# Patient Record
Sex: MALE | Race: WHITE | Employment: OTHER | URBAN - METROPOLITAN AREA
[De-identification: names, ages, dates, MRNs, and addresses within clinical notes are randomized per-mention and may not be internally consistent; named-entity substitution may affect disease eponyms.]

---

## 2017-01-14 PROBLEM — N39.0 UTI (URINARY TRACT INFECTION): Status: ACTIVE | Noted: 2017-01-14

## 2017-01-14 PROBLEM — N40.0 PROSTATISM: Status: ACTIVE | Noted: 2017-01-14

## 2017-01-14 PROBLEM — N32.0 BLADDER NECK OBSTRUCTION: Status: ACTIVE | Noted: 2017-01-14

## 2017-01-14 PROBLEM — C61 PROSTATE CANCER (HCC): Status: ACTIVE | Noted: 2017-01-14

## 2017-01-14 PROBLEM — R31.9 HEMATURIA: Status: ACTIVE | Noted: 2017-01-14

## 2017-01-17 PROBLEM — I95.1 ORTHOSTATIC HYPOTENSION: Status: ACTIVE | Noted: 2017-01-17

## 2017-01-18 ENCOUNTER — TELEPHONE (OUTPATIENT)
Dept: FAMILY MEDICINE CLINIC | Age: 82
End: 2017-01-18

## 2017-01-23 ENCOUNTER — OFFICE VISIT (OUTPATIENT)
Dept: FAMILY MEDICINE CLINIC | Age: 82
End: 2017-01-23

## 2017-01-23 VITALS
DIASTOLIC BLOOD PRESSURE: 60 MMHG | TEMPERATURE: 97.7 F | RESPIRATION RATE: 12 BRPM | HEIGHT: 70 IN | SYSTOLIC BLOOD PRESSURE: 130 MMHG | WEIGHT: 166.4 LBS | BODY MASS INDEX: 23.82 KG/M2 | HEART RATE: 94 BPM

## 2017-01-23 DIAGNOSIS — N32.0 BLADDER OUTFLOW OBSTRUCTION: ICD-10-CM

## 2017-01-23 DIAGNOSIS — E03.2 HYPOTHYROIDISM DUE TO MEDICATION: ICD-10-CM

## 2017-01-23 DIAGNOSIS — H61.21 HEARING LOSS OF RIGHT EAR DUE TO CERUMEN IMPACTION: ICD-10-CM

## 2017-01-23 DIAGNOSIS — N13.30 BILATERAL HYDRONEPHROSIS: ICD-10-CM

## 2017-01-23 DIAGNOSIS — Z23 FLU VACCINE NEED: ICD-10-CM

## 2017-01-23 DIAGNOSIS — Z09 HOSPITAL DISCHARGE FOLLOW-UP: Primary | ICD-10-CM

## 2017-01-23 DIAGNOSIS — I10 ESSENTIAL HYPERTENSION: ICD-10-CM

## 2017-01-23 DIAGNOSIS — K40.90 INGUINAL HERNIA OF RIGHT SIDE WITHOUT OBSTRUCTION OR GANGRENE: ICD-10-CM

## 2017-01-23 PROCEDURE — G8427 DOCREV CUR MEDS BY ELIG CLIN: HCPCS | Performed by: NURSE PRACTITIONER

## 2017-01-23 PROCEDURE — 69209 REMOVE IMPACTED EAR WAX UNI: CPT | Performed by: NURSE PRACTITIONER

## 2017-01-23 PROCEDURE — G8484 FLU IMMUNIZE NO ADMIN: HCPCS | Performed by: NURSE PRACTITIONER

## 2017-01-23 PROCEDURE — 1036F TOBACCO NON-USER: CPT | Performed by: NURSE PRACTITIONER

## 2017-01-23 PROCEDURE — 99214 OFFICE O/P EST MOD 30 MIN: CPT | Performed by: NURSE PRACTITIONER

## 2017-01-23 PROCEDURE — G8420 CALC BMI NORM PARAMETERS: HCPCS | Performed by: NURSE PRACTITIONER

## 2017-01-23 PROCEDURE — 1123F ACP DISCUSS/DSCN MKR DOCD: CPT | Performed by: NURSE PRACTITIONER

## 2017-01-23 PROCEDURE — 4040F PNEUMOC VAC/ADMIN/RCVD: CPT | Performed by: NURSE PRACTITIONER

## 2017-01-23 PROCEDURE — 1111F DSCHRG MED/CURRENT MED MERGE: CPT | Performed by: NURSE PRACTITIONER

## 2017-01-23 ASSESSMENT — ENCOUNTER SYMPTOMS
NAUSEA: 0
VOMITING: 0
DIARRHEA: 0
ABDOMINAL DISTENTION: 0
RESPIRATORY NEGATIVE: 1
SORE THROAT: 0
CONSTIPATION: 0
RHINORRHEA: 0
ABDOMINAL PAIN: 0

## 2017-01-30 ENCOUNTER — PROCEDURE VISIT (OUTPATIENT)
Dept: UROLOGY | Age: 82
End: 2017-01-30

## 2017-01-30 VITALS — BODY MASS INDEX: 23.24 KG/M2 | HEIGHT: 71 IN | WEIGHT: 166 LBS

## 2017-01-30 DIAGNOSIS — R35.0 URINARY FREQUENCY: Primary | ICD-10-CM

## 2017-01-30 LAB — POST VOID RESIDUAL (PVR): 220 ML

## 2017-01-30 PROCEDURE — 99999 PR OFFICE/OUTPT VISIT,PROCEDURE ONLY: CPT | Performed by: UROLOGY

## 2017-01-30 PROCEDURE — 1036F TOBACCO NON-USER: CPT | Performed by: UROLOGY

## 2017-01-30 PROCEDURE — 52000 CYSTOURETHROSCOPY: CPT | Performed by: UROLOGY

## 2017-01-30 PROCEDURE — 51798 US URINE CAPACITY MEASURE: CPT | Performed by: UROLOGY

## 2017-01-31 ENCOUNTER — NURSE ONLY (OUTPATIENT)
Dept: FAMILY MEDICINE CLINIC | Age: 82
End: 2017-01-31

## 2017-01-31 DIAGNOSIS — I10 ESSENTIAL HYPERTENSION: ICD-10-CM

## 2017-01-31 DIAGNOSIS — R35.0 URINARY FREQUENCY: ICD-10-CM

## 2017-01-31 DIAGNOSIS — Z01.89 ROUTINE LAB DRAW: Primary | ICD-10-CM

## 2017-01-31 DIAGNOSIS — E03.2 HYPOTHYROIDISM DUE TO MEDICATION: ICD-10-CM

## 2017-01-31 LAB
CHOLESTEROL, TOTAL: 230 MG/DL (ref 100–199)
HDLC SERPL-MCNC: 58 MG/DL
LDL CHOLESTEROL CALCULATED: 142 MG/DL
PROSTATE SPECIFIC ANTIGEN: 4.73 NG/ML (ref 0–2.5)
T4 FREE: 1.11 NG/DL (ref 0.93–1.76)
TRIGL SERPL-MCNC: 152 MG/DL (ref 0–199)
TSH SERPL DL<=0.05 MIU/L-ACNC: 2.55 MCIU/ML (ref 0.4–4.2)

## 2017-01-31 PROCEDURE — 36415 COLL VENOUS BLD VENIPUNCTURE: CPT | Performed by: NURSE PRACTITIONER

## 2017-02-01 ENCOUNTER — OFFICE VISIT (OUTPATIENT)
Age: 82
End: 2017-02-01

## 2017-02-01 ENCOUNTER — TELEPHONE (OUTPATIENT)
Dept: FAMILY MEDICINE CLINIC | Age: 82
End: 2017-02-01

## 2017-02-01 VITALS
TEMPERATURE: 97.9 F | BODY MASS INDEX: 23.52 KG/M2 | HEART RATE: 92 BPM | HEIGHT: 71 IN | SYSTOLIC BLOOD PRESSURE: 124 MMHG | WEIGHT: 168 LBS | DIASTOLIC BLOOD PRESSURE: 68 MMHG | RESPIRATION RATE: 14 BRPM

## 2017-02-01 DIAGNOSIS — K40.90 RIGHT INGUINAL HERNIA: Primary | ICD-10-CM

## 2017-02-01 DIAGNOSIS — I10 ESSENTIAL HYPERTENSION: ICD-10-CM

## 2017-02-01 PROCEDURE — 1036F TOBACCO NON-USER: CPT | Performed by: SURGERY

## 2017-02-01 PROCEDURE — G8484 FLU IMMUNIZE NO ADMIN: HCPCS | Performed by: SURGERY

## 2017-02-01 PROCEDURE — 99204 OFFICE O/P NEW MOD 45 MIN: CPT | Performed by: SURGERY

## 2017-02-01 PROCEDURE — G8427 DOCREV CUR MEDS BY ELIG CLIN: HCPCS | Performed by: SURGERY

## 2017-02-01 PROCEDURE — G8420 CALC BMI NORM PARAMETERS: HCPCS | Performed by: SURGERY

## 2017-02-01 PROCEDURE — 4040F PNEUMOC VAC/ADMIN/RCVD: CPT | Performed by: SURGERY

## 2017-02-01 ASSESSMENT — ENCOUNTER SYMPTOMS
SHORTNESS OF BREATH: 0
NAUSEA: 0
TROUBLE SWALLOWING: 0
COUGH: 0
SINUS PRESSURE: 0
EYE REDNESS: 0
ABDOMINAL DISTENTION: 0
EYE ITCHING: 0
COLOR CHANGE: 0
BACK PAIN: 0
BLOOD IN STOOL: 0
STRIDOR: 0
ANAL BLEEDING: 0
SORE THROAT: 0
RHINORRHEA: 0
EYE DISCHARGE: 0
EYE PAIN: 0
PHOTOPHOBIA: 0
RECTAL PAIN: 0
ABDOMINAL PAIN: 0
VOMITING: 0
CHOKING: 0
CHEST TIGHTNESS: 0
WHEEZING: 0
CONSTIPATION: 0
DIARRHEA: 0
FACIAL SWELLING: 0
APNEA: 0
VOICE CHANGE: 0

## 2017-02-03 ENCOUNTER — TELEPHONE (OUTPATIENT)
Dept: UROLOGY | Age: 82
End: 2017-02-03

## 2017-02-13 ENCOUNTER — NURSE ONLY (OUTPATIENT)
Dept: UROLOGY | Age: 82
End: 2017-02-13

## 2017-02-13 DIAGNOSIS — R35.0 URINARY FREQUENCY: Primary | ICD-10-CM

## 2017-02-13 LAB
BILIRUBIN, POC: NEGATIVE
BLOOD URINE, POC: NORMAL
CLARITY, POC: CLEAR
COLOR, POC: YELLOW
GLUCOSE URINE, POC: NEGATIVE
KETONES, POC: NEGATIVE
LEUKOCYTE EST, POC: NORMAL
NITRITE, POC: NEGATIVE
PH, POC: 5.5
PROTEIN, POC: NORMAL
SPECIFIC GRAVITY, POC: 1.02
UROBILINOGEN, POC: NORMAL

## 2017-02-13 PROCEDURE — 99213 OFFICE O/P EST LOW 20 MIN: CPT | Performed by: UROLOGY

## 2017-02-13 PROCEDURE — 81003 URINALYSIS AUTO W/O SCOPE: CPT | Performed by: UROLOGY

## 2017-02-20 ENCOUNTER — TELEPHONE (OUTPATIENT)
Dept: UROLOGY | Age: 82
End: 2017-02-20

## 2017-02-20 ENCOUNTER — TELEPHONE (OUTPATIENT)
Dept: FAMILY MEDICINE CLINIC | Age: 82
End: 2017-02-20

## 2017-02-20 ENCOUNTER — TELEPHONE (OUTPATIENT)
Age: 82
End: 2017-02-20

## 2017-02-20 DIAGNOSIS — N32.0 BLADDER OUTFLOW OBSTRUCTION: Primary | ICD-10-CM

## 2017-02-20 DIAGNOSIS — C61 PROSTATE CANCER (HCC): ICD-10-CM

## 2017-02-20 DIAGNOSIS — Z01.818 PRE-OP TESTING: ICD-10-CM

## 2017-03-11 ENCOUNTER — TELEPHONE (OUTPATIENT)
Dept: UROLOGY | Age: 82
End: 2017-03-11

## 2017-03-11 DIAGNOSIS — N30.00 ACUTE CYSTITIS WITHOUT HEMATURIA: Primary | ICD-10-CM

## 2017-03-13 ENCOUNTER — OFFICE VISIT (OUTPATIENT)
Dept: FAMILY MEDICINE CLINIC | Age: 82
End: 2017-03-13

## 2017-03-13 VITALS
WEIGHT: 173.2 LBS | HEIGHT: 71 IN | RESPIRATION RATE: 12 BRPM | BODY MASS INDEX: 24.25 KG/M2 | SYSTOLIC BLOOD PRESSURE: 130 MMHG | TEMPERATURE: 97.7 F | DIASTOLIC BLOOD PRESSURE: 62 MMHG | HEART RATE: 62 BPM

## 2017-03-13 DIAGNOSIS — E03.9 HYPOTHYROIDISM, UNSPECIFIED TYPE: ICD-10-CM

## 2017-03-13 DIAGNOSIS — N40.1 BENIGN PROSTATIC HYPERPLASIA WITH LOWER URINARY TRACT SYMPTOMS, UNSPECIFIED MORPHOLOGY: ICD-10-CM

## 2017-03-13 DIAGNOSIS — N32.0 BLADDER OUTFLOW OBSTRUCTION: ICD-10-CM

## 2017-03-13 DIAGNOSIS — Z01.818 PREOPERATIVE CLEARANCE: Primary | ICD-10-CM

## 2017-03-13 DIAGNOSIS — I10 ESSENTIAL HYPERTENSION: ICD-10-CM

## 2017-03-13 PROCEDURE — G8484 FLU IMMUNIZE NO ADMIN: HCPCS | Performed by: NURSE PRACTITIONER

## 2017-03-13 PROCEDURE — G8427 DOCREV CUR MEDS BY ELIG CLIN: HCPCS | Performed by: NURSE PRACTITIONER

## 2017-03-13 PROCEDURE — 1036F TOBACCO NON-USER: CPT | Performed by: NURSE PRACTITIONER

## 2017-03-13 PROCEDURE — 4040F PNEUMOC VAC/ADMIN/RCVD: CPT | Performed by: NURSE PRACTITIONER

## 2017-03-13 PROCEDURE — 99214 OFFICE O/P EST MOD 30 MIN: CPT | Performed by: NURSE PRACTITIONER

## 2017-03-13 PROCEDURE — G8420 CALC BMI NORM PARAMETERS: HCPCS | Performed by: NURSE PRACTITIONER

## 2017-03-14 ENCOUNTER — NURSE TRIAGE (OUTPATIENT)
Dept: ADMINISTRATIVE | Age: 82
End: 2017-03-14

## 2017-03-14 ENCOUNTER — TELEPHONE (OUTPATIENT)
Dept: UROLOGY | Age: 82
End: 2017-03-14

## 2017-03-14 RX ORDER — DOXYCYCLINE HYCLATE 100 MG
100 TABLET ORAL 2 TIMES DAILY
Qty: 20 TABLET | Refills: 0 | Status: SHIPPED | OUTPATIENT
Start: 2017-03-14 | End: 2017-03-23 | Stop reason: ALTCHOICE

## 2017-03-14 RX ORDER — CEPHALEXIN 250 MG/1
250 CAPSULE ORAL 4 TIMES DAILY
Qty: 40 CAPSULE | Refills: 0 | Status: SHIPPED | OUTPATIENT
Start: 2017-03-14 | End: 2017-03-23 | Stop reason: ALTCHOICE

## 2017-03-14 RX ORDER — TETRACYCLINE HYDROCHLORIDE 250 MG/1
250 CAPSULE ORAL 2 TIMES DAILY
Qty: 20 CAPSULE | Refills: 0 | Status: SHIPPED | OUTPATIENT
Start: 2017-03-14 | End: 2017-03-30

## 2017-03-23 ENCOUNTER — TELEPHONE (OUTPATIENT)
Dept: UROLOGY | Age: 82
End: 2017-03-23

## 2017-03-23 DIAGNOSIS — N39.0 URINARY TRACT INFECTION, SITE UNSPECIFIED: Primary | ICD-10-CM

## 2017-03-26 ENCOUNTER — TELEPHONE (OUTPATIENT)
Dept: UROLOGY | Age: 82
End: 2017-03-26

## 2017-03-26 RX ORDER — NITROFURANTOIN 25; 75 MG/1; MG/1
100 CAPSULE ORAL 2 TIMES DAILY
Qty: 14 CAPSULE | Refills: 0 | Status: SHIPPED | OUTPATIENT
Start: 2017-03-26 | End: 2017-04-02

## 2017-04-06 ENCOUNTER — OFFICE VISIT (OUTPATIENT)
Dept: UROLOGY | Age: 82
End: 2017-04-06

## 2017-04-06 VITALS
WEIGHT: 173 LBS | BODY MASS INDEX: 24.22 KG/M2 | DIASTOLIC BLOOD PRESSURE: 68 MMHG | HEIGHT: 71 IN | SYSTOLIC BLOOD PRESSURE: 130 MMHG

## 2017-04-06 DIAGNOSIS — C61 PROSTATE CANCER (HCC): ICD-10-CM

## 2017-04-06 DIAGNOSIS — R41.0 CONFUSION: Primary | ICD-10-CM

## 2017-04-06 LAB
BILIRUBIN URINE: NEGATIVE
BLOOD URINE, POC: ABNORMAL
CHARACTER, URINE: CLEAR
COLOR, URINE: YELLOW
GLUCOSE URINE: NEGATIVE MG/DL
KETONES, URINE: NEGATIVE
LEUKOCYTE CLUMPS, URINE: ABNORMAL
NITRITE, URINE: POSITIVE
PH, URINE: 5.5
PROTEIN, URINE: >= 300 MG/DL
SPECIFIC GRAVITY, URINE: 1.02 (ref 1–1.03)
UROBILINOGEN, URINE: 0.2 EU/DL

## 2017-04-06 PROCEDURE — 81003 URINALYSIS AUTO W/O SCOPE: CPT | Performed by: NURSE PRACTITIONER

## 2017-04-06 PROCEDURE — 99024 POSTOP FOLLOW-UP VISIT: CPT | Performed by: NURSE PRACTITIONER

## 2017-04-06 RX ORDER — DOXYCYCLINE HYCLATE 100 MG
100 TABLET ORAL 2 TIMES DAILY
Qty: 14 TABLET | Refills: 0 | Status: SHIPPED | OUTPATIENT
Start: 2017-04-06 | End: 2017-04-09

## 2017-04-06 RX ORDER — NITROFURANTOIN 25; 75 MG/1; MG/1
CAPSULE ORAL
COMMUNITY
Start: 2017-03-26 | End: 2017-04-12 | Stop reason: ALTCHOICE

## 2017-04-06 ASSESSMENT — ENCOUNTER SYMPTOMS
ABDOMINAL PAIN: 0
NAUSEA: 0
VOMITING: 0

## 2017-04-07 LAB
ORGANISM: ABNORMAL
URINE CULTURE, ROUTINE: ABNORMAL

## 2017-04-10 ENCOUNTER — TELEPHONE (OUTPATIENT)
Dept: UROLOGY | Age: 82
End: 2017-04-10

## 2017-04-12 ENCOUNTER — NURSE ONLY (OUTPATIENT)
Dept: UROLOGY | Age: 82
End: 2017-04-12

## 2017-04-12 ENCOUNTER — TELEPHONE (OUTPATIENT)
Dept: UROLOGY | Age: 82
End: 2017-04-12

## 2017-04-12 ENCOUNTER — OFFICE VISIT (OUTPATIENT)
Age: 82
End: 2017-04-12

## 2017-04-12 VITALS
TEMPERATURE: 97.8 F | SYSTOLIC BLOOD PRESSURE: 124 MMHG | BODY MASS INDEX: 23.46 KG/M2 | RESPIRATION RATE: 16 BRPM | WEIGHT: 173 LBS | HEART RATE: 80 BPM | DIASTOLIC BLOOD PRESSURE: 64 MMHG

## 2017-04-12 DIAGNOSIS — N13.8 BPH WITH OBSTRUCTION/LOWER URINARY TRACT SYMPTOMS: Primary | ICD-10-CM

## 2017-04-12 DIAGNOSIS — N40.1 BPH WITH OBSTRUCTION/LOWER URINARY TRACT SYMPTOMS: Primary | ICD-10-CM

## 2017-04-12 DIAGNOSIS — Z87.19 S/P RIGHT INGUINAL HERNIA REPAIR: Primary | ICD-10-CM

## 2017-04-12 DIAGNOSIS — Z98.890 S/P RIGHT INGUINAL HERNIA REPAIR: Primary | ICD-10-CM

## 2017-04-12 PROCEDURE — 99024 POSTOP FOLLOW-UP VISIT: CPT | Performed by: NURSE PRACTITIONER

## 2017-04-12 RX ORDER — NITROFURANTOIN 25; 75 MG/1; MG/1
100 CAPSULE ORAL 2 TIMES DAILY
Qty: 10 CAPSULE | Refills: 0 | Status: SHIPPED | OUTPATIENT
Start: 2017-04-12 | End: 2017-04-17

## 2017-04-12 ASSESSMENT — ENCOUNTER SYMPTOMS
VOICE CHANGE: 0
SORE THROAT: 0
ANAL BLEEDING: 0
VOMITING: 0
EYE ITCHING: 0
NAUSEA: 0
SINUS PRESSURE: 0
SHORTNESS OF BREATH: 0
EYE DISCHARGE: 0
ABDOMINAL DISTENTION: 0
STRIDOR: 0
TROUBLE SWALLOWING: 0
COUGH: 0
BACK PAIN: 0
CHEST TIGHTNESS: 0
FACIAL SWELLING: 0
WHEEZING: 0
DIARRHEA: 0
PHOTOPHOBIA: 0
RHINORRHEA: 0
BLOOD IN STOOL: 0
CONSTIPATION: 0
EYE PAIN: 0
EYE REDNESS: 0
CHOKING: 0
APNEA: 0
RECTAL PAIN: 0
ABDOMINAL PAIN: 0
COLOR CHANGE: 0

## 2017-04-13 ENCOUNTER — NURSE ONLY (OUTPATIENT)
Dept: UROLOGY | Age: 82
End: 2017-04-13

## 2017-04-13 VITALS — WEIGHT: 173 LBS | HEIGHT: 72 IN | BODY MASS INDEX: 23.43 KG/M2

## 2017-04-13 DIAGNOSIS — R33.9 URINARY RETENTION: Primary | ICD-10-CM

## 2017-04-13 LAB
BILIRUBIN URINE: NEGATIVE
BLOOD URINE, POC: ABNORMAL
CHARACTER, URINE: CLEAR
COLOR, URINE: YELLOW
GLUCOSE URINE: NEGATIVE MG/DL
KETONES, URINE: NEGATIVE
LEUKOCYTE CLUMPS, URINE: ABNORMAL
NITRITE, URINE: NEGATIVE
PH, URINE: 6
POST VOID RESIDUAL (PVR): 39 ML
PROTEIN, URINE: 100 MG/DL
SPECIFIC GRAVITY, URINE: 1.02 (ref 1–1.03)
UROBILINOGEN, URINE: 0.2 EU/DL

## 2017-04-13 PROCEDURE — 51701 INSERT BLADDER CATHETER: CPT | Performed by: NURSE PRACTITIONER

## 2017-04-13 PROCEDURE — 51798 US URINE CAPACITY MEASURE: CPT | Performed by: NURSE PRACTITIONER

## 2017-04-13 PROCEDURE — 81003 URINALYSIS AUTO W/O SCOPE: CPT | Performed by: NURSE PRACTITIONER

## 2017-04-13 PROCEDURE — 99024 POSTOP FOLLOW-UP VISIT: CPT | Performed by: NURSE PRACTITIONER

## 2017-04-14 ASSESSMENT — ENCOUNTER SYMPTOMS
ALLERGIC/IMMUNOLOGIC NEGATIVE: 1
ABDOMINAL DISTENTION: 0
ROS SKIN COMMENTS: INCISION HEALING
NAUSEA: 0
COLOR CHANGE: 0
COUGH: 0
CHEST TIGHTNESS: 0
DIARRHEA: 0
RESPIRATORY NEGATIVE: 1
CONSTIPATION: 0
VOMITING: 0
ABDOMINAL PAIN: 0
SHORTNESS OF BREATH: 0
BACK PAIN: 0
EYES NEGATIVE: 1

## 2017-04-17 ENCOUNTER — TELEPHONE (OUTPATIENT)
Dept: UROLOGY | Age: 82
End: 2017-04-17

## 2017-04-19 ENCOUNTER — TELEPHONE (OUTPATIENT)
Dept: UROLOGY | Age: 82
End: 2017-04-19

## 2017-04-19 ENCOUNTER — OFFICE VISIT (OUTPATIENT)
Dept: UROLOGY | Age: 82
End: 2017-04-19

## 2017-04-19 VITALS
WEIGHT: 177 LBS | HEIGHT: 71 IN | SYSTOLIC BLOOD PRESSURE: 114 MMHG | DIASTOLIC BLOOD PRESSURE: 48 MMHG | BODY MASS INDEX: 24.78 KG/M2

## 2017-04-19 DIAGNOSIS — C61 PROSTATE CANCER (HCC): Primary | ICD-10-CM

## 2017-04-19 DIAGNOSIS — R33.9 URINARY RETENTION: Primary | ICD-10-CM

## 2017-04-19 DIAGNOSIS — C61 PROSTATE CANCER (HCC): ICD-10-CM

## 2017-04-19 LAB — POST VOID RESIDUAL (PVR): 40 ML

## 2017-04-19 PROCEDURE — 99024 POSTOP FOLLOW-UP VISIT: CPT | Performed by: NURSE PRACTITIONER

## 2017-04-19 PROCEDURE — 1123F ACP DISCUSS/DSCN MKR DOCD: CPT | Performed by: NURSE PRACTITIONER

## 2017-04-19 PROCEDURE — 4040F PNEUMOC VAC/ADMIN/RCVD: CPT | Performed by: NURSE PRACTITIONER

## 2017-04-19 PROCEDURE — 1036F TOBACCO NON-USER: CPT | Performed by: NURSE PRACTITIONER

## 2017-04-19 PROCEDURE — 1111F DSCHRG MED/CURRENT MED MERGE: CPT | Performed by: NURSE PRACTITIONER

## 2017-04-19 PROCEDURE — G8420 CALC BMI NORM PARAMETERS: HCPCS | Performed by: NURSE PRACTITIONER

## 2017-04-19 PROCEDURE — G8427 DOCREV CUR MEDS BY ELIG CLIN: HCPCS | Performed by: NURSE PRACTITIONER

## 2017-04-19 PROCEDURE — 51798 US URINE CAPACITY MEASURE: CPT | Performed by: NURSE PRACTITIONER

## 2017-04-19 RX ORDER — TAMSULOSIN HYDROCHLORIDE 0.4 MG/1
0.4 CAPSULE ORAL DAILY
Qty: 90 CAPSULE | Refills: 1 | Status: SHIPPED | OUTPATIENT
Start: 2017-04-19 | End: 2017-11-29

## 2017-04-19 ASSESSMENT — ENCOUNTER SYMPTOMS
ABDOMINAL PAIN: 0
NAUSEA: 0
VOMITING: 0

## 2017-06-02 ENCOUNTER — TELEPHONE (OUTPATIENT)
Dept: UROLOGY | Age: 82
End: 2017-06-02

## 2017-06-02 DIAGNOSIS — N39.0 URINARY TRACT INFECTION, SITE UNSPECIFIED: Primary | ICD-10-CM

## 2017-06-05 ENCOUNTER — TELEPHONE (OUTPATIENT)
Dept: UROLOGY | Age: 82
End: 2017-06-05

## 2017-06-05 RX ORDER — CEFDINIR 300 MG/1
300 CAPSULE ORAL 2 TIMES DAILY
Qty: 20 CAPSULE | Refills: 0 | Status: SHIPPED | OUTPATIENT
Start: 2017-06-05 | End: 2017-06-15

## 2017-06-06 ENCOUNTER — TELEPHONE (OUTPATIENT)
Dept: FAMILY MEDICINE CLINIC | Age: 82
End: 2017-06-06

## 2017-07-17 ENCOUNTER — TELEPHONE (OUTPATIENT)
Dept: UROLOGY | Age: 82
End: 2017-07-17

## 2017-07-17 ENCOUNTER — HOSPITAL ENCOUNTER (OUTPATIENT)
Age: 82
Discharge: HOME | End: 2017-07-17
Payer: MEDICARE

## 2017-07-17 DIAGNOSIS — C61 PROSTATE CANCER (HCC): ICD-10-CM

## 2017-07-17 LAB — PROSTATE SPECIFIC ANTIGEN: 0.99 NG/ML (ref 0–1)

## 2017-07-17 PROCEDURE — 84153 ASSAY OF PSA TOTAL: CPT

## 2017-07-17 PROCEDURE — 36415 COLL VENOUS BLD VENIPUNCTURE: CPT

## 2017-07-18 ENCOUNTER — OFFICE VISIT (OUTPATIENT)
Dept: UROLOGY | Age: 82
End: 2017-07-18
Payer: MEDICARE

## 2017-07-18 VITALS
SYSTOLIC BLOOD PRESSURE: 134 MMHG | HEIGHT: 71 IN | DIASTOLIC BLOOD PRESSURE: 78 MMHG | BODY MASS INDEX: 24.78 KG/M2 | WEIGHT: 177 LBS

## 2017-07-18 DIAGNOSIS — C61 PROSTATE CANCER (HCC): ICD-10-CM

## 2017-07-18 DIAGNOSIS — R35.0 URINARY FREQUENCY: ICD-10-CM

## 2017-07-18 DIAGNOSIS — N32.0 BLADDER OUTFLOW OBSTRUCTION: Primary | ICD-10-CM

## 2017-07-18 LAB
BACTERIA: ABNORMAL /HPF
BILIRUBIN URINE: ABNORMAL
BLOOD, URINE: ABNORMAL
CASTS 2: ABNORMAL /LPF
CASTS UA: ABNORMAL /LPF
CHARACTER, URINE: ABNORMAL
COLOR: ABNORMAL
CRYSTALS, UA: ABNORMAL
EPITHELIAL CELLS, UA: ABNORMAL /HPF
GLUCOSE URINE: NEGATIVE MG/DL
ICTOTEST: NEGATIVE
KETONES, URINE: ABNORMAL
LEUKOCYTE ESTERASE, URINE: ABNORMAL
MISCELLANEOUS 2: ABNORMAL
NITRITE, URINE: NEGATIVE
PH UA: 6.5
POST VOID RESIDUAL (PVR): 54 ML
PROTEIN UA: 300
RBC URINE: ABNORMAL /HPF
RENAL EPITHELIAL, UA: ABNORMAL
SPECIFIC GRAVITY, URINE: 1.02 (ref 1–1.03)
UROBILINOGEN, URINE: 0.2 EU/DL
WBC UA: > 200 /HPF
YEAST: ABNORMAL

## 2017-07-18 PROCEDURE — 1123F ACP DISCUSS/DSCN MKR DOCD: CPT | Performed by: NURSE PRACTITIONER

## 2017-07-18 PROCEDURE — 51798 US URINE CAPACITY MEASURE: CPT | Performed by: NURSE PRACTITIONER

## 2017-07-18 PROCEDURE — 1036F TOBACCO NON-USER: CPT | Performed by: NURSE PRACTITIONER

## 2017-07-18 PROCEDURE — G8420 CALC BMI NORM PARAMETERS: HCPCS | Performed by: NURSE PRACTITIONER

## 2017-07-18 PROCEDURE — G8428 CUR MEDS NOT DOCUMENT: HCPCS | Performed by: NURSE PRACTITIONER

## 2017-07-18 PROCEDURE — 4040F PNEUMOC VAC/ADMIN/RCVD: CPT | Performed by: NURSE PRACTITIONER

## 2017-07-18 PROCEDURE — 99213 OFFICE O/P EST LOW 20 MIN: CPT | Performed by: NURSE PRACTITIONER

## 2017-07-18 ASSESSMENT — ENCOUNTER SYMPTOMS
VOMITING: 0
NAUSEA: 0
ABDOMINAL PAIN: 0

## 2017-07-20 ENCOUNTER — TELEPHONE (OUTPATIENT)
Dept: UROLOGY | Age: 82
End: 2017-07-20

## 2017-07-20 LAB
ORGANISM: ABNORMAL
URINE CULTURE REFLEX: ABNORMAL

## 2017-07-20 RX ORDER — CEPHALEXIN 500 MG/1
500 CAPSULE ORAL 4 TIMES DAILY
Qty: 12 CAPSULE | Refills: 0 | Status: SHIPPED | OUTPATIENT
Start: 2017-07-20 | End: 2017-07-23

## 2017-11-13 ENCOUNTER — HOSPITAL ENCOUNTER (OUTPATIENT)
Age: 82
Discharge: HOME | End: 2017-11-13
Payer: MEDICARE

## 2017-11-13 DIAGNOSIS — C61 PROSTATE CANCER (HCC): ICD-10-CM

## 2017-11-13 LAB — PROSTATE SPECIFIC ANTIGEN: 1.14 NG/ML (ref 0–1)

## 2017-11-13 PROCEDURE — 36415 COLL VENOUS BLD VENIPUNCTURE: CPT

## 2017-11-13 PROCEDURE — 84153 ASSAY OF PSA TOTAL: CPT

## 2017-11-14 ENCOUNTER — TELEPHONE (OUTPATIENT)
Dept: UROLOGY | Age: 82
End: 2017-11-14

## 2017-11-14 DIAGNOSIS — C61 PROSTATE CANCER (HCC): Primary | ICD-10-CM

## 2017-11-15 NOTE — TELEPHONE ENCOUNTER
Pt's son stated since having the TURP he has been slightly incontinent, Pt had the TURP 3-2017 and the son feels his father should not still have the incontinence. He is changing his pad 2-3 times a day. Is there something we can do for him? Son wants to bring him in I advised I would send a message to the provider first before we bring him in.

## 2017-11-29 ENCOUNTER — OFFICE VISIT (OUTPATIENT)
Dept: UROLOGY | Age: 82
End: 2017-11-29
Payer: MEDICARE

## 2017-11-29 VITALS
BODY MASS INDEX: 25.06 KG/M2 | HEIGHT: 72 IN | DIASTOLIC BLOOD PRESSURE: 72 MMHG | WEIGHT: 185 LBS | SYSTOLIC BLOOD PRESSURE: 128 MMHG

## 2017-11-29 DIAGNOSIS — R32 URINARY INCONTINENCE, UNSPECIFIED TYPE: ICD-10-CM

## 2017-11-29 DIAGNOSIS — R31.9 URINARY TRACT INFECTION WITH HEMATURIA, SITE UNSPECIFIED: Primary | ICD-10-CM

## 2017-11-29 DIAGNOSIS — N39.0 URINARY TRACT INFECTION WITH HEMATURIA, SITE UNSPECIFIED: Primary | ICD-10-CM

## 2017-11-29 DIAGNOSIS — C61 PROSTATE CANCER (HCC): ICD-10-CM

## 2017-11-29 DIAGNOSIS — Z87.898 HISTORY OF URINARY RETENTION: ICD-10-CM

## 2017-11-29 LAB
BILIRUBIN URINE: NEGATIVE
BLOOD URINE, POC: ABNORMAL
CHARACTER, URINE: CLEAR
COLOR, URINE: YELLOW
GLUCOSE URINE: NEGATIVE MG/DL
KETONES, URINE: ABNORMAL
LEUKOCYTE CLUMPS, URINE: ABNORMAL
NITRITE, URINE: POSITIVE
PH, URINE: 6
POST VOID RESIDUAL (PVR): 0 ML
PROTEIN, URINE: 100 MG/DL
SPECIFIC GRAVITY, URINE: 1.02 (ref 1–1.03)
UROBILINOGEN, URINE: 0.2 EU/DL

## 2017-11-29 PROCEDURE — 1123F ACP DISCUSS/DSCN MKR DOCD: CPT | Performed by: NURSE PRACTITIONER

## 2017-11-29 PROCEDURE — 51798 US URINE CAPACITY MEASURE: CPT | Performed by: NURSE PRACTITIONER

## 2017-11-29 PROCEDURE — G8428 CUR MEDS NOT DOCUMENT: HCPCS | Performed by: NURSE PRACTITIONER

## 2017-11-29 PROCEDURE — 81003 URINALYSIS AUTO W/O SCOPE: CPT | Performed by: NURSE PRACTITIONER

## 2017-11-29 PROCEDURE — 1036F TOBACCO NON-USER: CPT | Performed by: NURSE PRACTITIONER

## 2017-11-29 PROCEDURE — 99214 OFFICE O/P EST MOD 30 MIN: CPT | Performed by: NURSE PRACTITIONER

## 2017-11-29 PROCEDURE — G8484 FLU IMMUNIZE NO ADMIN: HCPCS | Performed by: NURSE PRACTITIONER

## 2017-11-29 PROCEDURE — G8419 CALC BMI OUT NRM PARAM NOF/U: HCPCS | Performed by: NURSE PRACTITIONER

## 2017-11-29 PROCEDURE — 4040F PNEUMOC VAC/ADMIN/RCVD: CPT | Performed by: NURSE PRACTITIONER

## 2017-11-29 RX ORDER — DOXYCYCLINE HYCLATE 100 MG
100 TABLET ORAL 2 TIMES DAILY
Qty: 28 TABLET | Refills: 0 | Status: SHIPPED | OUTPATIENT
Start: 2017-11-29 | End: 2017-12-13

## 2017-11-29 NOTE — PROGRESS NOTES
[facility] PROFESSIONAL SERVS  [facility]  [address]  [address]  [place] [address]  Dept: [phone]  Loc: [phone]  Visit Date: 11/29/2017      HPI:     Nisa Caceres follows up today for prostate cancer, urinary retention and urinary incontinence. He underwent TURP using bipolar loop per Dr. Tami Kumar following right inguinal hernia repair per Dr. Nuris Reese. Pathology showed [place] 5+4=9 prostate cancer with tumor volume of 62%. He has a history of prostate cancer and was treated with external beam radiation therapy in 2000. PSA on 1/15/17 was 2.49 and then on 1/31/17 it was 4.73. A bone scan was negative for metastasis. Patient has chosen active surveillance with intermittent androgen deprivation therapy for treatment. Most recent PSA on 11/13/17 was 1.14  He currently complains of urinary incontinence since his TURP and has become quite bothersome for him. He is interested in options regarding his incontinence. Leaking can occur at anytime. He has not been straight cathing, says he was not getting anything out and has not been doing it for several weeks. PVR today is 0. Urinalysis is concerning for infection today. He comes in with his son. History is obtained from the patient and the medical record. Current Outpatient Prescriptions   Medication Sig Dispense Refill    vitamin D (CHOLECALCIFEROL) 1000 UNIT TABS tablet Take 1,000 Units by mouth daily      doxycycline hyclate (VIBRA-TABS) 100 MG tablet Take 1 tablet by mouth 2 times daily for 14 days 28 tablet 0    levothyroxine (SYNTHROID) 100 MCG tablet Take 100 mcg by mouth Daily      amLODIPine (NORVASC) 10 MG tablet Take 10 mg by mouth daily. No current facility-administered medications for this visit. Past Medical History  Corbin Peabody  has a past medical history of Cancer ([address] Hyperlipidemia; Hypertension; Kidney stone; and Thyroid disease.     Past Surgical History  The patient  has a past surgical history that includes ostate biopsy; other surgical history (Right, 2007); hernia repair; Inguinal hernia repair (Right, 03/30/2017); and TURP (03/30/2017). Family History  This patient's family history includes Cancer (age of onset: 80) in his mother; High Blood Pressure in his father and mother; Stroke in his mother. Social History  Mireya Guy  reports that he has never smoked. He has never used smokeless tobacco. He reports that he drinks alcohol. He reports that he does not use drugs. Subjective:     Review of Systems  No problems with ears, nose or throat. No problems with eyes. No chest pain, shortness of breath, abdominal pain, extremity pain or weakness, and no neurological deficits. No rashes.  symptoms per HPI. The remainder of the review of symptoms is negative. Objective:     PE:   Vitals:    11/29/17 1434   BP: 128/72   Weight: 185 lb (83.9 kg)   Height: 6' (1.829 m)       Constitutional: Alert and oriented times 3, no acute distress and cooperative to examination with appropriate mood and affect. HENT:   Head:        Normocephalic and atraumatic. Mouth/Throat:         Mucous membranes are normal.   Eyes:         EOM are normal. No scleral icterus. PERRLA. Neck:        Supple, symmetrical, trachea midline  Pulmonary/Chest:      Chest symmetric with normal A/P diameter. Normal respiratory rate and rhthym. No use of accessory muscles. Abdominal:         Soft. No tenderness. Bowel sounds present. Musculoskeletal:         Normal range of motion. No edema or tenderness of lower extremities. Extremities: No cyanosis, clubbing, or edema present. Neurological:        Alert and oriented. No cranial nerve deficit. Farzana Pretty Psychiatric:        Normal mood and affect.       Labs   Urine dip demonstrates   Results for POC orders placed in visit on 11/29/17   POCT Urinalysis No Micro (Auto)   Result Value Ref Range    Glucose, Ur Negative NEGATIVE mg/dl    Bilirubin Urine Negative     Ketones, Urine Trace NEGATIVE    Specific Gravity, Urine 1.020 1.002 - 1.03    Blood, UA POC Moderate NEGATIVE    pH, Urine 6.00 5.0 - 9.0    Protein, Urine 100 (A) NEGATIVE mg/dl    Urobilinogen, Urine 0.20 0.0 - 1.0 eu/dl    Nitrite, Urine Positive NEGATIVE    Leukocyte Clumps, Urine Small NEGATIVE    Color, Urine Yellow YELLOW-STR    Character, Urine Clear [id number]   poct post void residual   Result Value Ref Range    post void residual 0 ml       Patients recent PSA values are as follows  Lab Results   Component Value Date    PSA 1.14 (H) 11/13/2017    PSA 0.99 07/17/2017    PSA 0.94 04/19/2017        Recent BUN/Creatinine:  Lab Results   Component Value Date    BUN 22 04/09/2017    CREATININE 1.0 04/09/2017       Radiology  No recent imaging       Assessment & Plan:     Prostate Cancer  Urinary incontinence  Hx Urinary Retention    Alexandru Able follows up today for prostate cancer, urinary incontinence and hx urinary retention. Main complaint today is his incontinence which he has been dealing with since TURP, worsening recently. Urinalysis is concerning for infection. Will send for culture and start antibiotics. Discussed current infection can be worsening his incontinence. He is completely empty, he does have a hx of retention, I offered pelvic floor therapy or possibly anti-cholinergic for his incontinence which he declined at this time. PSA is stable, currently 1.14    Return for As previously scheduled.     Rey Hewitt [facility]

## 2017-12-02 LAB
ORGANISM: ABNORMAL
URINE CULTURE, ROUTINE: ABNORMAL

## 2017-12-04 ENCOUNTER — TELEPHONE (OUTPATIENT)
Dept: UROLOGY | Age: 82
End: 2017-12-04

## 2017-12-04 NOTE — TELEPHONE ENCOUNTER
Called Mitch and left him know he did have UTI, but the antibiotic he is taking should clear it up.   He voiced understanding

## 2018-05-01 ENCOUNTER — TELEPHONE (OUTPATIENT)
Dept: UROLOGY | Age: 83
End: 2018-05-01

## 2018-05-01 ENCOUNTER — HOSPITAL ENCOUNTER (OUTPATIENT)
Age: 83
Discharge: HOME | End: 2018-05-01
Payer: MEDICARE

## 2018-05-01 DIAGNOSIS — C61 PROSTATE CANCER (HCC): ICD-10-CM

## 2018-05-01 DIAGNOSIS — R31.9 URINARY TRACT INFECTION WITH HEMATURIA, SITE UNSPECIFIED: ICD-10-CM

## 2018-05-01 DIAGNOSIS — N39.0 URINARY TRACT INFECTION WITH HEMATURIA, SITE UNSPECIFIED: ICD-10-CM

## 2018-05-01 LAB — PROSTATE SPECIFIC ANTIGEN: 1.05 NG/ML (ref 0–1)

## 2018-05-01 PROCEDURE — 87184 SC STD DISK METHOD PER PLATE: CPT

## 2018-05-01 PROCEDURE — 87077 CULTURE AEROBIC IDENTIFY: CPT

## 2018-05-01 PROCEDURE — 36415 COLL VENOUS BLD VENIPUNCTURE: CPT

## 2018-05-01 PROCEDURE — 87186 SC STD MICRODIL/AGAR DIL: CPT

## 2018-05-01 PROCEDURE — 84153 ASSAY OF PSA TOTAL: CPT

## 2018-05-01 PROCEDURE — 87086 URINE CULTURE/COLONY COUNT: CPT

## 2018-05-03 ENCOUNTER — TELEPHONE (OUTPATIENT)
Dept: UROLOGY | Age: 83
End: 2018-05-03

## 2018-05-03 RX ORDER — NITROFURANTOIN 25; 75 MG/1; MG/1
100 CAPSULE ORAL 2 TIMES DAILY
Qty: 14 CAPSULE | Refills: 0 | Status: SHIPPED | OUTPATIENT
Start: 2018-05-03 | End: 2018-05-04 | Stop reason: ALTCHOICE

## 2018-05-04 ENCOUNTER — TELEPHONE (OUTPATIENT)
Dept: UROLOGY | Age: 83
End: 2018-05-04

## 2018-05-04 LAB
ORGANISM: ABNORMAL
URINE CULTURE, ROUTINE: ABNORMAL

## 2018-05-04 RX ORDER — DOXYCYCLINE HYCLATE 100 MG
100 TABLET ORAL 2 TIMES DAILY
Qty: 20 TABLET | Refills: 0 | Status: SHIPPED | OUTPATIENT
Start: 2018-05-04 | End: 2018-05-14

## 2018-09-27 PROBLEM — N39.0 URINARY TRACT INFECTION: Status: RESOLVED | Noted: 2017-01-14 | Resolved: 2018-09-27

## 2018-11-28 ENCOUNTER — TELEPHONE (OUTPATIENT)
Dept: UROLOGY | Age: 83
End: 2018-11-28

## 2018-11-28 DIAGNOSIS — C61 PROSTATE CANCER (HCC): Primary | ICD-10-CM

## 2018-12-01 ENCOUNTER — HOSPITAL ENCOUNTER (EMERGENCY)
Age: 83
Discharge: HOME | End: 2018-12-01
Payer: MEDICARE

## 2018-12-01 ENCOUNTER — HOSPITAL ENCOUNTER (OUTPATIENT)
Age: 83
Discharge: HOME | End: 2018-12-01
Payer: MEDICARE

## 2018-12-01 VITALS
DIASTOLIC BLOOD PRESSURE: 72 MMHG | WEIGHT: 79 LBS | SYSTOLIC BLOOD PRESSURE: 140 MMHG | OXYGEN SATURATION: 98 % | TEMPERATURE: 97.8 F | BODY MASS INDEX: 10.7 KG/M2 | RESPIRATION RATE: 16 BRPM | HEART RATE: 107 BPM | HEIGHT: 72 IN

## 2018-12-01 DIAGNOSIS — R31.0 GROSS HEMATURIA: ICD-10-CM

## 2018-12-01 DIAGNOSIS — N40.0 ENLARGED PROSTATE: Primary | ICD-10-CM

## 2018-12-01 DIAGNOSIS — C61 PROSTATE CANCER (HCC): ICD-10-CM

## 2018-12-01 PROCEDURE — 36415 COLL VENOUS BLD VENIPUNCTURE: CPT

## 2018-12-01 PROCEDURE — 81003 URINALYSIS AUTO W/O SCOPE: CPT

## 2018-12-01 PROCEDURE — 99213 OFFICE O/P EST LOW 20 MIN: CPT

## 2018-12-01 PROCEDURE — 99213 OFFICE O/P EST LOW 20 MIN: CPT | Performed by: NURSE PRACTITIONER

## 2018-12-01 PROCEDURE — 84153 ASSAY OF PSA TOTAL: CPT

## 2018-12-01 RX ORDER — CIPROFLOXACIN 500 MG/1
500 TABLET, FILM COATED ORAL 2 TIMES DAILY
Qty: 20 TABLET | Refills: 0 | Status: SHIPPED | OUTPATIENT
Start: 2018-12-01 | End: 2018-12-11

## 2018-12-01 ASSESSMENT — PAIN DESCRIPTION - LOCATION: LOCATION: PERINEUM

## 2018-12-01 ASSESSMENT — PAIN SCALES - GENERAL: PAINLEVEL_OUTOF10: 5

## 2018-12-01 NOTE — ED PROVIDER NOTES
Med      levothyroxine (SYNTHROID) 100 MCG tablet Take 100 mcg by mouth Daily      amLODIPine (NORVASC) 10 MG tablet Take 10 mg by mouth daily. ALLERGIES     Patient is is allergic to penicillins. Patients There is no immunization history for the selected administration types on file for this patient. FAMILY HISTORY     Patient's family history includes Cancer (age of onset: 80) in his mother; High Blood Pressure in his father and mother; Stroke in his mother. SOCIAL HISTORY     Patient  reports that he has never smoked. He has never used smokeless tobacco. He reports that he drinks alcohol. He reports that he does not use drugs. PHYSICAL EXAM     ED TRIAGE VITALS  BP: (!) 140/72, Temp: 97.8 °F (36.6 °C), Pulse: 107, Resp: 16, SpO2: 98 %,Estimated body mass index is 10.71 kg/m² as calculated from the following:    Height as of this encounter: 6' (1.829 m). Weight as of this encounter: 79 lb (35.8 kg). ,No LMP for male patient. Physical Exam   Constitutional: He is oriented to person, place, and time. He appears well-developed and well-nourished. No distress. Neck: Normal range of motion. Neck supple. Musculoskeletal: Normal range of motion. Neurological: He is alert and oriented to person, place, and time. Skin: Skin is warm. Capillary refill takes less than 2 seconds. He is not diaphoretic. Psychiatric: He has a normal mood and affect. His behavior is normal. Judgment and thought content normal.   Nursing note and vitals reviewed. DIAGNOSTIC RESULTS     Labs:No results found for this visit on 12/01/18. IMAGING:    No orders to display     URGENT CARE COURSE:     Vitals:    12/01/18 1537   BP: (!) 140/72   Pulse: 107   Resp: 16   Temp: 97.8 °F (36.6 °C)   TempSrc: Temporal   SpO2: 98%   Weight: 79 lb (35.8 kg)   Height: 6' (1.829 m)       Medications - No data to display         PROCEDURES:  None    FINAL IMPRESSION      1. Enlarged prostate    2. Gross hematuria    3.

## 2018-12-02 LAB — PROSTATE SPECIFIC ANTIGEN: 1.16 NG/ML (ref 0–1)

## 2018-12-10 ENCOUNTER — TELEPHONE (OUTPATIENT)
Dept: UROLOGY | Age: 83
End: 2018-12-10

## 2018-12-10 ENCOUNTER — PROCEDURE VISIT (OUTPATIENT)
Dept: UROLOGY | Age: 83
End: 2018-12-10
Payer: MEDICARE

## 2018-12-10 VITALS
HEIGHT: 72 IN | DIASTOLIC BLOOD PRESSURE: 84 MMHG | SYSTOLIC BLOOD PRESSURE: 146 MMHG | BODY MASS INDEX: 24.92 KG/M2 | WEIGHT: 184 LBS

## 2018-12-10 DIAGNOSIS — C61 PROSTATE CANCER (HCC): ICD-10-CM

## 2018-12-10 DIAGNOSIS — Z01.818 PRE-OP TESTING: Primary | ICD-10-CM

## 2018-12-10 LAB
BILIRUBIN URINE: NEGATIVE
BLOOD URINE, POC: ABNORMAL
CHARACTER, URINE: CLEAR
COLOR, URINE: YELLOW
GLUCOSE URINE: NEGATIVE MG/DL
KETONES, URINE: NEGATIVE
LEUKOCYTE CLUMPS, URINE: NEGATIVE
NITRITE, URINE: NEGATIVE
PH, URINE: 6
PROTEIN, URINE: >= 300 MG/DL
SPECIFIC GRAVITY, URINE: >= 1.03 (ref 1–1.03)
UROBILINOGEN, URINE: 0.2 EU/DL

## 2018-12-10 PROCEDURE — 99999 PR OFFICE/OUTPT VISIT,PROCEDURE ONLY: CPT | Performed by: UROLOGY

## 2018-12-10 PROCEDURE — 52000 CYSTOURETHROSCOPY: CPT | Performed by: UROLOGY

## 2018-12-10 PROCEDURE — 81003 URINALYSIS AUTO W/O SCOPE: CPT | Performed by: UROLOGY

## 2018-12-10 NOTE — TELEPHONE ENCOUNTER
Patient here to schedule surgery. Patient will need medical clearance with DANY Blue. Appointment is scheduled for 1/4/19 at 9:00 am. Pre op testing given to be done prior to appointment. Surgery consent signed. Surgery date pending clearance.

## 2018-12-12 LAB
REASON FOR REJECTION: NORMAL
REJECTED TEST: NORMAL

## 2018-12-13 ENCOUNTER — HOSPITAL ENCOUNTER (OUTPATIENT)
Age: 83
Discharge: HOME | End: 2018-12-13
Payer: MEDICARE

## 2018-12-13 ENCOUNTER — HOSPITAL ENCOUNTER (OUTPATIENT)
Dept: GENERAL RADIOLOGY | Age: 83
Discharge: HOME | End: 2018-12-13
Payer: MEDICARE

## 2018-12-13 DIAGNOSIS — Z01.818 PRE-OP TESTING: ICD-10-CM

## 2018-12-13 DIAGNOSIS — C61 PROSTATE CANCER (HCC): ICD-10-CM

## 2018-12-13 LAB
ANION GAP SERPL CALCULATED.3IONS-SCNC: 16 MEQ/L (ref 8–16)
BASOPHILS # BLD: 0.5 %
BASOPHILS ABSOLUTE: 0 THOU/MM3 (ref 0–0.1)
BUN BLDV-MCNC: 16 MG/DL (ref 7–22)
CALCIUM SERPL-MCNC: 9.7 MG/DL (ref 8.5–10.5)
CHLORIDE BLD-SCNC: 100 MEQ/L (ref 98–111)
CO2: 25 MEQ/L (ref 23–33)
CREAT SERPL-MCNC: 1.3 MG/DL (ref 0.4–1.2)
EKG ATRIAL RATE: 73 BPM
EKG P AXIS: 61 DEGREES
EKG P-R INTERVAL: 208 MS
EKG Q-T INTERVAL: 406 MS
EKG QRS DURATION: 82 MS
EKG QTC CALCULATION (BAZETT): 447 MS
EKG R AXIS: -34 DEGREES
EKG T AXIS: 60 DEGREES
EKG VENTRICULAR RATE: 73 BPM
EOSINOPHIL # BLD: 0.5 %
EOSINOPHILS ABSOLUTE: 0 THOU/MM3 (ref 0–0.4)
ERYTHROCYTE [DISTWIDTH] IN BLOOD BY AUTOMATED COUNT: 13.2 % (ref 11.5–14.5)
ERYTHROCYTE [DISTWIDTH] IN BLOOD BY AUTOMATED COUNT: 47.8 FL (ref 35–45)
GFR SERPL CREATININE-BSD FRML MDRD: 52 ML/MIN/1.73M2
GLUCOSE BLD-MCNC: 98 MG/DL (ref 70–108)
HCT VFR BLD CALC: 45.3 % (ref 42–52)
HEMOGLOBIN: 15.2 GM/DL (ref 14–18)
IMMATURE GRANS (ABS): 0.01 THOU/MM3 (ref 0–0.07)
IMMATURE GRANULOCYTES: 0.2 %
LYMPHOCYTES # BLD: 32.6 %
LYMPHOCYTES ABSOLUTE: 2.1 THOU/MM3 (ref 1–4.8)
MCH RBC QN AUTO: 33.3 PG (ref 26–33)
MCHC RBC AUTO-ENTMCNC: 33.6 GM/DL (ref 32.2–35.5)
MCV RBC AUTO: 99.1 FL (ref 80–94)
MONOCYTES # BLD: 7.4 %
MONOCYTES ABSOLUTE: 0.5 THOU/MM3 (ref 0.4–1.3)
NUCLEATED RED BLOOD CELLS: 0 /100 WBC
PLATELET # BLD: 332 THOU/MM3 (ref 130–400)
PMV BLD AUTO: 8.8 FL (ref 9.4–12.4)
POTASSIUM SERPL-SCNC: 4.2 MEQ/L (ref 3.5–5.2)
RBC # BLD: 4.57 MILL/MM3 (ref 4.7–6.1)
SEG NEUTROPHILS: 58.8 %
SEGMENTED NEUTROPHILS ABSOLUTE COUNT: 3.8 THOU/MM3 (ref 1.8–7.7)
SODIUM BLD-SCNC: 141 MEQ/L (ref 135–145)
WBC # BLD: 6.5 THOU/MM3 (ref 4.8–10.8)

## 2018-12-13 PROCEDURE — 36415 COLL VENOUS BLD VENIPUNCTURE: CPT

## 2018-12-13 PROCEDURE — 71046 X-RAY EXAM CHEST 2 VIEWS: CPT

## 2018-12-13 PROCEDURE — 93010 ELECTROCARDIOGRAM REPORT: CPT | Performed by: INTERNAL MEDICINE

## 2018-12-13 PROCEDURE — 85025 COMPLETE CBC W/AUTO DIFF WBC: CPT

## 2018-12-13 PROCEDURE — 80048 BASIC METABOLIC PNL TOTAL CA: CPT

## 2018-12-13 PROCEDURE — 93005 ELECTROCARDIOGRAM TRACING: CPT

## 2019-01-04 ENCOUNTER — OFFICE VISIT (OUTPATIENT)
Dept: FAMILY MEDICINE CLINIC | Age: 84
End: 2019-01-04
Payer: MEDICARE

## 2019-01-04 ENCOUNTER — TELEPHONE (OUTPATIENT)
Dept: UROLOGY | Age: 84
End: 2019-01-04

## 2019-01-04 VITALS
HEART RATE: 76 BPM | SYSTOLIC BLOOD PRESSURE: 128 MMHG | BODY MASS INDEX: 26.28 KG/M2 | DIASTOLIC BLOOD PRESSURE: 64 MMHG | RESPIRATION RATE: 16 BRPM | WEIGHT: 183.6 LBS | HEIGHT: 70 IN | TEMPERATURE: 97.9 F

## 2019-01-04 DIAGNOSIS — N32.0 BLADDER OUTFLOW OBSTRUCTION: ICD-10-CM

## 2019-01-04 DIAGNOSIS — H61.23 BILATERAL IMPACTED CERUMEN: ICD-10-CM

## 2019-01-04 DIAGNOSIS — Z01.818 PREOPERATIVE CLEARANCE: Primary | ICD-10-CM

## 2019-01-04 DIAGNOSIS — R94.4 DECREASED GFR: ICD-10-CM

## 2019-01-04 DIAGNOSIS — N21.0 BLADDER STONES: ICD-10-CM

## 2019-01-04 DIAGNOSIS — Z01.818 PRE-OP TESTING: Primary | ICD-10-CM

## 2019-01-04 PROCEDURE — G8484 FLU IMMUNIZE NO ADMIN: HCPCS | Performed by: NURSE PRACTITIONER

## 2019-01-04 PROCEDURE — 99213 OFFICE O/P EST LOW 20 MIN: CPT | Performed by: NURSE PRACTITIONER

## 2019-01-04 PROCEDURE — G8419 CALC BMI OUT NRM PARAM NOF/U: HCPCS | Performed by: NURSE PRACTITIONER

## 2019-01-04 PROCEDURE — G8427 DOCREV CUR MEDS BY ELIG CLIN: HCPCS | Performed by: NURSE PRACTITIONER

## 2019-01-04 PROCEDURE — 1101F PT FALLS ASSESS-DOCD LE1/YR: CPT | Performed by: NURSE PRACTITIONER

## 2019-01-04 PROCEDURE — 69209 REMOVE IMPACTED EAR WAX UNI: CPT | Performed by: NURSE PRACTITIONER

## 2019-01-04 ASSESSMENT — PATIENT HEALTH QUESTIONNAIRE - PHQ9
SUM OF ALL RESPONSES TO PHQ9 QUESTIONS 1 & 2: 0
SUM OF ALL RESPONSES TO PHQ QUESTIONS 1-9: 0
SUM OF ALL RESPONSES TO PHQ QUESTIONS 1-9: 0
2. FEELING DOWN, DEPRESSED OR HOPELESS: 0
1. LITTLE INTEREST OR PLEASURE IN DOING THINGS: 0

## 2019-01-04 NOTE — TELEPHONE ENCOUNTER
MEDICAL CLEARANCE FORM  Clearance From  Brandon Covington CNP   Appointment Date  1/4/19 Time   9:00 am    Edgardo Huerta  9/21/1926  Surgeon:  Dr Florencio Ornelas    Procedure: Cystolitholapaxy,possible TUR BN- patient will have monitored anesthetic with spinal block  Date:  Pending Clearance  Facility: [facility]    I.  MEDICAL HISTORY  DM CAD PVD CVA DVT/PE MI CHFMalignant Hyperthemia HTN Tobacco/ETOH Sleep Apnea GERD Hyperlipidemia Renal Insufficiency COPD/Asthma Bleeding Disorder Pacemaker/AICD  II. CURRENT MEDICATIONS: Attach list or complete     Pt is on following meds that need special instructions for surgery:  Anticoagulants Heart Meds ASA Insulin Oral anti-diabetics NSAIDS Diuretics     K replacements  III. ALLERGIES:   IV.  FUNCTIONAL CAPACITY  >4 METS (CAN VACUUM/HOUSEWORK,CLIMB FLIGHT STAIRS WITHOUT DYSPNEA)  <4METS (FLIGHT OF STEPS CAUSES DYSPNEA/CARDIAC SYMPTOMS)   Stress Test Recommended:    Stress tests or Cardiac Cath in last 5 years:  Yes (attach report)  No   Results: WNL   ABN  Any Change in Cardiac symptoms: Yes  NO  Comments:    Revascularization in last 5 years: Yes  NO  CABG: Yes  No   Comments:     Stents:  Date: ________  Any change in cardiac symptoms  Yes  NO  Comments:   V.  REVIEW OF SYSTEMS:  (Pertinent positive or negative)    VI. PHYSICIAL EXAM  HEENT:1.  Dentations   Good Poor          HT:_______ WT:______      2. Neck Pathology: Rheumatoid DDD C-spine  BP:______ P:________  PULMONARY:  CARDIAC  ABDOMEN  EXTREMITIES  OTHER  VII.   Testing Ordered by Surgeon  Reviewed by Clearance Physician  Test      Result  Plan,if Abnormal  ___CBS     WNL  ABN____________________  ___BMP/BUN/CR    WNL  ABN____________________  ___K+      WNL  ABN____________________  ___UA      WNL  ABN____________________  ___CXR     WNL  ABN____________________  ___EKG     WNL  ABN____________________  ___MRSA     WNL  ABN____________________  VIII.  ___Acceptable risk for surgery ___Risk

## 2019-01-07 ENCOUNTER — TELEPHONE (OUTPATIENT)
Dept: FAMILY MEDICINE CLINIC | Age: 84
End: 2019-01-07

## 2019-01-07 ENCOUNTER — HOSPITAL ENCOUNTER (OUTPATIENT)
Age: 84
Discharge: HOME | End: 2019-01-07
Payer: MEDICARE

## 2019-01-07 LAB
BACTERIA: ABNORMAL /HPF
BILIRUBIN URINE: ABNORMAL
BLOOD, URINE: ABNORMAL
CASTS 2: ABNORMAL /LPF
CASTS UA: ABNORMAL /LPF
CHARACTER, URINE: ABNORMAL
COLOR: ABNORMAL
CRYSTALS, UA: ABNORMAL
EPITHELIAL CELLS, UA: ABNORMAL /HPF
GLUCOSE URINE: NEGATIVE MG/DL
ICTOTEST: NEGATIVE
KETONES, URINE: 15
LEUKOCYTE ESTERASE, URINE: ABNORMAL
MISCELLANEOUS 2: ABNORMAL
NITRITE, URINE: POSITIVE
PH UA: 8
PROTEIN UA: 300
RBC URINE: ABNORMAL /HPF
RENAL EPITHELIAL, UA: ABNORMAL
SPECIFIC GRAVITY, URINE: 1.01 (ref 1–1.03)
UROBILINOGEN, URINE: 1 EU/DL
WBC UA: ABNORMAL /HPF
YEAST: ABNORMAL

## 2019-01-07 PROCEDURE — 87077 CULTURE AEROBIC IDENTIFY: CPT

## 2019-01-07 PROCEDURE — 87086 URINE CULTURE/COLONY COUNT: CPT

## 2019-01-07 PROCEDURE — 81001 URINALYSIS AUTO W/SCOPE: CPT

## 2019-01-09 ENCOUNTER — TELEPHONE (OUTPATIENT)
Dept: UROLOGY | Age: 84
End: 2019-01-09

## 2019-01-09 LAB
ORGANISM: ABNORMAL
URINE CULTURE REFLEX: ABNORMAL

## 2019-01-09 RX ORDER — CEFDINIR 300 MG/1
300 CAPSULE ORAL 2 TIMES DAILY
Qty: 20 CAPSULE | Refills: 0 | Status: SHIPPED | OUTPATIENT
Start: 2019-01-09 | End: 2019-01-19

## 2019-01-09 NOTE — TELEPHONE ENCOUNTER
DO NOT TAKE ASPIRIN, PLAVIX, FISH OIL, GLUCOSAMINE CHONDROITIN, MULTIVITAMINS, VITAMIN A, VITAMIN E, VITAMIN B, COUMADIN, OR MOTRIN-LIKE DRUGS 7 DAYS PRIOR TO SURGERY AND 3 DAYS FOLLOWING     Samirdasiamando Mckay 9/21/1926 Diagnosis: Bladder Stones/Obstruction of bladder neck    Surgical Physician: Dr. Cherylene Gondola have been scheduled for the procedure marked below:      Surgery: Cystolitholapaxy,possible transurethral resection of bladder neck          Date: 2/5/19     Anesthesia: MAC with spinal block     Place of Service: [address] [place] [address]         Please be at the Outpatient Department Second Floor\",\"Surgery Center\" by:2:00 pm        INSTRUCTIONS AS MARKED BELOW:    1. If you are having (General/Spinal) Anesthesia:  DO NOT eat or drink anything after midnight before surgery. 2. We prefer you shower or bathe with an antibacterial soap (Dial) the morning of surgery. 3.  Please ensure to have a  with you to transport you home. 4.  Please bring a current medication list, photo ID and insurance card(s) with you  5. Okay to take Tylenol  6. If you take Glucophage or Metformin, hold 48-hours prior to surgery  7. Take blood pressure medication as directed, if taken in the morning take with a small sip of water  8. PLEASE BRING THIS LETTER WITH YOU AND SHOW IT TO THE  AT [place] [phone]. 9.   may assist with your surgery  10. Does patient have a Pace Maker? No  11. Please send a copy to the Family Dr: OLGA Connolly CNP   12. Do the uranalysis 7 days prior to surgery.   13.  Your follow up appointment is scheduled for   2/12/19   At   2:00 pm   With  DANY Espinosa    Date: 1/9/2019

## 2019-01-30 ENCOUNTER — HOSPITAL ENCOUNTER (OUTPATIENT)
Age: 84
Discharge: HOME | End: 2019-01-30
Payer: MEDICARE

## 2019-01-30 LAB
AMORPHOUS: ABNORMAL
BACTERIA: ABNORMAL /HPF
BILIRUBIN URINE: NEGATIVE
BLOOD, URINE: ABNORMAL
CASTS 2: ABNORMAL /LPF
CASTS UA: ABNORMAL /LPF
CHARACTER, URINE: ABNORMAL
COLOR: ABNORMAL
CRYSTALS, UA: ABNORMAL
EPITHELIAL CELLS, UA: ABNORMAL /HPF
GLUCOSE URINE: NEGATIVE MG/DL
KETONES, URINE: ABNORMAL
LEUKOCYTE ESTERASE, URINE: ABNORMAL
MISCELLANEOUS 2: ABNORMAL
MUCUS: ABNORMAL
NITRITE, URINE: NEGATIVE
PH UA: 7.5
PROTEIN UA: 100
RBC URINE: ABNORMAL /HPF
RENAL EPITHELIAL, UA: ABNORMAL
SPECIFIC GRAVITY, URINE: 1.01 (ref 1–1.03)
UROBILINOGEN, URINE: 1 EU/DL
WBC UA: > 200 /HPF
YEAST: ABNORMAL

## 2019-01-30 PROCEDURE — 81001 URINALYSIS AUTO W/SCOPE: CPT

## 2019-01-30 PROCEDURE — 87186 SC STD MICRODIL/AGAR DIL: CPT

## 2019-01-30 PROCEDURE — 87077 CULTURE AEROBIC IDENTIFY: CPT

## 2019-01-30 PROCEDURE — 87086 URINE CULTURE/COLONY COUNT: CPT

## 2019-02-01 ENCOUNTER — TELEPHONE (OUTPATIENT)
Dept: UROLOGY | Age: 84
End: 2019-02-01

## 2019-02-01 LAB
ORGANISM: ABNORMAL
URINE CULTURE REFLEX: ABNORMAL

## 2019-02-01 RX ORDER — NITROFURANTOIN 25; 75 MG/1; MG/1
100 CAPSULE ORAL 2 TIMES DAILY
Qty: 14 CAPSULE | Refills: 0 | Status: ON HOLD | OUTPATIENT
Start: 2019-02-01 | End: 2019-02-06

## 2019-02-05 ENCOUNTER — ANESTHESIA (OUTPATIENT)
Dept: OPERATING ROOM | Age: 84
End: 2019-02-05
Payer: MEDICARE

## 2019-02-05 ENCOUNTER — ANESTHESIA EVENT (OUTPATIENT)
Dept: OPERATING ROOM | Age: 84
End: 2019-02-05
Payer: MEDICARE

## 2019-02-05 ENCOUNTER — HOSPITAL ENCOUNTER (OUTPATIENT)
Age: 84
Discharge: HOME | End: 2019-02-06
Attending: UROLOGY | Admitting: UROLOGY
Payer: MEDICARE

## 2019-02-05 VITALS
SYSTOLIC BLOOD PRESSURE: 149 MMHG | RESPIRATION RATE: 21 BRPM | TEMPERATURE: 97.3 F | DIASTOLIC BLOOD PRESSURE: 103 MMHG | OXYGEN SATURATION: 100 %

## 2019-02-05 DIAGNOSIS — N32.0 BLADDER OUTFLOW OBSTRUCTION: Primary | ICD-10-CM

## 2019-02-05 PROBLEM — N21.0 BLADDER STONES: Status: ACTIVE | Noted: 2019-02-05

## 2019-02-05 PROCEDURE — 2580000003 HC RX 258: Performed by: UROLOGY

## 2019-02-05 PROCEDURE — 88342 IMHCHEM/IMCYTCHM 1ST ANTB: CPT

## 2019-02-05 PROCEDURE — 52500 TRURL RESECTION BLADDER NECK: CPT | Performed by: UROLOGY

## 2019-02-05 PROCEDURE — G0378 HOSPITAL OBSERVATION PER HR: HCPCS

## 2019-02-05 PROCEDURE — 3600000003 HC SURGERY LEVEL 3 BASE: Performed by: UROLOGY

## 2019-02-05 PROCEDURE — 6360000002 HC RX W HCPCS: Performed by: REGISTERED NURSE

## 2019-02-05 PROCEDURE — 3600000013 HC SURGERY LEVEL 3 ADDTL 15MIN: Performed by: UROLOGY

## 2019-02-05 PROCEDURE — 6360000002 HC RX W HCPCS: Performed by: UROLOGY

## 2019-02-05 PROCEDURE — 2709999900 HC NON-CHARGEABLE SUPPLY: Performed by: UROLOGY

## 2019-02-05 PROCEDURE — 2720000010 HC SURG SUPPLY STERILE: Performed by: UROLOGY

## 2019-02-05 PROCEDURE — 6370000000 HC RX 637 (ALT 250 FOR IP): Performed by: NURSE PRACTITIONER

## 2019-02-05 PROCEDURE — 88341 IMHCHEM/IMCYTCHM EA ADD ANTB: CPT

## 2019-02-05 PROCEDURE — 3700000000 HC ANESTHESIA ATTENDED CARE: Performed by: UROLOGY

## 2019-02-05 PROCEDURE — 82365 CALCULUS SPECTROSCOPY: CPT

## 2019-02-05 PROCEDURE — 2580000003 HC RX 258: Performed by: REGISTERED NURSE

## 2019-02-05 PROCEDURE — 2500000003 HC RX 250 WO HCPCS: Performed by: REGISTERED NURSE

## 2019-02-05 PROCEDURE — 99999 PR OFFICE/OUTPT VISIT,PROCEDURE ONLY: CPT | Performed by: UROLOGY

## 2019-02-05 PROCEDURE — 7100000000 HC PACU RECOVERY - FIRST 15 MIN: Performed by: UROLOGY

## 2019-02-05 PROCEDURE — 88307 TISSUE EXAM BY PATHOLOGIST: CPT

## 2019-02-05 PROCEDURE — 3700000001 HC ADD 15 MINUTES (ANESTHESIA): Performed by: UROLOGY

## 2019-02-05 PROCEDURE — 7100000001 HC PACU RECOVERY - ADDTL 15 MIN: Performed by: UROLOGY

## 2019-02-05 PROCEDURE — 52318 REMOVE BLADDER STONE: CPT | Performed by: UROLOGY

## 2019-02-05 RX ORDER — AMLODIPINE BESYLATE 10 MG/1
10 TABLET ORAL DAILY
Status: DISCONTINUED | OUTPATIENT
Start: 2019-02-05 | End: 2019-02-06 | Stop reason: HOSPADM

## 2019-02-05 RX ORDER — FENTANYL CITRATE 50 UG/ML
INJECTION, SOLUTION INTRAMUSCULAR; INTRAVENOUS PRN
Status: DISCONTINUED | OUTPATIENT
Start: 2019-02-05 | End: 2019-02-05 | Stop reason: SDUPTHER

## 2019-02-05 RX ORDER — ATROPA BELLADONNA AND OPIUM 16.2; 3 MG/1; MG/1
30 SUPPOSITORY RECTAL EVERY 8 HOURS PRN
Status: DISCONTINUED | OUTPATIENT
Start: 2019-02-05 | End: 2019-02-06 | Stop reason: HOSPADM

## 2019-02-05 RX ORDER — METOCLOPRAMIDE HYDROCHLORIDE 5 MG/ML
10 INJECTION INTRAMUSCULAR; INTRAVENOUS
Status: DISCONTINUED | OUTPATIENT
Start: 2019-02-05 | End: 2019-02-05 | Stop reason: HOSPADM

## 2019-02-05 RX ORDER — PROMETHAZINE HYDROCHLORIDE 25 MG/ML
12.5 INJECTION, SOLUTION INTRAMUSCULAR; INTRAVENOUS
Status: DISCONTINUED | OUTPATIENT
Start: 2019-02-05 | End: 2019-02-05 | Stop reason: HOSPADM

## 2019-02-05 RX ORDER — SODIUM CHLORIDE 9 MG/ML
INJECTION, SOLUTION INTRAVENOUS CONTINUOUS
Status: DISCONTINUED | OUTPATIENT
Start: 2019-02-05 | End: 2019-02-06 | Stop reason: HOSPADM

## 2019-02-05 RX ORDER — LIDOCAINE HYDROCHLORIDE 10 MG/ML
INJECTION, SOLUTION INFILTRATION; PERINEURAL PRN
Status: DISCONTINUED | OUTPATIENT
Start: 2019-02-05 | End: 2019-02-05 | Stop reason: SDUPTHER

## 2019-02-05 RX ORDER — FENTANYL CITRATE 50 UG/ML
50 INJECTION, SOLUTION INTRAMUSCULAR; INTRAVENOUS EVERY 5 MIN PRN
Status: DISCONTINUED | OUTPATIENT
Start: 2019-02-05 | End: 2019-02-05 | Stop reason: HOSPADM

## 2019-02-05 RX ORDER — LABETALOL HYDROCHLORIDE 5 MG/ML
5 INJECTION, SOLUTION INTRAVENOUS EVERY 10 MIN PRN
Status: DISCONTINUED | OUTPATIENT
Start: 2019-02-05 | End: 2019-02-05 | Stop reason: HOSPADM

## 2019-02-05 RX ORDER — HYDROCODONE BITARTRATE AND ACETAMINOPHEN 5; 325 MG/1; MG/1
2 TABLET ORAL EVERY 4 HOURS PRN
Status: DISCONTINUED | OUTPATIENT
Start: 2019-02-05 | End: 2019-02-06 | Stop reason: HOSPADM

## 2019-02-05 RX ORDER — HYDROCODONE BITARTRATE AND ACETAMINOPHEN 5; 325 MG/1; MG/1
1 TABLET ORAL EVERY 4 HOURS PRN
Status: DISCONTINUED | OUTPATIENT
Start: 2019-02-05 | End: 2019-02-06 | Stop reason: HOSPADM

## 2019-02-05 RX ORDER — FENTANYL CITRATE 50 UG/ML
25 INJECTION, SOLUTION INTRAMUSCULAR; INTRAVENOUS EVERY 5 MIN PRN
Status: DISCONTINUED | OUTPATIENT
Start: 2019-02-05 | End: 2019-02-05 | Stop reason: HOSPADM

## 2019-02-05 RX ORDER — LEVOTHYROXINE SODIUM 0.1 MG/1
100 TABLET ORAL DAILY
Status: DISCONTINUED | OUTPATIENT
Start: 2019-02-06 | End: 2019-02-06 | Stop reason: HOSPADM

## 2019-02-05 RX ORDER — MEPERIDINE HYDROCHLORIDE 25 MG/ML
12.5 INJECTION INTRAMUSCULAR; INTRAVENOUS; SUBCUTANEOUS EVERY 5 MIN PRN
Status: DISCONTINUED | OUTPATIENT
Start: 2019-02-05 | End: 2019-02-05 | Stop reason: HOSPADM

## 2019-02-05 RX ORDER — ACETAMINOPHEN 325 MG/1
650 TABLET ORAL EVERY 4 HOURS PRN
Status: DISCONTINUED | OUTPATIENT
Start: 2019-02-05 | End: 2019-02-06 | Stop reason: HOSPADM

## 2019-02-05 RX ORDER — CIPROFLOXACIN 2 MG/ML
400 INJECTION, SOLUTION INTRAVENOUS
Status: COMPLETED | OUTPATIENT
Start: 2019-02-05 | End: 2019-02-05

## 2019-02-05 RX ORDER — DIPHENHYDRAMINE HYDROCHLORIDE 50 MG/ML
12.5 INJECTION INTRAMUSCULAR; INTRAVENOUS
Status: DISCONTINUED | OUTPATIENT
Start: 2019-02-05 | End: 2019-02-05 | Stop reason: HOSPADM

## 2019-02-05 RX ORDER — HYDROCODONE BITARTRATE AND ACETAMINOPHEN 5; 325 MG/1; MG/1
1 TABLET ORAL EVERY 4 HOURS PRN
Qty: 10 TABLET | Refills: 0 | Status: SHIPPED | OUTPATIENT
Start: 2019-02-05 | End: 2019-02-08

## 2019-02-05 RX ORDER — SODIUM CHLORIDE, SODIUM LACTATE, POTASSIUM CHLORIDE, CALCIUM CHLORIDE 600; 310; 30; 20 MG/100ML; MG/100ML; MG/100ML; MG/100ML
INJECTION, SOLUTION INTRAVENOUS CONTINUOUS PRN
Status: DISCONTINUED | OUTPATIENT
Start: 2019-02-05 | End: 2019-02-05 | Stop reason: SDUPTHER

## 2019-02-05 RX ORDER — ONDANSETRON 2 MG/ML
INJECTION INTRAMUSCULAR; INTRAVENOUS PRN
Status: DISCONTINUED | OUTPATIENT
Start: 2019-02-05 | End: 2019-02-05 | Stop reason: SDUPTHER

## 2019-02-05 RX ORDER — ROCURONIUM BROMIDE 10 MG/ML
INJECTION, SOLUTION INTRAVENOUS PRN
Status: DISCONTINUED | OUTPATIENT
Start: 2019-02-05 | End: 2019-02-05 | Stop reason: SDUPTHER

## 2019-02-05 RX ORDER — PROPOFOL 10 MG/ML
INJECTION, EMULSION INTRAVENOUS PRN
Status: DISCONTINUED | OUTPATIENT
Start: 2019-02-05 | End: 2019-02-05 | Stop reason: SDUPTHER

## 2019-02-05 RX ORDER — ONDANSETRON 2 MG/ML
4 INJECTION INTRAMUSCULAR; INTRAVENOUS EVERY 4 HOURS PRN
Status: DISCONTINUED | OUTPATIENT
Start: 2019-02-05 | End: 2019-02-06 | Stop reason: HOSPADM

## 2019-02-05 RX ORDER — NITROFURANTOIN 25; 75 MG/1; MG/1
100 CAPSULE ORAL EVERY 12 HOURS SCHEDULED
Status: DISCONTINUED | OUTPATIENT
Start: 2019-02-05 | End: 2019-02-06 | Stop reason: HOSPADM

## 2019-02-05 RX ORDER — DEXAMETHASONE SODIUM PHOSPHATE 4 MG/ML
INJECTION, SOLUTION INTRA-ARTICULAR; INTRALESIONAL; INTRAMUSCULAR; INTRAVENOUS; SOFT TISSUE PRN
Status: DISCONTINUED | OUTPATIENT
Start: 2019-02-05 | End: 2019-02-05 | Stop reason: SDUPTHER

## 2019-02-05 RX ORDER — GLYCOPYRROLATE 1 MG/5 ML
SYRINGE (ML) INTRAVENOUS PRN
Status: DISCONTINUED | OUTPATIENT
Start: 2019-02-05 | End: 2019-02-05 | Stop reason: SDUPTHER

## 2019-02-05 RX ADMIN — DEXAMETHASONE SODIUM PHOSPHATE 8 MG: 4 INJECTION, SOLUTION INTRAMUSCULAR; INTRAVENOUS at 17:31

## 2019-02-05 RX ADMIN — PROPOFOL 100 MG: 10 INJECTION, EMULSION INTRAVENOUS at 17:23

## 2019-02-05 RX ADMIN — PHENYLEPHRINE HYDROCHLORIDE 100 MCG: 10 INJECTION INTRAVENOUS at 18:24

## 2019-02-05 RX ADMIN — AMLODIPINE BESYLATE 10 MG: 10 TABLET ORAL at 21:35

## 2019-02-05 RX ADMIN — SODIUM CHLORIDE: 9 INJECTION, SOLUTION INTRAVENOUS at 15:08

## 2019-02-05 RX ADMIN — PHENYLEPHRINE HYDROCHLORIDE 100 MCG: 10 INJECTION INTRAVENOUS at 18:00

## 2019-02-05 RX ADMIN — PHENYLEPHRINE HYDROCHLORIDE 100 MCG: 10 INJECTION INTRAVENOUS at 18:41

## 2019-02-05 RX ADMIN — FENTANYL CITRATE 100 MCG: 50 INJECTION INTRAMUSCULAR; INTRAVENOUS at 17:23

## 2019-02-05 RX ADMIN — PHENYLEPHRINE HYDROCHLORIDE 100 MCG: 10 INJECTION INTRAVENOUS at 18:18

## 2019-02-05 RX ADMIN — NITROFURANTOIN MONOHYDRATE AND NITROFURANTOIN MACROCRYSTALLINE 100 MG: 75; 25 CAPSULE ORAL at 21:36

## 2019-02-05 RX ADMIN — LIDOCAINE HYDROCHLORIDE 60 MG: 10 INJECTION, SOLUTION INFILTRATION; PERINEURAL at 17:23

## 2019-02-05 RX ADMIN — ROCURONIUM BROMIDE 20 MG: 10 INJECTION INTRAVENOUS at 17:23

## 2019-02-05 RX ADMIN — Medication 0.2 MG: at 17:20

## 2019-02-05 RX ADMIN — SODIUM CHLORIDE: 9 INJECTION, SOLUTION INTRAVENOUS at 21:36

## 2019-02-05 RX ADMIN — FENTANYL CITRATE 100 MCG: 50 INJECTION INTRAMUSCULAR; INTRAVENOUS at 17:41

## 2019-02-05 RX ADMIN — CIPROFLOXACIN 400 MG: 2 INJECTION, SOLUTION INTRAVENOUS at 17:17

## 2019-02-05 RX ADMIN — SODIUM CHLORIDE, POTASSIUM CHLORIDE, SODIUM LACTATE AND CALCIUM CHLORIDE: 600; 310; 30; 20 INJECTION, SOLUTION INTRAVENOUS at 17:51

## 2019-02-05 RX ADMIN — ONDANSETRON HYDROCHLORIDE 4 MG: 4 INJECTION, SOLUTION INTRAMUSCULAR; INTRAVENOUS at 19:40

## 2019-02-05 ASSESSMENT — PULMONARY FUNCTION TESTS
PIF_VALUE: 18
PIF_VALUE: 2
PIF_VALUE: 18
PIF_VALUE: 18
PIF_VALUE: 0
PIF_VALUE: 18
PIF_VALUE: 20
PIF_VALUE: 18
PIF_VALUE: 5
PIF_VALUE: 18
PIF_VALUE: 1
PIF_VALUE: 18
PIF_VALUE: 4
PIF_VALUE: 18
PIF_VALUE: 19
PIF_VALUE: 18
PIF_VALUE: 2
PIF_VALUE: 18
PIF_VALUE: 8
PIF_VALUE: 18
PIF_VALUE: 18
PIF_VALUE: 28
PIF_VALUE: 18
PIF_VALUE: 22
PIF_VALUE: 19
PIF_VALUE: 18
PIF_VALUE: 10
PIF_VALUE: 18
PIF_VALUE: 19
PIF_VALUE: 18
PIF_VALUE: 22
PIF_VALUE: 18
PIF_VALUE: 4
PIF_VALUE: 19
PIF_VALUE: 18
PIF_VALUE: 0
PIF_VALUE: 18
PIF_VALUE: 19
PIF_VALUE: 18
PIF_VALUE: 18
PIF_VALUE: 9
PIF_VALUE: 18
PIF_VALUE: 19
PIF_VALUE: 18
PIF_VALUE: 18
PIF_VALUE: 19
PIF_VALUE: 5
PIF_VALUE: 1
PIF_VALUE: 18
PIF_VALUE: 19
PIF_VALUE: 19
PIF_VALUE: 18
PIF_VALUE: 10
PIF_VALUE: 18
PIF_VALUE: 45
PIF_VALUE: 18
PIF_VALUE: 6
PIF_VALUE: 18
PIF_VALUE: 5
PIF_VALUE: 18
PIF_VALUE: 20
PIF_VALUE: 1
PIF_VALUE: 18
PIF_VALUE: 19
PIF_VALUE: 18
PIF_VALUE: 19
PIF_VALUE: 18
PIF_VALUE: 1
PIF_VALUE: 7
PIF_VALUE: 18
PIF_VALUE: 18

## 2019-02-05 ASSESSMENT — PAIN SCALES - GENERAL
PAINLEVEL_OUTOF10: 0
PAINLEVEL_OUTOF10: 0

## 2019-02-06 ENCOUNTER — TELEPHONE (OUTPATIENT)
Dept: FAMILY MEDICINE CLINIC | Age: 84
End: 2019-02-06

## 2019-02-06 VITALS
SYSTOLIC BLOOD PRESSURE: 164 MMHG | HEART RATE: 68 BPM | WEIGHT: 181.6 LBS | RESPIRATION RATE: 16 BRPM | BODY MASS INDEX: 25.42 KG/M2 | OXYGEN SATURATION: 97 % | HEIGHT: 71 IN | DIASTOLIC BLOOD PRESSURE: 80 MMHG | TEMPERATURE: 98.1 F

## 2019-02-06 PROCEDURE — 51700 IRRIGATION OF BLADDER: CPT

## 2019-02-06 PROCEDURE — 96361 HYDRATE IV INFUSION ADD-ON: CPT

## 2019-02-06 PROCEDURE — 99024 POSTOP FOLLOW-UP VISIT: CPT | Performed by: NURSE PRACTITIONER

## 2019-02-06 PROCEDURE — 2709999900 HC NON-CHARGEABLE SUPPLY

## 2019-02-06 PROCEDURE — 2580000003 HC RX 258: Performed by: UROLOGY

## 2019-02-06 PROCEDURE — G0378 HOSPITAL OBSERVATION PER HR: HCPCS

## 2019-02-06 RX ORDER — AMLODIPINE BESYLATE 10 MG/1
10 TABLET ORAL DAILY
Qty: 30 TABLET | Refills: 0 | Status: SHIPPED | OUTPATIENT
Start: 2019-02-06 | End: 2019-02-06 | Stop reason: SDUPTHER

## 2019-02-06 RX ORDER — LEVOTHYROXINE SODIUM 0.1 MG/1
100 TABLET ORAL DAILY
Qty: 90 TABLET | Refills: 1 | Status: SHIPPED | OUTPATIENT
Start: 2019-02-06 | End: 2019-07-19 | Stop reason: SDUPTHER

## 2019-02-06 RX ORDER — NITROFURANTOIN 25; 75 MG/1; MG/1
100 CAPSULE ORAL 2 TIMES DAILY
Qty: 20 CAPSULE | Refills: 0 | Status: SHIPPED | OUTPATIENT
Start: 2019-02-06 | End: 2019-02-16

## 2019-02-06 RX ORDER — AMLODIPINE BESYLATE 10 MG/1
10 TABLET ORAL DAILY
Qty: 90 TABLET | Refills: 1 | Status: SHIPPED | OUTPATIENT
Start: 2019-02-06 | End: 2019-10-15 | Stop reason: SDUPTHER

## 2019-02-06 RX ADMIN — AMLODIPINE BESYLATE 10 MG: 10 TABLET ORAL at 08:01

## 2019-02-06 RX ADMIN — SODIUM CHLORIDE: 9 INJECTION, SOLUTION INTRAVENOUS at 06:01

## 2019-02-06 RX ADMIN — LEVOTHYROXINE SODIUM 100 MCG: 0.1 TABLET ORAL at 06:01

## 2019-02-06 RX ADMIN — NITROFURANTOIN MONOHYDRATE AND NITROFURANTOIN MACROCRYSTALLINE 100 MG: 75; 25 CAPSULE ORAL at 08:01

## 2019-02-06 ASSESSMENT — PAIN SCALES - GENERAL: PAINLEVEL_OUTOF10: 0

## 2019-02-07 ENCOUNTER — TELEPHONE (OUTPATIENT)
Dept: FAMILY MEDICINE CLINIC | Age: 84
End: 2019-02-07

## 2019-02-08 ENCOUNTER — NURSE ONLY (OUTPATIENT)
Dept: UROLOGY | Age: 84
End: 2019-02-08

## 2019-02-08 ENCOUNTER — TELEPHONE (OUTPATIENT)
Dept: UROLOGY | Age: 84
End: 2019-02-08

## 2019-02-10 LAB — STONE ANALYSIS: NORMAL

## 2019-02-12 ENCOUNTER — OFFICE VISIT (OUTPATIENT)
Dept: FAMILY MEDICINE CLINIC | Age: 84
End: 2019-02-12
Payer: MEDICARE

## 2019-02-12 ENCOUNTER — OFFICE VISIT (OUTPATIENT)
Dept: UROLOGY | Age: 84
End: 2019-02-12
Payer: MEDICARE

## 2019-02-12 VITALS
BODY MASS INDEX: 25.51 KG/M2 | HEART RATE: 84 BPM | DIASTOLIC BLOOD PRESSURE: 72 MMHG | SYSTOLIC BLOOD PRESSURE: 144 MMHG | HEIGHT: 71 IN | WEIGHT: 182.2 LBS | RESPIRATION RATE: 16 BRPM | TEMPERATURE: 98 F

## 2019-02-12 VITALS
HEIGHT: 72 IN | SYSTOLIC BLOOD PRESSURE: 118 MMHG | DIASTOLIC BLOOD PRESSURE: 70 MMHG | WEIGHT: 186 LBS | BODY MASS INDEX: 25.19 KG/M2

## 2019-02-12 DIAGNOSIS — C61 PROSTATE CANCER (HCC): Primary | ICD-10-CM

## 2019-02-12 DIAGNOSIS — L85.8 CUTANEOUS HORN: Primary | ICD-10-CM

## 2019-02-12 DIAGNOSIS — N40.1 BPH WITH OBSTRUCTION/LOWER URINARY TRACT SYMPTOMS: ICD-10-CM

## 2019-02-12 DIAGNOSIS — N32.0 BLADDER NECK CONTRACTURE: ICD-10-CM

## 2019-02-12 DIAGNOSIS — N13.8 BPH WITH OBSTRUCTION/LOWER URINARY TRACT SYMPTOMS: ICD-10-CM

## 2019-02-12 DIAGNOSIS — Z87.448 HISTORY OF BLADDER STONE: ICD-10-CM

## 2019-02-12 PROBLEM — R31.9 HEMATURIA: Status: RESOLVED | Noted: 2017-01-14 | Resolved: 2019-02-12

## 2019-02-12 PROBLEM — N40.0 PROSTATISM: Status: RESOLVED | Noted: 2017-01-14 | Resolved: 2019-02-12

## 2019-02-12 LAB
BILIRUBIN URINE: ABNORMAL
BLOOD URINE, POC: ABNORMAL
CHARACTER, URINE: CLEAR
COLOR, URINE: YELLOW
GLUCOSE URINE: NEGATIVE MG/DL
KETONES, URINE: ABNORMAL
LEUKOCYTE CLUMPS, URINE: ABNORMAL
NITRITE, URINE: NEGATIVE
PH, URINE: 5.5
POST VOID RESIDUAL (PVR): 19 ML
PROTEIN, URINE: >= 300 MG/DL
SPECIFIC GRAVITY, URINE: >= 1.03 (ref 1–1.03)
UROBILINOGEN, URINE: 0.2 EU/DL

## 2019-02-12 PROCEDURE — G8484 FLU IMMUNIZE NO ADMIN: HCPCS | Performed by: NURSE PRACTITIONER

## 2019-02-12 PROCEDURE — G8419 CALC BMI OUT NRM PARAM NOF/U: HCPCS | Performed by: NURSE PRACTITIONER

## 2019-02-12 PROCEDURE — G8427 DOCREV CUR MEDS BY ELIG CLIN: HCPCS | Performed by: NURSE PRACTITIONER

## 2019-02-12 PROCEDURE — 1123F ACP DISCUSS/DSCN MKR DOCD: CPT | Performed by: NURSE PRACTITIONER

## 2019-02-12 PROCEDURE — 1101F PT FALLS ASSESS-DOCD LE1/YR: CPT | Performed by: NURSE PRACTITIONER

## 2019-02-12 PROCEDURE — 51798 US URINE CAPACITY MEASURE: CPT | Performed by: NURSE PRACTITIONER

## 2019-02-12 PROCEDURE — 4040F PNEUMOC VAC/ADMIN/RCVD: CPT | Performed by: NURSE PRACTITIONER

## 2019-02-12 PROCEDURE — 81003 URINALYSIS AUTO W/O SCOPE: CPT | Performed by: NURSE PRACTITIONER

## 2019-02-12 PROCEDURE — 99213 OFFICE O/P EST LOW 20 MIN: CPT | Performed by: NURSE PRACTITIONER

## 2019-02-12 PROCEDURE — 1036F TOBACCO NON-USER: CPT | Performed by: NURSE PRACTITIONER

## 2019-02-12 PROCEDURE — 99024 POSTOP FOLLOW-UP VISIT: CPT | Performed by: NURSE PRACTITIONER

## 2019-02-12 PROCEDURE — 11200 RMVL SKIN TAGS UP TO&INC 15: CPT | Performed by: NURSE PRACTITIONER

## 2019-02-14 ENCOUNTER — TELEPHONE (OUTPATIENT)
Dept: FAMILY MEDICINE CLINIC | Age: 84
End: 2019-02-14

## 2019-02-15 ENCOUNTER — TELEPHONE (OUTPATIENT)
Dept: FAMILY MEDICINE CLINIC | Age: 84
End: 2019-02-15

## 2019-02-18 ENCOUNTER — TELEPHONE (OUTPATIENT)
Dept: FAMILY MEDICINE CLINIC | Age: 84
End: 2019-02-18

## 2019-02-19 ENCOUNTER — TELEPHONE (OUTPATIENT)
Dept: FAMILY MEDICINE CLINIC | Age: 84
End: 2019-02-19

## 2019-02-20 ENCOUNTER — TELEPHONE (OUTPATIENT)
Dept: FAMILY MEDICINE CLINIC | Age: 84
End: 2019-02-20

## 2019-03-25 ENCOUNTER — NURSE ONLY (OUTPATIENT)
Dept: LAB | Age: 84
End: 2019-03-25

## 2019-03-25 DIAGNOSIS — Z01.818 PRE-OP TESTING: ICD-10-CM

## 2019-03-25 LAB
AMORPHOUS: ABNORMAL
BACTERIA: ABNORMAL /HPF
BILIRUBIN URINE: NEGATIVE
BLOOD, URINE: ABNORMAL
CASTS 2: ABNORMAL /LPF
CASTS UA: ABNORMAL /LPF
CHARACTER, URINE: ABNORMAL
COLOR: ABNORMAL
CRYSTALS, UA: ABNORMAL
EPITHELIAL CELLS, UA: ABNORMAL /HPF
GLUCOSE URINE: NEGATIVE MG/DL
KETONES, URINE: NEGATIVE
LEUKOCYTE ESTERASE, URINE: ABNORMAL
MISCELLANEOUS 2: ABNORMAL
NITRITE, URINE: NEGATIVE
PH UA: 6.5 (ref 5–9)
PROSTATE SPECIFIC ANTIGEN: 0.9 NG/ML (ref 0–1)
PROTEIN UA: 100
RBC URINE: ABNORMAL /HPF
RENAL EPITHELIAL, UA: ABNORMAL
SPECIFIC GRAVITY, URINE: 1.02 (ref 1–1.03)
UROBILINOGEN, URINE: 0.2 EU/DL (ref 0–1)
WBC UA: > 200 /HPF
YEAST: ABNORMAL

## 2019-03-27 LAB
ORGANISM: ABNORMAL
URINE CULTURE REFLEX: ABNORMAL

## 2019-04-01 ENCOUNTER — PROCEDURE VISIT (OUTPATIENT)
Dept: UROLOGY | Age: 84
End: 2019-04-01
Payer: MEDICARE

## 2019-04-01 ENCOUNTER — TELEPHONE (OUTPATIENT)
Dept: UROLOGY | Age: 84
End: 2019-04-01

## 2019-04-01 VITALS
DIASTOLIC BLOOD PRESSURE: 68 MMHG | SYSTOLIC BLOOD PRESSURE: 128 MMHG | HEIGHT: 71 IN | WEIGHT: 184.6 LBS | BODY MASS INDEX: 25.84 KG/M2

## 2019-04-01 DIAGNOSIS — N32.0 BLADDER NECK CONTRACTURE: Primary | ICD-10-CM

## 2019-04-01 DIAGNOSIS — Z01.818 PRE-OP TESTING: ICD-10-CM

## 2019-04-01 DIAGNOSIS — I10 ESSENTIAL HYPERTENSION: ICD-10-CM

## 2019-04-01 DIAGNOSIS — I95.1 ORTHOSTATIC HYPOTENSION: ICD-10-CM

## 2019-04-01 PROCEDURE — 99999 PR OFFICE/OUTPT VISIT,PROCEDURE ONLY: CPT | Performed by: UROLOGY

## 2019-04-01 PROCEDURE — 52000 CYSTOURETHROSCOPY: CPT | Performed by: UROLOGY

## 2019-04-01 NOTE — TELEPHONE ENCOUNTER
MEDICAL CLEARANCE FORM    Clearance From  Jad Lincoln CNP   Appointment Date   Time       Mercedes Camargo  9/21/1926  Surgeon:  Dr DONNA Lafleur    Procedure:  Transurethral resection of bladder neck  Date:  TBA  Facility: [facility]    I.  MEDICAL HISTORY  DM CAD PVD CVA DVT/PE MI CHFMalignant Hyperthemia HTN Tobacco/ETOH Sleep Apnea GERD Hyperlipidemia Renal Insufficiency COPD/Asthma Bleeding Disorder Pacemaker/AICD  II. CURRENT MEDICATIONS: Attach list or complete     Pt is on following meds that need special instructions for surgery:  Anticoagulants Heart Meds ASA Insulin Oral anti-diabetics NSAIDS Diuretics     K replacements  III. ALLERGIES:   IV.  FUNCTIONAL CAPACITY  >4 METS (CAN VACUUM/HOUSEWORK,CLIMB FLIGHT STAIRS WITHOUT DYSPNEA)  <4METS (FLIGHT OF STEPS CAUSES DYSPNEA/CARDIAC SYMPTOMS)   Stress Test Recommended:    Stress tests or Cardiac Cath in last 5 years:  Yes (attach report)  No   Results: WNL   ABN  Any Change in Cardiac symptoms: Yes  NO  Comments:    Revascularization in last 5 years: Yes  NO  CABG: Yes  No   Comments:     Stents:  Date: ________  Any change in cardiac symptoms  Yes  NO  Comments:   V.  REVIEW OF SYSTEMS:  (Pertinent positive or negative)    VI. PHYSICIAL EXAM  HEENT:1.  Dentations   Good Poor          HT:_______ WT:______      2. Neck Pathology: Rheumatoid DDD C-spine  BP:______ P:________  PULMONARY:  CARDIAC  ABDOMEN  EXTREMITIES  OTHER  VII.   Testing Ordered by Surgeon  Reviewed by Clearance Physician  Test      Result  Plan,if Abnormal  ___CBS     WNL  ABN____________________  ___BMP/BUN/CR    WNL  ABN____________________  ___K+      WNL  ABN____________________  ___UA      WNL  ABN____________________  ___CXR     WNL  ABN____________________  ___EKG     WNL  ABN____________________  ___MRSA     WNL  ABN____________________  VIII.  ___Acceptable risk for surgery ___Risk Unacceptable-Communication to Follow Comments:_____________________________________________________  ________________________________________________________________________  Physician ___________________________  Date:_______________  Physician Printed Name:  _________________________    Paul Shanks  [phone]

## 2019-04-01 NOTE — TELEPHONE ENCOUNTER
Dr Shukri Terry is asking for medical clearance from Anita Nieves CNPBeth For the pending surgery date for the transurethral resection of bladder neck. Patient was cleared for a surgery with Dr Shukri Terry in 2/5/2019.     Markel Cooper

## 2019-04-01 NOTE — PROGRESS NOTES
Mr. Joe Evans was seen in follow up for cystoscopy as per plan developed by OLGA Ivey. Cystoscopy was performed without difficulty and it was well tolerated. Past Medical History:   Diagnosis Date    Cancer [facility]) 2000    Prostate    Hyperlipidemia     Hypertension     Kidney stone     Thyroid disease        Past Surgical History:   Procedure Laterality Date    HERNIA REPAIR      INGUINAL HERNIA REPAIR Right 03/30/2017    with mesh--Dr. Erica Madden    OTHER SURGICAL HISTORY Right 2007    Bad sprain    PROSTATE BIOPSY      prostate cancer with radiation    TURP  03/30/2017       Current Outpatient Medications on File Prior to Visit   Medication Sig Dispense Refill    amLODIPine (NORVASC) 10 MG tablet Take 1 tablet by mouth daily 90 tablet 1    levothyroxine (SYNTHROID) 100 MCG tablet Take 1 tablet by mouth Daily 90 tablet 1    vitamin D (CHOLECALCIFEROL) 1000 UNIT TABS tablet Take 1,000 Units by mouth daily       No current facility-administered medications on file prior to visit. Allergies   Allergen Reactions    Penicillins      Unsure-hasn't taken in over 40 yrs. Family History   Problem Relation Age of Onset    Cancer Mother 80    Stroke Mother     High Blood Pressure Mother     High Blood Pressure Father        Social History     Socioeconomic History    Marital status:       Spouse name: Not on file    Number of children: Not on file    Years of education: Not on file    Highest education level: Not on file   Occupational History    Not on file   Social Needs    Financial resource strain: Not on file    Food insecurity:     Worry: Not on file     Inability: Not on file    Transportation needs:     Medical: Not on file     Non-medical: Not on file   Tobacco Use    Smoking status: Never Smoker    Smokeless tobacco: Never Used   Substance and Sexual Activity    Alcohol use: Yes     Comment: Rarely    Drug use: No    Sexual activity: Not on file Lifestyle    Physical activity:     Days per week: Not on file     Minutes per session: Not on file    Stress: Not on file   Relationships    Social connections:     Talks on phone: Not on file     Gets together: Not on file     Attends Shinto service: Not on file     Active member of club or organization: Not on file     Attends meetings of clubs or organizations: Not on file     Relationship status: Not on file    Intimate partner violence:     Fear of current or ex partner: Not on file     Emotionally abused: Not on file     Physically abused: Not on file     Forced sexual activity: Not on file   Other Topics Concern    Not on file   Social History Narrative    Not on file       Review of Systems  No problems with ears, nose or throat. No problems with eyes. No chest pain, shortness of breath, abdominal pain, extremity pain or weakness, and no neurological deficits. No rashes. No swollen glands or lymph nodes.  symptoms per HPI. The remainder of the review of symptoms is negative. Exam  General: alert and oriented. Cooperative. HENT: Normocephalic, Atraumatic. Eyes: No scleral icterus, mucous membranes moist.  Respiratory: Effort normal.   Skin: No rashes or obvious lesions. Labs    No results found for this visit on 04/01/19. Lab Results   Component Value Date    CREATININE 1.3 (H) 12/13/2018    BUN 16 12/13/2018     12/13/2018    K 4.2 12/13/2018     12/13/2018    CO2 25 12/13/2018       Lab Results   Component Value Date    PSA 0.90 03/25/2019    PSA 1.16 (H) 12/01/2018    PSA 1.05 (H) 05/01/2018       Cystoscopy  After obtaining informed consent and prepping the urethral meatus, a 16-V[place]an flexible cystoscope was passed per urethra into the bladder. The urethra was evaluated on the way in and then again on the way out and was found to have a calcified recurrent bladder neck contracture. The prostate was moderately obstructing.   The bladder was unable to be fully evaluated. The scope was removed. The patient tolerated the procedure and there were no complications. A dose of 500 mg ciprofloxacin was given for the procedure. Impression: calcified recurrent bladder neck contracture       Plan:    Schedule TURBN- needs clearance. Schedule follow up back with Kylah Mills. I described the procedure in detail and also described the associated risks and benefits at length. We discussed possible alternative therapies. We discussed the risks and benefits of not undergoing therapy. Baldomerosharona Mcgowan understands these risks and benefits and desires to proceed. He will be scheduled for the procedure in the very near future.

## 2019-04-01 NOTE — TELEPHONE ENCOUNTER
Spoke to Margie Retana regarding the transurethral resection of bladder neck. After medical cleared from PCP. Patient & son, Honey Castleman voiced understanding. Patient prefers afternoon surgery time.

## 2019-04-15 NOTE — TELEPHONE ENCOUNTER
Son came into office asking if patient needs an appointment for pre op .    Please call son with an answer [phone]

## 2019-04-15 NOTE — TELEPHONE ENCOUNTER
Pt does not need an appt for preop clearance, I see where he has labs pending from Urology and I was waiting on those results prior to sending his clearance form to Urology. It is a bmp and cbc. When those are back if normal he will be cleared.

## 2019-04-16 NOTE — TELEPHONE ENCOUNTER
Spoke with Son Loni Olson on [address] [facility] and he states that he will try and get patient in tomorrow to have labs drawn.

## 2019-04-18 ENCOUNTER — TELEPHONE (OUTPATIENT)
Dept: FAMILY MEDICINE CLINIC | Age: 84
End: 2019-04-18

## 2019-04-18 ENCOUNTER — NURSE ONLY (OUTPATIENT)
Dept: LAB | Age: 84
End: 2019-04-18

## 2019-04-18 DIAGNOSIS — I10 ESSENTIAL HYPERTENSION: ICD-10-CM

## 2019-04-18 DIAGNOSIS — Z01.818 PRE-OP TESTING: ICD-10-CM

## 2019-04-18 DIAGNOSIS — N32.0 BLADDER NECK CONTRACTURE: ICD-10-CM

## 2019-04-18 LAB
ANION GAP SERPL CALCULATED.3IONS-SCNC: 13 MEQ/L (ref 8–16)
BASOPHILS # BLD: 0.4 %
BASOPHILS ABSOLUTE: 0 THOU/MM3 (ref 0–0.1)
BUN BLDV-MCNC: 20 MG/DL (ref 7–22)
CALCIUM SERPL-MCNC: 9.5 MG/DL (ref 8.5–10.5)
CHLORIDE BLD-SCNC: 103 MEQ/L (ref 98–111)
CO2: 26 MEQ/L (ref 23–33)
CREAT SERPL-MCNC: 1.2 MG/DL (ref 0.4–1.2)
EOSINOPHIL # BLD: 0.5 %
EOSINOPHILS ABSOLUTE: 0 THOU/MM3 (ref 0–0.4)
ERYTHROCYTE [DISTWIDTH] IN BLOOD BY AUTOMATED COUNT: 12.6 % (ref 11.5–14.5)
ERYTHROCYTE [DISTWIDTH] IN BLOOD BY AUTOMATED COUNT: 44.2 FL (ref 35–45)
GFR SERPL CREATININE-BSD FRML MDRD: 57 ML/MIN/1.73M2
GLUCOSE BLD-MCNC: 105 MG/DL (ref 70–108)
HCT VFR BLD CALC: 46.2 % (ref 42–52)
HEMOGLOBIN: 15.9 GM/DL (ref 14–18)
IMMATURE GRANS (ABS): 0.03 THOU/MM3 (ref 0–0.07)
IMMATURE GRANULOCYTES: 0.4 %
LYMPHOCYTES # BLD: 31.5 %
LYMPHOCYTES ABSOLUTE: 2.4 THOU/MM3 (ref 1–4.8)
MCH RBC QN AUTO: 33.1 PG (ref 26–33)
MCHC RBC AUTO-ENTMCNC: 34.4 GM/DL (ref 32.2–35.5)
MCV RBC AUTO: 96 FL (ref 80–94)
MONOCYTES # BLD: 8.4 %
MONOCYTES ABSOLUTE: 0.6 THOU/MM3 (ref 0.4–1.3)
NUCLEATED RED BLOOD CELLS: 0 /100 WBC
PLATELET # BLD: 273 THOU/MM3 (ref 130–400)
PMV BLD AUTO: 9 FL (ref 9.4–12.4)
POTASSIUM SERPL-SCNC: 4.8 MEQ/L (ref 3.5–5.2)
RBC # BLD: 4.81 MILL/MM3 (ref 4.7–6.1)
SEG NEUTROPHILS: 58.8 %
SEGMENTED NEUTROPHILS ABSOLUTE COUNT: 4.5 THOU/MM3 (ref 1.8–7.7)
SODIUM BLD-SCNC: 142 MEQ/L (ref 135–145)
WBC # BLD: 7.6 THOU/MM3 (ref 4.8–10.8)

## 2019-04-18 NOTE — TELEPHONE ENCOUNTER
Please let pt know I signed his preop cleaance forms and they were faxed to Dr. Bertha Simon. His office should be calling him to schedule the surgery. Yes

## 2019-04-22 ENCOUNTER — TELEPHONE (OUTPATIENT)
Dept: UROLOGY | Age: 84
End: 2019-04-22

## 2019-04-22 NOTE — TELEPHONE ENCOUNTER
Confirmed the medical clearance with patient's son Jean-Claude Petersen.  Confirmed the surgery date with Dr Katiana Walker as 5/2/19. NPO midnight. Arrival time will be announced later. Jean-Claude Petersen voiced understanding.

## 2019-04-22 NOTE — TELEPHONE ENCOUNTER
Please advise on the pre-op antibiotic. Allergy to Penicillins. Scheduled for TurBn with Dr Parra Began on 5/2/19.     Lg Napoles

## 2019-04-22 NOTE — TELEPHONE ENCOUNTER
DO NOT TAKE ASPIRIN, PLAVIX, FISH OIL, GLUCOSAMINE CHONDROITIN, MULTIVITAMINS, VITAMIN A, VITAMIN E, VITAMIN B, COUMADIN, OR MOTRIN-LIKE DRUGS 7 DAYS PRIOR TO SURGERY AND 3 DAYS FOLLOWING     Lopez Orosco 9/21/1926 Diagnosis: Bladder neck contracture    Surgical Physician: Dr. Selena Steele have been scheduled for the procedure marked below:      Surgery: Transurethral resection of bladder neck           Date: 5/2/2019     Anesthesia: Anesthesiologist (General/Spinal)     Place of Service: [address] [place] [address]           SURGERY ARRIVAL TIME WILL BE DETERMINED BY DR. Valerio Soraes AT [address] [facility]. YOU WILL BE CONTACTED WITH THIS INFORMATION. INSTRUCTIONS AS MARKED BELOW:    1. DO NOT eat or drink anything after midnight before surgery. 2. We prefer you shower or bathe with an antibacterial soap (Dial) the morning of surgery. 3.  Please ensure to have a  with you to transport you home. 4.  Please bring a current medication list, photo ID and insurance card(s) with you  5. Okay to take Tylenol  6. If you take Glucophage or Metformin, hold 48-hours prior to surgery  7. Take blood pressure medication as directed, if taken in the morning take with a small sip of water  8. PLEASE BRING THIS LETTER WITH YOU AND SHOW IT TO THE  AT [facility]. 9.  Does patient have a Pace Maker? no  10. Please send a copy to the Family Dr: Alejandra Hansen, OLGA - CNP  11.   Your follow up appointment is scheduled for   5/13/19  At   1:45pm   With  [facility]    Date: 4/22/2019

## 2019-04-25 NOTE — PROGRESS NOTES
In preparation for their surgical procedure above patient was screened for Obstructive Sleep Apnea (TRICIA) using the STOP-Bang Questionnaire by the Pre-Admission Testing department. This is a pre-surgical screening tool for patient safety and serves as a recommendation, this WILL NOT cause cancellation of surgery. STOP-Bang Questionnaire  * Do you currently see a pulmonologist?  No     If yes STOP, do not complete. }.    1.  Do you snore loudly (able to be heard in the next room)? No    2. Do you often feel tired or sleepy during the daytime? No       3. Has anyone ever told you that you stop breathing during your sleep? No    4. Do you have or are you being treated for high blood pressure? Yes      5. BMI more than 35? BMI (Calculated): 25.1        No    6. Age over 48 years? 80 y.o. Yes    7. Neck Circumference greater than 17 inches for male or 16 inches for female? Measured           (visits only)            Not Applicable    8. Gender Male? Yes      TOTAL SCORE: 3    TRICIA - Low Risk : Yes to 0 - 2 questions  TRICIA - Intermediate Risk : Yes to 3 - 4 questions  TRICIA - High Risk : Yes to 5 - 8 questions    Adapted from:   STOP Questionnaire: A Tool to Screen Patients for Obstructive Sleep Apnea   ZENY Newby.C.P.C., Ethel Calderón M.B.B.S., Vanessa Felix M.D., Skyler Whitfield. [facility], Ph.D., FARZANA Juarez.B.B.S., Sherrie Boyle M.Sc., Bernard Shultz M.D., Erika Lagos. MILTON Tarango.P.C.    Anesthesiology 2008; 547:617-37 Copyright 2008, the [address] of Anesthesiologists, [facility].   ----------------------------------------------------------------------------------------------------------------

## 2019-04-25 NOTE — PROGRESS NOTES
NPO after midnight  [employer] and drivers license  Wear comfortable clean clothing  Do not bring jewelry   Shower night before and morning of surgery with a liquid antibacterial soap  Bring medications in original bottles  Follow all instructions given by your physician   needed at discharge  Call [phone] for any questions

## 2019-04-27 NOTE — H&P
History and Physical performed by Dr. Sia Fabian    Mr. Ignacio Olivares was seen in follow up for cystoscopy as per plan developed by OLGA Ac.    Cystoscopy was performed without difficulty and it was well tolerated.     Past Medical History        Past Medical History:   Diagnosis Date    Cancer ([address] 2000     Prostate    Hyperlipidemia      Hypertension      Kidney stone      Thyroid disease              Past Surgical History         Past Surgical History:   Procedure Laterality Date    HERNIA REPAIR        INGUINAL HERNIA REPAIR Right 03/30/2017     with mesh--Dr. Winnie Garcia    OTHER SURGICAL HISTORY Right 2007     Bad sprain    PROSTATE BIOPSY         prostate cancer with radiation    TURP   03/30/2017                   Current Outpatient Medications on File Prior to Visit   Medication Sig Dispense Refill    amLODIPine (NORVASC) 10 MG tablet Take 1 tablet by mouth daily 90 tablet 1    levothyroxine (SYNTHROID) 100 MCG tablet Take 1 tablet by mouth Daily 90 tablet 1    vitamin D (CHOLECALCIFEROL) 1000 UNIT TABS tablet Take 1,000 Units by mouth daily          No current facility-administered medications on file prior to visit.                Allergies   Allergen Reactions    Penicillins         Unsure-hasn't taken in over 36 yrs.         Family History         Family History   Problem Relation Age of Onset    Cancer Mother 80    Stroke Mother      High Blood Pressure Mother      High Blood Pressure Father              Social History               Socioeconomic History    Marital status:         Spouse name: Not on file    Number of children: Not on file    Years of education: Not on file    Highest education level: Not on file   Occupational History    Not on file   Social Needs    Financial resource strain: Not on file    Food insecurity:       Worry: Not on file       Inability: Not on file    Transportation needs:       Medical: Not on file       Non-medical: Not on file Tobacco Use    Smoking status: Never Smoker    Smokeless tobacco: Never Used   Substance and Sexual Activity    Alcohol use: Yes       Comment: Rarely    Drug use: No    Sexual activity: Not on file   Lifestyle    Physical activity:       Days per week: Not on file       Minutes per session: Not on file    Stress: Not on file   Relationships    Social connections:       Talks on phone: Not on file       Gets together: Not on file       Attends Restoration service: Not on file       Active member of club or organization: Not on file       Attends meetings of clubs or organizations: Not on file       Relationship status: Not on file    Intimate partner violence:       Fear of current or ex partner: Not on file       Emotionally abused: Not on file       Physically abused: Not on file       Forced sexual activity: Not on file   Other Topics Concern    Not on file   Social History Narrative    Not on file            Review of Systems  No problems with ears, nose or throat. No problems with eyes. No chest pain, shortness of breath, abdominal pain, extremity pain or weakness, and no neurological deficits. No rashes. No swollen glands or lymph nodes.  symptoms per HPI. The remainder of the review of symptoms is negative.     Exam  General: alert and oriented. Cooperative. HENT: Normocephalic, Atraumatic.   Eyes: No scleral icterus, mucous membranes moist.  Respiratory: Effort normal.   Skin: No rashes or obvious lesions.     Labs     No results found for this visit on 04/01/19.     Lab Results   Component Value Date     CREATININE 1.3 (H) 12/13/2018     BUN 16 12/13/2018      12/13/2018     K 4.2 12/13/2018      12/13/2018     CO2 25 12/13/2018               Lab Results   Component Value Date     PSA 0.90 03/25/2019     PSA 1.16 (H) 12/01/2018     PSA 1.05 (H) 05/01/2018         Cystoscopy  After obtaining informed consent and prepping the urethral meatus, a 16-Sinhala flexible cystoscope was passed per urethra into the bladder. The urethra was evaluated on the way in and then again on the way out and was found to have a calcified recurrent bladder neck contracture. Impression: Calcified recurrent bladder neck contracture         Plan:     Schedule TURBN- needs clearance. Schedule follow up back with Brad.     I described the procedure in detail and also described the associated risks and benefits at length. We discussed possible alternative therapies. We discussed the risks and benefits of not undergoing therapy. Jan Guzman understands these risks and benefits and desires to proceed. He will be scheduled for the procedure in the very near future.

## 2019-04-29 ENCOUNTER — TELEPHONE (OUTPATIENT)
Dept: UROLOGY | Age: 84
End: 2019-04-29

## 2019-04-29 NOTE — TELEPHONE ENCOUNTER
Contacted patient's son, Roxanne Rain, regarding rescheduling the surgery with Dr Ernesto Otoole on 5/2/2019. Offered 4/30/19. Roxanne Rain declined due to his work schedule. Roxanne Rain accepted 5/8/19. His preference is for a PM surgery. Roxanne Briseyda advised he will be notified of the arrival time on a later date. Messaged [facility] [place]. responsible for arranging the cases for Dr Ernesto Otoole, that the preference for the surgery is the afternoon. Spoke to Justin Adams in surgery to change the date.

## 2019-05-02 ENCOUNTER — TELEPHONE (OUTPATIENT)
Dept: UROLOGY | Age: 84
End: 2019-05-02

## 2019-05-08 ENCOUNTER — HOSPITAL ENCOUNTER (INPATIENT)
Age: 84
LOS: 1 days | Discharge: HOME | DRG: 666 | End: 2019-05-12
Attending: UROLOGY | Admitting: UROLOGY
Payer: MEDICARE

## 2019-05-08 ENCOUNTER — ANESTHESIA EVENT (OUTPATIENT)
Dept: OPERATING ROOM | Age: 84
DRG: 666 | End: 2019-05-08
Payer: MEDICARE

## 2019-05-08 ENCOUNTER — ANESTHESIA (OUTPATIENT)
Dept: OPERATING ROOM | Age: 84
DRG: 666 | End: 2019-05-08
Payer: MEDICARE

## 2019-05-08 VITALS
DIASTOLIC BLOOD PRESSURE: 75 MMHG | TEMPERATURE: 95.9 F | RESPIRATION RATE: 3 BRPM | OXYGEN SATURATION: 96 % | SYSTOLIC BLOOD PRESSURE: 154 MMHG

## 2019-05-08 DIAGNOSIS — N17.9 AKI (ACUTE KIDNEY INJURY) (HCC): ICD-10-CM

## 2019-05-08 DIAGNOSIS — N32.0 BLADDER OUTFLOW OBSTRUCTION: Primary | ICD-10-CM

## 2019-05-08 PROBLEM — R31.0 GROSS HEMATURIA: Status: ACTIVE | Noted: 2019-05-08

## 2019-05-08 PROCEDURE — 2709999900 HC NON-CHARGEABLE SUPPLY: Performed by: UROLOGY

## 2019-05-08 PROCEDURE — 99999 PR OFFICE/OUTPT VISIT,PROCEDURE ONLY: CPT | Performed by: UROLOGY

## 2019-05-08 PROCEDURE — 6360000002 HC RX W HCPCS

## 2019-05-08 PROCEDURE — 7100000001 HC PACU RECOVERY - ADDTL 15 MIN: Performed by: UROLOGY

## 2019-05-08 PROCEDURE — 0VB08ZZ EXCISION OF PROSTATE, VIA NATURAL OR ARTIFICIAL OPENING ENDOSCOPIC: ICD-10-PCS | Performed by: UROLOGY

## 2019-05-08 PROCEDURE — 3700000000 HC ANESTHESIA ATTENDED CARE: Performed by: UROLOGY

## 2019-05-08 PROCEDURE — 2780000010 HC IMPLANT OTHER: Performed by: UROLOGY

## 2019-05-08 PROCEDURE — 3700000001 HC ADD 15 MINUTES (ANESTHESIA): Performed by: UROLOGY

## 2019-05-08 PROCEDURE — 0TCB8ZZ EXTIRPATION OF MATTER FROM BLADDER, VIA NATURAL OR ARTIFICIAL OPENING ENDOSCOPIC: ICD-10-PCS | Performed by: UROLOGY

## 2019-05-08 PROCEDURE — 88305 TISSUE EXAM BY PATHOLOGIST: CPT

## 2019-05-08 PROCEDURE — 52310 CYSTOSCOPY AND TREATMENT: CPT | Performed by: UROLOGY

## 2019-05-08 PROCEDURE — 3600000013 HC SURGERY LEVEL 3 ADDTL 15MIN: Performed by: UROLOGY

## 2019-05-08 PROCEDURE — 2709999900 HC NON-CHARGEABLE SUPPLY

## 2019-05-08 PROCEDURE — 51715 ENDOSCOPIC INJECTION/IMPLANT: CPT | Performed by: UROLOGY

## 2019-05-08 PROCEDURE — 2720000010 HC SURG SUPPLY STERILE: Performed by: UROLOGY

## 2019-05-08 PROCEDURE — 0TBC8ZZ EXCISION OF BLADDER NECK, VIA NATURAL OR ARTIFICIAL OPENING ENDOSCOPIC: ICD-10-PCS | Performed by: UROLOGY

## 2019-05-08 PROCEDURE — 3600000003 HC SURGERY LEVEL 3 BASE: Performed by: UROLOGY

## 2019-05-08 PROCEDURE — 52500 TRURL RESECTION BLADDER NECK: CPT | Performed by: UROLOGY

## 2019-05-08 PROCEDURE — 6360000002 HC RX W HCPCS: Performed by: NURSE PRACTITIONER

## 2019-05-08 PROCEDURE — 6370000000 HC RX 637 (ALT 250 FOR IP): Performed by: NURSE PRACTITIONER

## 2019-05-08 PROCEDURE — 2580000003 HC RX 258

## 2019-05-08 PROCEDURE — 2500000003 HC RX 250 WO HCPCS

## 2019-05-08 PROCEDURE — G0378 HOSPITAL OBSERVATION PER HR: HCPCS

## 2019-05-08 PROCEDURE — 2580000003 HC RX 258: Performed by: NURSE PRACTITIONER

## 2019-05-08 PROCEDURE — 7100000000 HC PACU RECOVERY - FIRST 15 MIN: Performed by: UROLOGY

## 2019-05-08 DEVICE — GRAFT HUM TISS 100MG UMB CRD AMNIO MEM INJ REGEN THER: Type: IMPLANTABLE DEVICE | Site: BLADDER | Status: FUNCTIONAL

## 2019-05-08 RX ORDER — LEVOTHYROXINE SODIUM 0.1 MG/1
100 TABLET ORAL DAILY
Status: DISCONTINUED | OUTPATIENT
Start: 2019-05-09 | End: 2019-05-12 | Stop reason: HOSPADM

## 2019-05-08 RX ORDER — ONDANSETRON 2 MG/ML
4 INJECTION INTRAMUSCULAR; INTRAVENOUS EVERY 6 HOURS PRN
Status: DISCONTINUED | OUTPATIENT
Start: 2019-05-08 | End: 2019-05-12 | Stop reason: HOSPADM

## 2019-05-08 RX ORDER — ONDANSETRON 2 MG/ML
INJECTION INTRAMUSCULAR; INTRAVENOUS PRN
Status: DISCONTINUED | OUTPATIENT
Start: 2019-05-08 | End: 2019-05-08 | Stop reason: SDUPTHER

## 2019-05-08 RX ORDER — TRAMADOL HYDROCHLORIDE 50 MG/1
50 TABLET ORAL EVERY 6 HOURS PRN
Qty: 12 TABLET | Refills: 0 | Status: SHIPPED | OUTPATIENT
Start: 2019-05-08 | End: 2019-05-11

## 2019-05-08 RX ORDER — SODIUM CHLORIDE 9 MG/ML
INJECTION, SOLUTION INTRAVENOUS CONTINUOUS
Status: DISCONTINUED | OUTPATIENT
Start: 2019-05-08 | End: 2019-05-08

## 2019-05-08 RX ORDER — DEXAMETHASONE SODIUM PHOSPHATE 4 MG/ML
INJECTION, SOLUTION INTRA-ARTICULAR; INTRALESIONAL; INTRAMUSCULAR; INTRAVENOUS; SOFT TISSUE PRN
Status: DISCONTINUED | OUTPATIENT
Start: 2019-05-08 | End: 2019-05-08 | Stop reason: SDUPTHER

## 2019-05-08 RX ORDER — FENTANYL CITRATE 50 UG/ML
50 INJECTION, SOLUTION INTRAMUSCULAR; INTRAVENOUS EVERY 5 MIN PRN
Status: DISCONTINUED | OUTPATIENT
Start: 2019-05-08 | End: 2019-05-08 | Stop reason: HOSPADM

## 2019-05-08 RX ORDER — AMLODIPINE BESYLATE 10 MG/1
10 TABLET ORAL DAILY
Status: DISCONTINUED | OUTPATIENT
Start: 2019-05-08 | End: 2019-05-12 | Stop reason: HOSPADM

## 2019-05-08 RX ORDER — FENTANYL CITRATE 50 UG/ML
INJECTION, SOLUTION INTRAMUSCULAR; INTRAVENOUS PRN
Status: DISCONTINUED | OUTPATIENT
Start: 2019-05-08 | End: 2019-05-08 | Stop reason: SDUPTHER

## 2019-05-08 RX ORDER — CIPROFLOXACIN 2 MG/ML
400 INJECTION, SOLUTION INTRAVENOUS
Status: COMPLETED | OUTPATIENT
Start: 2019-05-08 | End: 2019-05-08

## 2019-05-08 RX ORDER — SULFAMETHOXAZOLE AND TRIMETHOPRIM 800; 160 MG/1; MG/1
1 TABLET ORAL 2 TIMES DAILY
Qty: 14 TABLET | Refills: 0 | Status: SHIPPED | OUTPATIENT
Start: 2019-05-08 | End: 2019-05-15

## 2019-05-08 RX ORDER — MORPHINE SULFATE 2 MG/ML
4 INJECTION, SOLUTION INTRAMUSCULAR; INTRAVENOUS
Status: DISCONTINUED | OUTPATIENT
Start: 2019-05-08 | End: 2019-05-12 | Stop reason: HOSPADM

## 2019-05-08 RX ORDER — HYDROCODONE BITARTRATE AND ACETAMINOPHEN 5; 325 MG/1; MG/1
2 TABLET ORAL EVERY 4 HOURS PRN
Status: DISCONTINUED | OUTPATIENT
Start: 2019-05-08 | End: 2019-05-12 | Stop reason: HOSPADM

## 2019-05-08 RX ORDER — LIDOCAINE HYDROCHLORIDE 10 MG/ML
INJECTION, SOLUTION INFILTRATION; PERINEURAL PRN
Status: DISCONTINUED | OUTPATIENT
Start: 2019-05-08 | End: 2019-05-08 | Stop reason: SDUPTHER

## 2019-05-08 RX ORDER — KETOROLAC TROMETHAMINE 30 MG/ML
INJECTION, SOLUTION INTRAMUSCULAR; INTRAVENOUS PRN
Status: DISCONTINUED | OUTPATIENT
Start: 2019-05-08 | End: 2019-05-08 | Stop reason: SDUPTHER

## 2019-05-08 RX ORDER — SODIUM CHLORIDE 9 MG/ML
INJECTION, SOLUTION INTRAVENOUS CONTINUOUS PRN
Status: DISCONTINUED | OUTPATIENT
Start: 2019-05-08 | End: 2019-05-08 | Stop reason: SDUPTHER

## 2019-05-08 RX ORDER — LABETALOL HYDROCHLORIDE 5 MG/ML
10 INJECTION, SOLUTION INTRAVENOUS EVERY 10 MIN PRN
Status: DISCONTINUED | OUTPATIENT
Start: 2019-05-08 | End: 2019-05-08 | Stop reason: HOSPADM

## 2019-05-08 RX ORDER — MORPHINE SULFATE 2 MG/ML
2 INJECTION, SOLUTION INTRAMUSCULAR; INTRAVENOUS EVERY 5 MIN PRN
Status: DISCONTINUED | OUTPATIENT
Start: 2019-05-08 | End: 2019-05-08 | Stop reason: HOSPADM

## 2019-05-08 RX ORDER — SUCCINYLCHOLINE CHLORIDE 20 MG/ML
INJECTION INTRAMUSCULAR; INTRAVENOUS PRN
Status: DISCONTINUED | OUTPATIENT
Start: 2019-05-08 | End: 2019-05-08 | Stop reason: SDUPTHER

## 2019-05-08 RX ORDER — ACETAMINOPHEN 325 MG/1
650 TABLET ORAL EVERY 4 HOURS PRN
Status: DISCONTINUED | OUTPATIENT
Start: 2019-05-08 | End: 2019-05-12 | Stop reason: HOSPADM

## 2019-05-08 RX ORDER — SODIUM CHLORIDE 9 MG/ML
INJECTION, SOLUTION INTRAVENOUS CONTINUOUS
Status: ACTIVE | OUTPATIENT
Start: 2019-05-08 | End: 2019-05-09

## 2019-05-08 RX ORDER — SODIUM CHLORIDE 0.9 % (FLUSH) 0.9 %
10 SYRINGE (ML) INJECTION PRN
Status: DISCONTINUED | OUTPATIENT
Start: 2019-05-08 | End: 2019-05-12 | Stop reason: HOSPADM

## 2019-05-08 RX ORDER — DOCUSATE SODIUM 100 MG/1
100 CAPSULE, LIQUID FILLED ORAL 2 TIMES DAILY
Status: DISCONTINUED | OUTPATIENT
Start: 2019-05-08 | End: 2019-05-12 | Stop reason: HOSPADM

## 2019-05-08 RX ORDER — MORPHINE SULFATE 2 MG/ML
2 INJECTION, SOLUTION INTRAMUSCULAR; INTRAVENOUS
Status: DISCONTINUED | OUTPATIENT
Start: 2019-05-08 | End: 2019-05-12 | Stop reason: HOSPADM

## 2019-05-08 RX ORDER — SODIUM CHLORIDE 0.9 % (FLUSH) 0.9 %
10 SYRINGE (ML) INJECTION EVERY 12 HOURS SCHEDULED
Status: DISCONTINUED | OUTPATIENT
Start: 2019-05-08 | End: 2019-05-12 | Stop reason: HOSPADM

## 2019-05-08 RX ORDER — HYDROCODONE BITARTRATE AND ACETAMINOPHEN 5; 325 MG/1; MG/1
1 TABLET ORAL EVERY 4 HOURS PRN
Status: DISCONTINUED | OUTPATIENT
Start: 2019-05-08 | End: 2019-05-12 | Stop reason: HOSPADM

## 2019-05-08 RX ADMIN — DEXTROSE MONOHYDRATE 2 G: 50 INJECTION, SOLUTION INTRAVENOUS at 21:12

## 2019-05-08 RX ADMIN — ONDANSETRON HYDROCHLORIDE 4 MG: 4 INJECTION, SOLUTION INTRAMUSCULAR; INTRAVENOUS at 16:09

## 2019-05-08 RX ADMIN — DEXAMETHASONE SODIUM PHOSPHATE 8 MG: 4 INJECTION, SOLUTION INTRAMUSCULAR; INTRAVENOUS at 16:09

## 2019-05-08 RX ADMIN — DOCUSATE SODIUM 100 MG: 100 CAPSULE, LIQUID FILLED ORAL at 20:39

## 2019-05-08 RX ADMIN — AMLODIPINE BESYLATE 10 MG: 10 TABLET ORAL at 20:39

## 2019-05-08 RX ADMIN — SUCCINYLCHOLINE CHLORIDE 100 MG: 20 INJECTION, SOLUTION INTRAMUSCULAR; INTRAVENOUS at 15:56

## 2019-05-08 RX ADMIN — KETOROLAC TROMETHAMINE 15 MG: 30 INJECTION, SOLUTION INTRAMUSCULAR; INTRAVENOUS at 16:24

## 2019-05-08 RX ADMIN — FENTANYL CITRATE 25 MCG: 50 INJECTION INTRAMUSCULAR; INTRAVENOUS at 16:04

## 2019-05-08 RX ADMIN — FENTANYL CITRATE 25 MCG: 50 INJECTION INTRAMUSCULAR; INTRAVENOUS at 16:16

## 2019-05-08 RX ADMIN — PROPOFOL 100 MG: 10 INJECTION, EMULSION INTRAVENOUS at 15:56

## 2019-05-08 RX ADMIN — FENTANYL CITRATE 50 MCG: 50 INJECTION INTRAMUSCULAR; INTRAVENOUS at 15:56

## 2019-05-08 RX ADMIN — SODIUM CHLORIDE: 9 INJECTION, SOLUTION INTRAVENOUS at 14:43

## 2019-05-08 RX ADMIN — LIDOCAINE HYDROCHLORIDE 100 MG: 10 INJECTION, SOLUTION INFILTRATION; PERINEURAL at 15:56

## 2019-05-08 RX ADMIN — CIPROFLOXACIN 400 MG: 2 INJECTION, SOLUTION INTRAVENOUS at 15:54

## 2019-05-08 RX ADMIN — SODIUM CHLORIDE: 9 INJECTION, SOLUTION INTRAVENOUS at 20:39

## 2019-05-08 RX ADMIN — SODIUM CHLORIDE: 9 INJECTION, SOLUTION INTRAVENOUS at 14:44

## 2019-05-08 RX ADMIN — SODIUM CHLORIDE: 9 INJECTION, SOLUTION INTRAVENOUS at 15:52

## 2019-05-08 ASSESSMENT — PULMONARY FUNCTION TESTS
PIF_VALUE: 22
PIF_VALUE: 18
PIF_VALUE: 14
PIF_VALUE: 1
PIF_VALUE: 22
PIF_VALUE: 14
PIF_VALUE: 14
PIF_VALUE: 22
PIF_VALUE: 14
PIF_VALUE: 3
PIF_VALUE: 15
PIF_VALUE: 2
PIF_VALUE: 3
PIF_VALUE: 2
PIF_VALUE: 14
PIF_VALUE: 14
PIF_VALUE: 1
PIF_VALUE: 5
PIF_VALUE: 4
PIF_VALUE: 7
PIF_VALUE: 22
PIF_VALUE: 23
PIF_VALUE: 1
PIF_VALUE: 0
PIF_VALUE: 2
PIF_VALUE: 14
PIF_VALUE: 15
PIF_VALUE: 14
PIF_VALUE: 14
PIF_VALUE: 2
PIF_VALUE: 14
PIF_VALUE: 2
PIF_VALUE: 14
PIF_VALUE: 1
PIF_VALUE: 22
PIF_VALUE: 14
PIF_VALUE: 2
PIF_VALUE: 14
PIF_VALUE: 14
PIF_VALUE: 15
PIF_VALUE: 1
PIF_VALUE: 22
PIF_VALUE: 14
PIF_VALUE: 22
PIF_VALUE: 14
PIF_VALUE: 0
PIF_VALUE: 14

## 2019-05-08 ASSESSMENT — PAIN SCALES - GENERAL
PAINLEVEL_OUTOF10: 3
PAINLEVEL_OUTOF10: 0

## 2019-05-08 NOTE — PROGRESS NOTES
Received in [id number] from PACU per cart. Patient walked to bed with assist of transport team. Alert,oriented. Skin pink, warm, dry. Respirations easy. Lungs clear. Abdomen rounded, soft, diminished bowel sounds. Mirza catheter patent draining dark red urine with clots, CBI infusing at rapid rate; Mirza secured to left leg with traction. Knee high SCDs in place. IV 0.9NS infusing/right hand/pump at 100ml/hour without signs of infiltration (600ml to count). Rails up x 2. Call light within reach. Bed alarm set. Son at bedside.

## 2019-05-08 NOTE — ANESTHESIA POSTPROCEDURE EVALUATION
Department of Anesthesiology  Postprocedure Note    Patient: Eloise Nation  MRN: [id number]  YOB: 1926  Date of evaluation: 5/8/2019  Time:  6:45 PM     Procedure Summary     Date:  05/08/19 Room / Location:  [address] [place] [address] [address] OR    Anesthesia Start:  1552 Anesthesia Stop:  1640    Procedure:  CYSTOSCOPY TRANSURETHRAL RESECTION OF PROSTATE AND BLADDER  NECK (N/A Bladder) Diagnosis:  (BLADDER NECK CONTRACTURE)    Surgeon:  Reyes Canales MD Responsible Provider:  Gurinder Cedeno DO    Anesthesia Type:  general ASA Status:  3          Anesthesia Type: general    Peter Phase I: Peter Score: 10    Peter Phase II:      Last vitals: Reviewed and per EMR flowsheets.        Anesthesia Post Evaluation    Patient location during evaluation: PACU  Patient participation: complete - patient participated  Level of consciousness: awake and alert  Airway patency: patent  Nausea & Vomiting: no nausea and no vomiting  Complications: no  Cardiovascular status: hemodynamically stable  Respiratory status: acceptable  Hydration status: stable

## 2019-05-08 NOTE — ANESTHESIA PRE PROCEDURE
Department of Anesthesiology  Preprocedure Note       Name:  Lisa Gilbert   Age:  80 y.o.  :  1926                                          MRN:  [id number]         Date:  2019      Surgeon: Mandi Potter):  Cayetano Osler, MD    Procedure: CYSTOSCOPY TRANSURETHRAL RESECTION OF BLADDER  NECK (N/A Bladder)    Medications prior to admission:   Prior to Admission medications    Medication Sig Start Date End Date Taking? Authorizing Provider   amLODIPine (NORVASC) 10 MG tablet Take 1 tablet by mouth daily 19  Yes Madalyn Ra, APRN - CNP   levothyroxine (SYNTHROID) 100 MCG tablet Take 1 tablet by mouth Daily 19  Yes Madalyn Ra, APRN - CNP       Current medications:    Current Facility-Administered Medications   Medication Dose Route Frequency Provider Last Rate Last Dose    0.9 % sodium chloride infusion   Intravenous Continuous Anjana Huerta LPN        ciprofloxacin (CIPRO) IVPB 400 mg  400 mg Intravenous 30 Min Pre-Op Anjana Huerta LPN           Allergies: Allergies   Allergen Reactions    Penicillins      Unsure-hasn't taken in over 40 yrs.        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    Prostate [address] [address]  recurrent C61    Essential hypertension I10    Chronic kidney disease, stage IV (severe) (HCC) N18.4    Acute cystitis with hematuria N30.01    Orthostatic hypotension I95.1    VADIM (acute kidney injury) ([address] N17.9    Leukocytosis D72.829    Hypokalemia E87.6    History of prostate cancer Z85.46    Normocytic anemia D64.9    Unilateral inguinal hernia without obstruction or gangrene K40.90    BPH with obstruction/lower urinary tract symptoms N40.1, N13.8    Bladder stones N21.0    Bladder neck contracture N32.0       Past Medical History:        Diagnosis Date    Cancer ([facility] [address]     Prostate    Hyperlipidemia     Hypertension     Kidney stone     Thyroid disease        Past Surgical History: Procedure Laterality Date    HERNIA REPAIR      INGUINAL HERNIA REPAIR Right 03/30/2017    with mesh--Dr. Idolina Cockayne    OTHER SURGICAL HISTORY Right 2007    Bad sprain    PROSTATE BIOPSY      prostate cancer with radiation    TURP  03/30/2017       Social History:    Social History     Tobacco Use    Smoking status: Never Smoker    Smokeless tobacco: Never Used   Substance Use Topics    Alcohol use: Yes     Comment: Rarely                                Counseling given: Not Answered      Vital Signs (Current):   Vitals:    04/25/19 0856 05/08/19 1341 05/08/19 1352   BP:  (!) 140/90    Pulse:  93    Resp:  18    Temp:  98.6 °F (37 °C)    TempSrc:  Temporal    SpO2:  97%    Weight: 185 lb (83.9 kg)  184 lb (83.5 kg)   Height: 6' (1.829 m)  5' 11\" (1.803 m)                                              BP Readings from Last 3 Encounters:   05/08/19 (!) 140/90   04/01/19 128/68   02/12/19 118/70       NPO Status: Time of last liquid consumption: 1000                        Time of last solid consumption: 1000(possible ate donut or cookie)                        Date of last liquid consumption: 05/08/19                        Date of last solid food consumption: 05/08/19    BMI:   Wt Readings from Last 3 Encounters:   05/08/19 184 lb (83.5 kg)   04/01/19 184 lb 9.6 oz (83.7 kg)   02/12/19 186 lb (84.4 kg)     Body mass index is 25.66 kg/m².     CBC:   Lab Results   Component Value Date    WBC 7.6 04/18/2019    RBC 4.81 04/18/2019    HGB 15.9 04/18/2019    HCT 46.2 04/18/2019    MCV 96.0 04/18/2019    RDW 13.1 04/09/2017     04/18/2019       CMP:   Lab Results   Component Value Date     04/18/2019    K 4.8 04/18/2019     04/18/2019    CO2 26 04/18/2019    BUN 20 04/18/2019    CREATININE 1.2 04/18/2019    LABGLOM 57 04/18/2019    GLUCOSE 105 04/18/2019    PROT 7.3 01/15/2017    CALCIUM 9.5 04/18/2019    BILITOT 0.6 01/15/2017    ALKPHOS 89 01/15/2017    AST 15 01/15/2017    ALT 12 01/15/2017

## 2019-05-09 LAB
ANION GAP SERPL CALCULATED.3IONS-SCNC: 13 MEQ/L (ref 8–16)
BUN BLDV-MCNC: 34 MG/DL (ref 7–22)
CALCIUM SERPL-MCNC: 8.7 MG/DL (ref 8.5–10.5)
CHLORIDE BLD-SCNC: 107 MEQ/L (ref 98–111)
CO2: 20 MEQ/L (ref 23–33)
CREAT SERPL-MCNC: 1.9 MG/DL (ref 0.4–1.2)
ERYTHROCYTE [DISTWIDTH] IN BLOOD BY AUTOMATED COUNT: 13.1 % (ref 11.5–14.5)
ERYTHROCYTE [DISTWIDTH] IN BLOOD BY AUTOMATED COUNT: 46.9 FL (ref 35–45)
GFR SERPL CREATININE-BSD FRML MDRD: 33 ML/MIN/1.73M2
GLUCOSE BLD-MCNC: 149 MG/DL (ref 70–108)
HCT VFR BLD CALC: 38.3 % (ref 42–52)
HEMOGLOBIN: 12.9 GM/DL (ref 14–18)
MCH RBC QN AUTO: 33.2 PG (ref 26–33)
MCHC RBC AUTO-ENTMCNC: 33.7 GM/DL (ref 32.2–35.5)
MCV RBC AUTO: 98.7 FL (ref 80–94)
PLATELET # BLD: 243 THOU/MM3 (ref 130–400)
PMV BLD AUTO: 9.2 FL (ref 9.4–12.4)
POTASSIUM SERPL-SCNC: 4.9 MEQ/L (ref 3.5–5.2)
RBC # BLD: 3.88 MILL/MM3 (ref 4.7–6.1)
SODIUM BLD-SCNC: 140 MEQ/L (ref 135–145)
WBC # BLD: 8.7 THOU/MM3 (ref 4.8–10.8)

## 2019-05-09 PROCEDURE — 2580000003 HC RX 258: Performed by: NURSE PRACTITIONER

## 2019-05-09 PROCEDURE — APPNB30 APP NON BILLABLE TIME 0-30 MINS: Performed by: NURSE PRACTITIONER

## 2019-05-09 PROCEDURE — 51700 IRRIGATION OF BLADDER: CPT

## 2019-05-09 PROCEDURE — 2709999900 HC NON-CHARGEABLE SUPPLY

## 2019-05-09 PROCEDURE — 6370000000 HC RX 637 (ALT 250 FOR IP): Performed by: NURSE PRACTITIONER

## 2019-05-09 PROCEDURE — 6360000002 HC RX W HCPCS: Performed by: NURSE PRACTITIONER

## 2019-05-09 PROCEDURE — 99024 POSTOP FOLLOW-UP VISIT: CPT | Performed by: NURSE PRACTITIONER

## 2019-05-09 PROCEDURE — 85027 COMPLETE CBC AUTOMATED: CPT

## 2019-05-09 PROCEDURE — 36415 COLL VENOUS BLD VENIPUNCTURE: CPT

## 2019-05-09 PROCEDURE — G0378 HOSPITAL OBSERVATION PER HR: HCPCS

## 2019-05-09 PROCEDURE — 80048 BASIC METABOLIC PNL TOTAL CA: CPT

## 2019-05-09 RX ORDER — LORAZEPAM 0.5 MG/1
0.5 TABLET ORAL ONCE
Status: COMPLETED | OUTPATIENT
Start: 2019-05-09 | End: 2019-05-09

## 2019-05-09 RX ADMIN — LORAZEPAM 0.5 MG: 0.5 TABLET ORAL at 21:20

## 2019-05-09 RX ADMIN — DOCUSATE SODIUM 100 MG: 100 CAPSULE, LIQUID FILLED ORAL at 21:20

## 2019-05-09 RX ADMIN — LEVOTHYROXINE SODIUM 100 MCG: 100 TABLET ORAL at 05:18

## 2019-05-09 RX ADMIN — DEXTROSE MONOHYDRATE 2 G: 50 INJECTION, SOLUTION INTRAVENOUS at 05:18

## 2019-05-09 RX ADMIN — SODIUM CHLORIDE: 9 INJECTION, SOLUTION INTRAVENOUS at 14:35

## 2019-05-09 RX ADMIN — AMLODIPINE BESYLATE 10 MG: 10 TABLET ORAL at 08:12

## 2019-05-09 RX ADMIN — DOCUSATE SODIUM 100 MG: 100 CAPSULE, LIQUID FILLED ORAL at 08:12

## 2019-05-09 ASSESSMENT — PAIN SCALES - GENERAL: PAINLEVEL_OUTOF10: 0

## 2019-05-09 NOTE — PROGRESS NOTES
Urology Progress Note    Chief Complaint: Calcified recurrent bladder neck contracture    Subjective:     Pt up in chair. Denies complaints. No chest pain, shortness of breath, pain, fever, chills, nausea, vomiting. Tolerating diet. Passing flatus. Mirza with red-tinged urine with cbi at moderate rate. Vitals:  /66   Pulse 86   Temp 97.9 °F (36.6 °C) (Oral)   Resp 16   Ht 5' 11\" (1.803 m)   Wt 184 lb (83.5 kg)   SpO2 98%   BMI 25.66 kg/m²   Temp  Av.5 °F (35.8 °C)  Min: 95.2 °F (35.1 °C)  Max: 98.6 °F (37 °C)    Intake/Output Summary (Last 24 hours) at 2019 1057  Last data filed at 2019 0548  Gross per 24 hour   Intake 1220 ml   Output 3250 ml   Net -2030 ml       Social History     Socioeconomic History    Marital status:       Spouse name: Not on file    Number of children: Not on file    Years of education: Not on file    Highest education level: Not on file   Occupational History    Not on file   Social Needs    Financial resource strain: Not on file    Food insecurity:     Worry: Not on file     Inability: Not on file    Transportation needs:     Medical: Not on file     Non-medical: Not on file   Tobacco Use    Smoking status: Never Smoker    Smokeless tobacco: Never Used   Substance and Sexual Activity    Alcohol use: Yes     Comment: Rarely    Drug use: No    Sexual activity: Not on file   Lifestyle    Physical activity:     Days per week: Not on file     Minutes per session: Not on file    Stress: Not on file   Relationships    Social connections:     Talks on phone: Not on file     Gets together: Not on file     Attends Pentecostalism service: Not on file     Active member of club or organization: Not on file     Attends meetings of clubs or organizations: Not on file     Relationship status: Not on file    Intimate partner violence:     Fear of current or ex partner: Not on file     Emotionally abused: Not on file     Physically abused: Not on file     Forced sexual activity: Not on file   Other Topics Concern    Not on file   Social History Narrative    Not on file     Family History   Problem Relation Age of Onset    Cancer Mother 80    Stroke Mother     High Blood Pressure Mother     High Blood Pressure Father      Allergies   Allergen Reactions    Penicillins      Unsure-hasn't taken in over 40 yrs. Constitutional: Alert and oriented times x3, no acute distress, and cooperative to examination with appropriate mood and affect. HEENT:   Head:         Normocephalic and atraumatic. Mucous membranes are normal.   Eyes:         EOM are normal. No scleral icterus. Nose:    The external appearance of the nose is normal  Ears: The ears appear normal to external inspection. Cardiovascular:       Normal rate, regular rhythm. Pulmonary/Chest:  Normal respiratory rate and rhthym. No use of accessory muscles. Lungs clear bilaterally. Abdominal:          Soft. No tenderness. Active bowel sounds. Genitalia:    Mirza catheter draining red tinged urine without clots with CBI at moderate rate. Musculoskeletal:    Normal range of motion. He exhibits no edema or tenderness of lower extremities. Extremities:    No cyanosis, clubbing, or edema present. Neurological:    Alert and oriented.      Labs:  WBC:    Lab Results   Component Value Date    WBC 8.7 05/09/2019     Hemoglobin/Hematocrit:    Lab Results   Component Value Date    HGB 12.9 05/09/2019    HCT 38.3 05/09/2019     BMP:    Lab Results   Component Value Date     05/09/2019    K 4.9 05/09/2019     05/09/2019    CO2 20 05/09/2019    BUN 34 05/09/2019    LABALBU 3.5 01/15/2017    CREATININE 1.9 05/09/2019    CALCIUM 8.7 05/09/2019    LABGLOM 33 05/09/2019       Impression:  Recurrent bladder neck contracture  BPH  Urinary retention  VADIM  Prostate cancer  Hx of bladder stones  Hx of recurrent UTIs      Plan:    POD # 1 cystoscopy and transurethral resection of the prostate and bladder neck 5/8/19. Pt continues with hematuria whenever staff turns down CBI. Hand irrigate now for clots. Continue CBI. Wean as able. Creatinine up from baseline. Continue IVFs.   Repeat BMP in am.      DANY Vasquez  05/09/19 10:57 AM  Urology

## 2019-05-09 NOTE — CARE COORDINATION
CASE MANAGEMENT OBSERVATION NOTE       5/9/19, 9:03 AM    Selam Beck       Admitted from: Surgery 5/8/2019/ 1325   Location: [facility]59/059-A Reason for admit: Gross hematuria [R31.0]   Admit order signed?: no    Procedure:   5/8 CYSTOSCOPY TRANSURETHRAL RESECTION OF BLADDER  NECK     Pertinent Info/Orders/Treatment Plan: POD #1. CBI with red-tinged urine; hematuria whenever staff turns down rate of CBI. Afebrile. On room air. I&O, SCDs, collazo care, ambulate. IVF, norvasc, prn norco, synthroid. IV ATB completed. CO2 20, BUN 34, Creat 1.9, hgb 12.9. PCP: OLGA Tate - CNP    Discharge Plan: Spoke with [facility]; states his son lives with him. He did not use any DME or have any HH services PTA. He states he drives, cares for himself independently, and has PCP. He states he was mowing his yard the day before surgery. [facility] states he plans to return home with his son at discharge, denies needs, and declines [facility] stating he doesn't need it.

## 2019-05-09 NOTE — PLAN OF CARE
Problem: Falls - Risk of:  Goal: Will remain free from falls  Description  Will remain free from falls  Outcome: Ongoing  Note:   Patient free from falls this shift. Gait steady with 1 assist. Alert and oriented x 4, using call light appropriately. Goal: Absence of physical injury  Description  Absence of physical injury  Outcome: Ongoing     Problem: Pain Control  Goal: Maintain pain level at or below patient's acceptable level (or 5 if patient is unable to determine acceptable level)  Outcome: Ongoing  Flowsheets (Taken 5/9/2019 0015)  Patient's Stated Pain Goal: No pain  Note:   Patient denies pain at this time. Meeting pain goal of 0/10. Problem:   Goal: Adequate urinary output  Outcome: Ongoing  Note:   Mirza in place with cbi running wide open. Urine is bloody/clear. Weaning as tolerated. Mirza care provided with chlorhexidine solution. Goal: No urinary complication  Outcome: Ongoing     Problem: Discharge Planning:  Goal: Patients continuum of care needs are met  Description  Patients continuum of care needs are met  Outcome: Ongoing  Note:   Patient anticipates being discharged to home with son, denies needs at this time. Care plan reviewed with patient. Patient verbalized understanding of the plan of care and contribute to goal setting.

## 2019-05-10 PROBLEM — R41.0 ACUTE DELIRIUM: Status: ACTIVE | Noted: 2019-05-10

## 2019-05-10 LAB
ANION GAP SERPL CALCULATED.3IONS-SCNC: 14 MEQ/L (ref 8–16)
BUN BLDV-MCNC: 54 MG/DL (ref 7–22)
CALCIUM SERPL-MCNC: 9 MG/DL (ref 8.5–10.5)
CHLORIDE BLD-SCNC: 106 MEQ/L (ref 98–111)
CO2: 20 MEQ/L (ref 23–33)
CREAT SERPL-MCNC: 2.2 MG/DL (ref 0.4–1.2)
GFR SERPL CREATININE-BSD FRML MDRD: 28 ML/MIN/1.73M2
GLUCOSE BLD-MCNC: 112 MG/DL (ref 70–108)
HCT VFR BLD CALC: 32.9 % (ref 42–52)
HEMOGLOBIN: 10.9 GM/DL (ref 14–18)
POTASSIUM SERPL-SCNC: 4.3 MEQ/L (ref 3.5–5.2)
SODIUM BLD-SCNC: 140 MEQ/L (ref 135–145)

## 2019-05-10 PROCEDURE — 2709999900 HC NON-CHARGEABLE SUPPLY

## 2019-05-10 PROCEDURE — 80048 BASIC METABOLIC PNL TOTAL CA: CPT

## 2019-05-10 PROCEDURE — 99220 PR INITIAL OBSERVATION CARE/DAY 70 MINUTES: CPT | Performed by: PHYSICIAN ASSISTANT

## 2019-05-10 PROCEDURE — G0378 HOSPITAL OBSERVATION PER HR: HCPCS

## 2019-05-10 PROCEDURE — 51700 IRRIGATION OF BLADDER: CPT

## 2019-05-10 PROCEDURE — 6370000000 HC RX 637 (ALT 250 FOR IP): Performed by: NURSE PRACTITIONER

## 2019-05-10 PROCEDURE — 2580000003 HC RX 258: Performed by: NURSE PRACTITIONER

## 2019-05-10 PROCEDURE — 85014 HEMATOCRIT: CPT

## 2019-05-10 PROCEDURE — 36415 COLL VENOUS BLD VENIPUNCTURE: CPT

## 2019-05-10 PROCEDURE — 85018 HEMOGLOBIN: CPT

## 2019-05-10 PROCEDURE — 99024 POSTOP FOLLOW-UP VISIT: CPT | Performed by: NURSE PRACTITIONER

## 2019-05-10 PROCEDURE — 2580000003 HC RX 258: Performed by: FAMILY MEDICINE

## 2019-05-10 RX ORDER — SODIUM CHLORIDE 9 MG/ML
INJECTION, SOLUTION INTRAVENOUS CONTINUOUS
Status: DISCONTINUED | OUTPATIENT
Start: 2019-05-10 | End: 2019-05-11

## 2019-05-10 RX ADMIN — Medication 10 ML: at 23:12

## 2019-05-10 RX ADMIN — DOCUSATE SODIUM 100 MG: 100 CAPSULE, LIQUID FILLED ORAL at 08:52

## 2019-05-10 RX ADMIN — DOCUSATE SODIUM 100 MG: 100 CAPSULE, LIQUID FILLED ORAL at 20:07

## 2019-05-10 RX ADMIN — LEVOTHYROXINE SODIUM 100 MCG: 100 TABLET ORAL at 06:50

## 2019-05-10 RX ADMIN — SODIUM CHLORIDE: 9 INJECTION, SOLUTION INTRAVENOUS at 23:12

## 2019-05-10 RX ADMIN — AMLODIPINE BESYLATE 10 MG: 10 TABLET ORAL at 08:49

## 2019-05-10 ASSESSMENT — ENCOUNTER SYMPTOMS
GASTROINTESTINAL NEGATIVE: 1
RESPIRATORY NEGATIVE: 1
ALLERGIC/IMMUNOLOGIC NEGATIVE: 1
EYES NEGATIVE: 1

## 2019-05-10 ASSESSMENT — PAIN SCALES - GENERAL
PAINLEVEL_OUTOF10: 0

## 2019-05-10 NOTE — FLOWSHEET NOTE
05/10/19 0208   Provider Notification   Reason for Communication New orders   Provider Name Tracey Escobar   Provider Notification Physician Assistant   Method of Communication Secure Message   Response See orders   Notification Time    pt continues to pull at catheter- order given to consult hospitalist

## 2019-05-10 NOTE — CARE COORDINATION
5/10/19, 1:35 PM      Hali Hall 278 day: 0  Location: -/068-A Reason for admit: Gross hematuria [R31.0]   Procedure:   5/8 CYSTOSCOPY TRANSURETHRAL RESECTION OF BLADDER  NECK   Treatment Plan of Care: POD #2. Became confused overnight. Oriented to name only and talking to \"green men on the wall\". CBI continues - plan to wean as able. Not passing much gate, added miralax,  increase water intake, and ambulate. Afebrile. On room air. Has not required any of his prn pain meds. Norvasc, synthroid. Received 0.5 mg po ativan x1 last evening. BUN 54, Creat 2.2, Hgb 10.9  PCP: OLGA Kim - CNP   %  Discharge Plan: Home with son. Denies needs, declines HH.     5/10/19, 1:38 PM    Anticipated weekend discharge. Discharge plan discussed by  and . Discharge plan reviewed with patient/ family. Patient/ family verbalize understanding of discharge plan and are in agreement with plan. Understanding was demonstrated using the teach back method.

## 2019-05-10 NOTE — FLOWSHEET NOTE
05/10/19 0314   Provider Notification   Reason for Communication Evaluate   Provider Name [facility]   Provider Notification Physician Assistant   Method of Communication Secure Message   Response Waiting for response   Notification Time 2158   consult for acute confusion

## 2019-05-10 NOTE — PROGRESS NOTES
1547Mojonny Smith cnp updated that pt not keep collazo alone and keep pulling at cbi/collazo. And that cbi capped off.    1735: elizabeth david updated again that hospital ist plans on keep ing over night.  Orders received to hand irrigate prn to help keep urine clear

## 2019-05-10 NOTE — FLOWSHEET NOTE
05/09/19 2023   Provider Notification   Reason for Communication New orders   Provider Name [facility]   Provider Notification Physician Assistant   Method of Communication Secure Message   Response See orders   Notification Time 2023   pt agitated/confused, pulling at catheter- can we have something to calm him down.? Order given for 0.5 mg oral ativan

## 2019-05-10 NOTE — PROGRESS NOTES
Urology Progress Note    Chief Complaint: Calcified recurrent bladder neck contracture    Subjective:   Patient is resting in chair, collazo draining blood tinged urine, no clots noted, -flatus, -BM, ambulating with assistance, tolerating regular diet, denies any nausea or vomiting. There are complaints of bladder pain at this time. Patient remains confused, alert to name, not time or place. Denies chest pain and shortness of breath. Negative Homans. Vitals:  BP (!) 172/81   Pulse 97   Temp 96.4 °F (35.8 °C) (Oral)   Resp 16   Ht 5' 11\" (1.803 m)   Wt 184 lb (83.5 kg)   SpO2 96%   BMI 25.66 kg/m²   Temp  Av.9 °F (36.1 °C)  Min: 96.2 °F (35.7 °C)  Max: 98.2 °F (36.8 °C)    Intake/Output Summary (Last 24 hours) at 5/10/2019 0908  Last data filed at 5/10/2019 0201  Gross per 24 hour   Intake 1262.46 ml   Output 4175 ml   Net -2912.54 ml       Social History     Socioeconomic History    Marital status:       Spouse name: Not on file    Number of children: Not on file    Years of education: Not on file    Highest education level: Not on file   Occupational History    Not on file   Social Needs    Financial resource strain: Not on file    Food insecurity:     Worry: Not on file     Inability: Not on file    Transportation needs:     Medical: Not on file     Non-medical: Not on file   Tobacco Use    Smoking status: Never Smoker    Smokeless tobacco: Never Used   Substance and Sexual Activity    Alcohol use: Yes     Comment: Rarely    Drug use: No    Sexual activity: Not on file   Lifestyle    Physical activity:     Days per week: Not on file     Minutes per session: Not on file    Stress: Not on file   Relationships    Social connections:     Talks on phone: Not on file     Gets together: Not on file     Attends Restoration service: Not on file     Active member of club or organization: Not on file     Attends meetings of clubs or organizations: Not on file     Relationship status: Not on file    Intimate partner violence:     Fear of current or ex partner: Not on file     Emotionally abused: Not on file     Physically abused: Not on file     Forced sexual activity: Not on file   Other Topics Concern    Not on file   Social History Narrative    Not on file     Family History   Problem Relation Age of Onset    Cancer Mother 80    Stroke Mother     High Blood Pressure Mother     High Blood Pressure Father      Allergies   Allergen Reactions    Penicillins      Unsure-hasn't taken in over 40 yrs. Constitutional: Alert and oriented times x1 name, not time or place, no acute distress, and cooperative to examination with appropriate mood and affect. HEENT:   Head:         Normocephalic and atraumatic. Mucous membranes are normal.   Eyes:         EOM are normal. No scleral icterus. Nose:    The external appearance of the nose is normal  Ears: The ears appear normal to external inspection. Cardiovascular:       Normal rate, regular rhythm. Pulmonary/Chest:  Normal respiratory rate and rhthym. No use of accessory muscles. Lungs clear bilaterally. Abdominal:          Soft. No tenderness. Active bowel sounds. Genitalia:    Mirza catheter draining blood tinged urine  Musculoskeletal:    Normal range of motion. He exhibits no edema or tenderness of lower extremities. Extremities:    No cyanosis, clubbing, or edema present. Neurological:    Alert and oriented x 1, name only.      Labs:  WBC:    Lab Results   Component Value Date    WBC 8.7 05/09/2019     Hemoglobin/Hematocrit:    Lab Results   Component Value Date    HGB 10.9 05/10/2019    HCT 32.9 05/10/2019     BMP:    Lab Results   Component Value Date     05/10/2019    K 4.3 05/10/2019     05/10/2019    CO2 20 05/10/2019    BUN 54 05/10/2019    LABALBU 3.5 01/15/2017    CREATININE 2.2 05/10/2019    CALCIUM 9.0 05/10/2019    LABGLOM 28 05/10/2019       Impression:  Recurrent bladder neck contracture  BPH  Urinary retention  S/p TURP on 5/8/19  Acute blood loss anemia  VADIM  Prostate cancer  Hx of bladder stones  Hx of recurrent UTIs       Plan:    POD # 2 cystoscopy and transurethral resection of the prostate and bladder neck 5/8/19. EBL was 75 ml. Creatinine up to 2.2, Hospitalist following. Hgb 10.9 this am from 12.9 yesterday. Confusion continues, only oriented to name, talking to \"green men on the wall. \". Wean CBI as able to. Hand irrigate prn for clots and retention. Spoke with son at length regarding post-op care. Not passing much gas. Continue Colace, add Miralax, increase water intake. Encourage ambulation.   Recheck labs in am.    OLGA Raman-CNP  05/10/19 9:08 AM  Urology

## 2019-05-10 NOTE — PLAN OF CARE
Problem: Falls - Risk of:  Goal: Will remain free from falls  Description  Will remain free from falls  5/10/2019 0019 by Surekha Parks RN  Outcome: Ongoing  Note:   Patient free from falls this shift. Gait steady with 1 assist. Patient impulsive and aggitated- no tele sitter available. Bed and chair alarms in use. Problem: Falls - Risk of:  Goal: Absence of physical injury  Description  Absence of physical injury  Outcome: Ongoing     Problem: Pain Control  Goal: Maintain pain level at or below patient's acceptable level (or 5 if patient is unable to determine acceptable level)  5/10/2019 0019 by Surekha Parks RN  Outcome: Ongoing  Flowsheets (Taken 5/9/2019 0803 by Tang Blackburn RN)  Patient's Stated Pain Goal: No pain  Note:   Patient denies pain at this time. Meeting pain goal of 0/10 with no interventions. Problem:   Goal: Adequate urinary output  5/10/2019 0019 by Surekha Parks RN  Outcome: Ongoing  Note:   Mirza in place with cbi running at slow rate. Urine is clear/pink with occasional small clot. Problem:   Goal: No urinary complication  Outcome: Ongoing     Problem: Discharge Planning:  Goal: Patients continuum of care needs are met  Description  Patients continuum of care needs are met  5/10/2019 0019 by Surekha Parks RN  Outcome: Ongoing  Note:   Patient anticipates being discharged to home with son, denies needs at this time. Care plan reviewed with patient. Patient verbalized understanding of the plan of care and contribute to goal setting.

## 2019-05-10 NOTE — CONSULTS
INITIAL CONSULTATION    Chief Complaint  No chief complaint on file. Current Status/[place]  Consulted for acute confusion in a post operative patient. The patient plan to return home to an independent living setting at discharge. The patient started having confusion this evening and he was attempting to remove his continuous bladder irrigation. The patient is not violent. Past Medical History Complicated by  Past Medical History:   Diagnosis Date    Cancer ([facility] [address] 2000    Prostate    Hyperlipidemia     Hypertension     Kidney stone     Thyroid disease        Past Surgical History  Past Surgical History:   Procedure Laterality Date    CYSTOSCOPY N/A 5/8/2019    CYSTOSCOPY TRANSURETHRAL RESECTION OF PROSTATE AND BLADDER  NECK performed by Cayetano Osler, MD at [facility] Right 03/30/2017    with mesh--Dr. Christen Charles    OTHER SURGICAL HISTORY Right 2007    Bad sprain    PROSTATE BIOPSY      prostate cancer with radiation    TURP  03/30/2017       Family History  Family History   Problem Relation Age of Onset    Cancer Mother 80    Stroke Mother     High Blood Pressure Mother     High Blood Pressure Father        Social History  Social History     Socioeconomic History    Marital status:       Spouse name: None    Number of children: None    Years of education: None    Highest education level: None   Occupational History    None   Social Needs    Financial resource strain: None    Food insecurity:     Worry: None     Inability: None    Transportation needs:     Medical: None     Non-medical: None   Tobacco Use    Smoking status: Never Smoker    Smokeless tobacco: Never Used   Substance and Sexual Activity    Alcohol use: Yes     Comment: Rarely    Drug use: No    Sexual activity: None   Lifestyle    Physical activity:     Days per week: None     Minutes per session: None    Stress: None   Relationships    Social connections: HENT: Negative. Eyes: Negative. Respiratory: Negative. Cardiovascular: Negative. Gastrointestinal: Negative. Endocrine: Negative. Genitourinary: Negative. Musculoskeletal: Negative. Skin: Negative. Allergic/Immunologic: Negative. Neurological: Negative. Hematological: Negative. Psychiatric/Behavioral: Positive for agitation. All other systems reviewed and are negative. Physical Exam  Physical Exam   Constitutional: He appears well-developed and well-nourished. HENT:   Head: Normocephalic and atraumatic. Nose: Nose normal.   Mouth/Throat: Oropharynx is clear and moist.   Eyes: Pupils are equal, round, and reactive to light. Conjunctivae and EOM are normal.   Neck: Normal range of motion. Neck supple. Cardiovascular: Normal rate, regular rhythm, normal heart sounds and intact distal pulses. Pulmonary/Chest: Effort normal and breath sounds normal.   Abdominal: Soft. Bowel sounds are normal.   Genitourinary:   Genitourinary Comments: CBI in place     Musculoskeletal: Normal range of motion. Neurological: He is alert. Oriented x 2 - time and person, not oriented to place     Skin: Skin is warm and dry. Capillary refill takes less than 2 seconds. Psychiatric:   Patient is confused   Nursing note and vitals reviewed. Assessment/Plan:  1.  Acute delirium - monitor today's labs, patient safety measures, fall precautions    Vishal Lara PA-C

## 2019-05-11 PROBLEM — Z90.79 S/P TURP: Status: ACTIVE | Noted: 2019-05-11

## 2019-05-11 LAB
ANION GAP SERPL CALCULATED.3IONS-SCNC: 11 MEQ/L (ref 8–16)
BASOPHILS # BLD: 0.2 %
BASOPHILS ABSOLUTE: 0 THOU/MM3 (ref 0–0.1)
BUN BLDV-MCNC: 46 MG/DL (ref 7–22)
CALCIUM SERPL-MCNC: 8.6 MG/DL (ref 8.5–10.5)
CHLORIDE BLD-SCNC: 111 MEQ/L (ref 98–111)
CO2: 21 MEQ/L (ref 23–33)
CREAT SERPL-MCNC: 1.6 MG/DL (ref 0.4–1.2)
EOSINOPHIL # BLD: 0.6 %
EOSINOPHILS ABSOLUTE: 0.1 THOU/MM3 (ref 0–0.4)
ERYTHROCYTE [DISTWIDTH] IN BLOOD BY AUTOMATED COUNT: 13.4 % (ref 11.5–14.5)
ERYTHROCYTE [DISTWIDTH] IN BLOOD BY AUTOMATED COUNT: 48.6 FL (ref 35–45)
GFR SERPL CREATININE-BSD FRML MDRD: 41 ML/MIN/1.73M2
GLUCOSE BLD-MCNC: 95 MG/DL (ref 70–108)
HCT VFR BLD CALC: 29.3 % (ref 42–52)
HEMOGLOBIN: 9.8 GM/DL (ref 14–18)
IMMATURE GRANS (ABS): 0.04 THOU/MM3 (ref 0–0.07)
IMMATURE GRANULOCYTES: 0.4 %
LYMPHOCYTES # BLD: 19.2 %
LYMPHOCYTES ABSOLUTE: 1.8 THOU/MM3 (ref 1–4.8)
MCH RBC QN AUTO: 33.2 PG (ref 26–33)
MCHC RBC AUTO-ENTMCNC: 33.4 GM/DL (ref 32.2–35.5)
MCV RBC AUTO: 99.3 FL (ref 80–94)
MONOCYTES # BLD: 9.3 %
MONOCYTES ABSOLUTE: 0.9 THOU/MM3 (ref 0.4–1.3)
NUCLEATED RED BLOOD CELLS: 0 /100 WBC
PLATELET # BLD: 219 THOU/MM3 (ref 130–400)
PMV BLD AUTO: 9.1 FL (ref 9.4–12.4)
POTASSIUM SERPL-SCNC: 4.1 MEQ/L (ref 3.5–5.2)
RBC # BLD: 2.95 MILL/MM3 (ref 4.7–6.1)
SEG NEUTROPHILS: 70.3 %
SEGMENTED NEUTROPHILS ABSOLUTE COUNT: 6.5 THOU/MM3 (ref 1.8–7.7)
SODIUM BLD-SCNC: 143 MEQ/L (ref 135–145)
WBC # BLD: 9.3 THOU/MM3 (ref 4.8–10.8)

## 2019-05-11 PROCEDURE — 99232 SBSQ HOSP IP/OBS MODERATE 35: CPT | Performed by: FAMILY MEDICINE

## 2019-05-11 PROCEDURE — 2709999900 HC NON-CHARGEABLE SUPPLY

## 2019-05-11 PROCEDURE — 36415 COLL VENOUS BLD VENIPUNCTURE: CPT

## 2019-05-11 PROCEDURE — 1200000000 HC SEMI PRIVATE

## 2019-05-11 PROCEDURE — 85025 COMPLETE CBC W/AUTO DIFF WBC: CPT

## 2019-05-11 PROCEDURE — 99024 POSTOP FOLLOW-UP VISIT: CPT | Performed by: UROLOGY

## 2019-05-11 PROCEDURE — 80048 BASIC METABOLIC PNL TOTAL CA: CPT

## 2019-05-11 PROCEDURE — 6370000000 HC RX 637 (ALT 250 FOR IP): Performed by: NURSE PRACTITIONER

## 2019-05-11 RX ADMIN — DOCUSATE SODIUM 100 MG: 100 CAPSULE, LIQUID FILLED ORAL at 11:05

## 2019-05-11 RX ADMIN — LEVOTHYROXINE SODIUM 100 MCG: 100 TABLET ORAL at 11:05

## 2019-05-11 RX ADMIN — AMLODIPINE BESYLATE 10 MG: 10 TABLET ORAL at 11:04

## 2019-05-11 ASSESSMENT — PAIN SCALES - GENERAL: PAINLEVEL_OUTOF10: 0

## 2019-05-11 NOTE — PROGRESS NOTES
Urology Progress Note      Subjective:   Patient still somewhat confused but comfortable w the collazo in place draining dark red urine in the bag (lighter in the tube). Had some leakage around the catheter but it irrigated well. Objective:   Patient is alert,in no acute distress, and cooperative to examination with appropriate mood and affect. Vitals:  BP (!) 188/85   Pulse 94   Temp 97.9 °F (36.6 °C) (Oral)   Resp 18   Ht 5' 11\" (1.803 m)   Wt 184 lb (83.5 kg)   SpO2 98%   BMI 25.66 kg/m²   Temp  Av.6 °F (36.4 °C)  Min: 96.4 °F (35.8 °C)  Max: 98.6 °F (37 °C)    Intake/Output Summary (Last 24 hours) at 2019 0920  Last data filed at 2019 0839  Gross per 24 hour   Intake 1766.7 ml   Output 1200 ml   Net 566.7 ml       Social History     Socioeconomic History    Marital status:       Spouse name: Not on file    Number of children: Not on file    Years of education: Not on file    Highest education level: Not on file   Occupational History    Not on file   Social Needs    Financial resource strain: Not on file    Food insecurity:     Worry: Not on file     Inability: Not on file    Transportation needs:     Medical: Not on file     Non-medical: Not on file   Tobacco Use    Smoking status: Never Smoker    Smokeless tobacco: Never Used   Substance and Sexual Activity    Alcohol use: Yes     Comment: Rarely    Drug use: No    Sexual activity: Not on file   Lifestyle    Physical activity:     Days per week: Not on file     Minutes per session: Not on file    Stress: Not on file   Relationships    Social connections:     Talks on phone: Not on file     Gets together: Not on file     Attends Druze service: Not on file     Active member of club or organization: Not on file     Attends meetings of clubs or organizations: Not on file     Relationship status: Not on file    Intimate partner violence:     Fear of current or ex partner: Not on file     Emotionally abused: Not on file     Physically abused: Not on file     Forced sexual activity: Not on file   Other Topics Concern    Not on file   Social History Narrative    Not on file     Family History   Problem Relation Age of Onset    Cancer Mother 80    Stroke Mother     High Blood Pressure Mother     High Blood Pressure Father      Allergies   Allergen Reactions    Penicillins      Unsure-hasn't taken in over 40 yrs. Neurological:    Alert  Abdominal:          Soft, non tender, non distended  Genitalia:   No edema   Mirza catheter draining dark red urine  Extremities:    No cyanosis, soft calves bilaterally       Labs:  CBC:   Recent Labs     05/09/19  0436 05/10/19  0516 05/11/19  0538   WBC 8.7  --  9.3   HGB 12.9* 10.9* 9.8*     --  219     BMP:    Recent Labs     05/09/19  0436 05/10/19  0516 05/11/19  0538    140 143   K 4.9 4.3 4.1    106 111   CO2 20* 20* 21*   BUN 34* 54* 46*   CREATININE 1.9* 2.2* 1.6*   GLUCOSE 149* 112* 95   CALCIUM 8.7 9.0 8.6       Impression:  Recurrent bladder neck contracture  BPH  Urinary retention  S/p TURP on 5/8/19  Acute blood loss anemia  VADIM  Prostate cancer  Hx of bladder stones  Hx of recurrent UTIs        Plan:     POD # 3 cystoscopy and transurethral resection of the prostate and bladder neck 5/8/19. EBL was 75 ml.     Creatinine down to 1.6, Hospitalist following. Hgb 9.8 from 10.9, and 12.9. Mirza removed for voiding trial. Will keep observing today. AM labs.     Electronically signed by Jayne Smart MD on 5/11/2019 at 9:20 AM  Urology

## 2019-05-11 NOTE — PLAN OF CARE
Problem: Falls - Risk of:  Goal: Will remain free from falls  Description  Will remain free from falls  5/11/2019 0335 by Luis Miguel Leal RN  Outcome: Ongoing  Note:   Sitter at bedside. Bed alarm set. Patient has remained free from falls this shift. Patient is alert to name only. Bed to lowest position with door open. Patient care items and call light in reach. Patient uses call light appropriately for assist. Will continue to monitor. Please see fall assessment. Problem: Pain Control  Goal: Maintain pain level at or below patient's acceptable level (or 5 if patient is unable to determine acceptable level)  5/11/2019 0335 by Luis Miguel Leal RN  Outcome: Ongoing  Flowsheets (Taken 5/11/2019 0335)  Patient's Stated Pain Goal: No pain  Note:   Pt. Denies pain at this time. Will continue to assess needs. Problem:   Goal: Adequate urinary output  5/11/2019 0335 by Luis Miguel Leal RN  Outcome: Ongoing  Note:   Mirza catheter draining adequate amounts of clear brownish/red urine. >30 ml/hr. I&O's monitored throughout shift. Problem: Discharge Planning:  Goal: Patients continuum of care needs are met  Description  Patients continuum of care needs are met  5/11/2019 0335 by Luis Miguel Leal RN  Outcome: Ongoing  Note:   Pt. Discharge plans are ongoing. Patient informed of and included in their plan of care.

## 2019-05-11 NOTE — PLAN OF CARE
Problem: Falls - Risk of:  Goal: Will remain free from falls  Description  Will remain free from falls  5/11/2019 1643 by Raul Tenorio RN  Outcome: Met This Shift  Note:   Patient is alert & oriented to name, place & situation. Patient has a  in room. Patient is impulsive at times. Bed alarm utilized for additional safety measures. Problem: Falls - Risk of:  Goal: Absence of physical injury  Description  Absence of physical injury  Outcome: Met This Shift     Problem: Pain Control  Goal: Maintain pain level at or below patient's acceptable level (or 5 if patient is unable to determine acceptable level)  5/11/2019 1643 by Raul Tenorio RN  Outcome: Met This Shift  Flowsheets (Taken 5/11/2019 1643)  Patient's Stated Pain Goal: No pain  Note:   Patient denies pain after catheter was removed. 5/11/2019 0335 by Jossue Joe RN  Outcome: Ongoing  Flowsheets (Taken 5/11/2019 0335)  Patient's Stated Pain Goal: No pain  Note:   Pt. Denies pain at this time. Will continue to assess needs. Problem:   Goal: Adequate urinary output  5/11/2019 1643 by Raul Tenorio RN  Outcome: Ongoing  Note:   Patient has voided 50 ml of dark red/brown urine. Patient had 2 large incontinent voids in adult diapers. Urine remains brown/red. No clots noted. Problem: Discharge Planning:  Goal: Patients continuum of care needs are met  Description  Patients continuum of care needs are met  5/11/2019 1643 by Raul Tenorio RN  Outcome: Met This Shift  Note:   Patient will be discharged to home with son, denies a need for home health. Care plan reviewed with patient and son. Patient and son verbalize understanding of the plan of care and contribute to goal setting.

## 2019-05-11 NOTE — PROGRESS NOTES
Hospitalist Progress Note    Patient:  Eloise Nation      Unit/Bed:5E-68/068-A    YOB: 1926    MRN: [id number]       Acct: [de-identified]     PCP: OLGA Morrow CNP    Date of Admission: 5/8/2019    Chief Complaint:   VADIM on CKD; confusion    Hospital Course:  Consulted for acute confusion in a post operative patient. The patient plan to return home to an independent living setting at discharge. The patient started having confusion this evening and he was attempting to remove his continuous bladder irrigation. The patient is not violent.     Patient was also noted to have elevated creatinine at 2.2 with a baseline of 1-1.2    Subjective:   Patient is awake and alert, cooperative. Oriented x 2. No nausea or vomiting. Tolerating PO. Medications:  Reviewed    Infusion Medications    sodium chloride 75 mL/hr at 05/10/19 2312     Scheduled Medications    amLODIPine  10 mg Oral Daily    levothyroxine  100 mcg Oral Daily    sodium chloride flush  10 mL Intravenous 2 times per day    docusate sodium  100 mg Oral BID     PRN Meds: acetaminophen, HYDROcodone 5 mg - acetaminophen **OR** HYDROcodone 5 mg - acetaminophen, morphine **OR** morphine, sodium chloride flush, ondansetron      Intake/Output Summary (Last 24 hours) at 5/11/2019 0753  Last data filed at 5/11/2019 0430  Gross per 24 hour   Intake 1171.6 ml   Output 1200 ml   Net -28.4 ml       Diet:  DIET CARDIAC; Exam:  BP (!) 171/78   Pulse 98   Temp 98.6 °F (37 °C) (Oral)   Resp 17   Ht 5' 11\" (1.803 m)   Wt 184 lb (83.5 kg)   SpO2 96%   BMI 25.66 kg/m²     General appearance: No apparent distress, appears stated age and cooperative. HEENT: Pupils equal, round, and reactive to light. Conjunctivae/corneas clear. Neck: Supple, with full range of motion. No jugular venous distention. Trachea midline. Respiratory:  Normal respiratory effort.  Clear to auscultation, bilaterally without Rales/Wheezes/Rhonchi. Cardiovascular: Regular rate and rhythm with normal S1/S2 without murmurs, rubs or gallops. Abdomen: Soft, non-tender, non-distended with normal bowel sounds. Musculoskeletal: passive and active ROM x 4 extremities. Skin: Skin color, texture, turgor normal.  No rashes or lesions. Neurologic:  Neurovascularly intact without any focal sensory/motor deficits. Cranial nerves: II-XII intact, grossly non-focal.  Psychiatric: Alert and oriented, thought content appropriate, normal insight  Capillary Refill: Brisk,< 3 seconds   Peripheral Pulses: +2 palpable, equal bilaterally       Labs:   Recent Labs     05/09/19  0436 05/10/19  0516 05/11/19  0538   WBC 8.7  --  9.3   HGB 12.9* 10.9* 9.8*   HCT 38.3* 32.9* 29.3*     --  219     Recent Labs     05/09/19  0436 05/10/19  0516 05/11/19  0538    140 143   K 4.9 4.3 4.1    106 111   CO2 20* 20* 21*   BUN 34* 54* 46*   CREATININE 1.9* 2.2* 1.6*   CALCIUM 8.7 9.0 8.6     No results for input(s): AST, ALT, BILIDIR, BILITOT, ALKPHOS in the last 72 hours. No results for input(s): INR in the last 72 hours. No results for input(s): Salome Michael in the last 72 hours.     Urinalysis:      Lab Results   Component Value Date    NITRU NEGATIVE 03/25/2019    WBCUA > 200 03/25/2019    BACTERIA NONE 03/25/2019    RBCUA 5-10 03/25/2019    BLOODU LARGE 03/25/2019    SPECGRAV 1.020 02/13/2017    SPECGRAV 1.013 01/14/2017    GLUCOSEU NEGATIVE 03/25/2019       Radiology:  No orders to display       Diet: DIET CARDIAC;    DVT prophylaxis: [] Lovenox                                 [] SCDs                                 [] SQ Heparin                                 [] Encourage ambulation           [] Already on Anticoagulation     Disposition:    [] Home       [] TCU       [] Rehab       [] Psych       [] SNF       [] [place]       [] Other-    Code Status: Full Code    PT/OT Eval Status:     Assessment/Plan:    Anticipated Discharge in :     ALENA/Iveth Caicedo [address] Problems    Diagnosis Date Noted    Acute delirium [R41.0] 05/10/2019    Gross hematuria [R31.0] 05/08/2019       Acute post-op delirium  monitor labs  patient safety measures  fall precautions, sitter at bedside  Son states that this improves in his usual environment    VADIM on CKD IV  IVF started yesterday  Creatinine improved 2.2 ro 1.6  Encourage increased PO fluids  Avoid nephrotoxic meds like NSAIDs    May go home from medical standpoint  BMP in 1 week             Electronically signed by Melina Muse MD on 5/11/2019 at 7:53 AM

## 2019-05-12 VITALS
DIASTOLIC BLOOD PRESSURE: 75 MMHG | RESPIRATION RATE: 16 BRPM | HEART RATE: 97 BPM | BODY MASS INDEX: 25.15 KG/M2 | TEMPERATURE: 96.4 F | WEIGHT: 179.68 LBS | HEIGHT: 71 IN | SYSTOLIC BLOOD PRESSURE: 122 MMHG | OXYGEN SATURATION: 100 %

## 2019-05-12 LAB
ANION GAP SERPL CALCULATED.3IONS-SCNC: 12 MEQ/L (ref 8–16)
BUN BLDV-MCNC: 34 MG/DL (ref 7–22)
CALCIUM SERPL-MCNC: 8.6 MG/DL (ref 8.5–10.5)
CHLORIDE BLD-SCNC: 109 MEQ/L (ref 98–111)
CO2: 23 MEQ/L (ref 23–33)
CREAT SERPL-MCNC: 1.4 MG/DL (ref 0.4–1.2)
GFR SERPL CREATININE-BSD FRML MDRD: 47 ML/MIN/1.73M2
GLUCOSE BLD-MCNC: 98 MG/DL (ref 70–108)
HCT VFR BLD CALC: 29.8 % (ref 42–52)
HEMOGLOBIN: 9.9 GM/DL (ref 14–18)
POTASSIUM SERPL-SCNC: 3.9 MEQ/L (ref 3.5–5.2)
SODIUM BLD-SCNC: 144 MEQ/L (ref 135–145)

## 2019-05-12 PROCEDURE — 99024 POSTOP FOLLOW-UP VISIT: CPT | Performed by: UROLOGY

## 2019-05-12 PROCEDURE — 80048 BASIC METABOLIC PNL TOTAL CA: CPT

## 2019-05-12 PROCEDURE — 99232 SBSQ HOSP IP/OBS MODERATE 35: CPT | Performed by: FAMILY MEDICINE

## 2019-05-12 PROCEDURE — 85018 HEMOGLOBIN: CPT

## 2019-05-12 PROCEDURE — 36415 COLL VENOUS BLD VENIPUNCTURE: CPT

## 2019-05-12 PROCEDURE — 85014 HEMATOCRIT: CPT

## 2019-05-12 PROCEDURE — 6370000000 HC RX 637 (ALT 250 FOR IP): Performed by: NURSE PRACTITIONER

## 2019-05-12 RX ADMIN — AMLODIPINE BESYLATE 10 MG: 10 TABLET ORAL at 12:32

## 2019-05-12 RX ADMIN — DOCUSATE SODIUM 100 MG: 100 CAPSULE, LIQUID FILLED ORAL at 12:32

## 2019-05-12 RX ADMIN — LEVOTHYROXINE SODIUM 100 MCG: 100 TABLET ORAL at 06:29

## 2019-05-12 ASSESSMENT — PAIN SCALES - GENERAL
PAINLEVEL_OUTOF10: 0
PAINLEVEL_OUTOF10: 0

## 2019-05-12 NOTE — PROGRESS NOTES
Hospitalist Progress Note    Patient:  Issac Hilton      Unit/Bed:5E-68/068-A    YOB: 1926    MRN: [id number]       Acct: [de-identified]     PCP: OLGA Araiza CNP    Date of Admission: 5/8/2019    Chief Complaint:   VADIM on CKD; confusion    Hospital Course:  Consulted for acute confusion in a post operative patient. The patient plan to return home to an independent living setting at discharge. The patient started having confusion this evening and he was attempting to remove his continuous bladder irrigation. The patient is not violent.     Patient was also noted to have elevated creatinine at 2.2 with a baseline of 1-1.2    Subjective:   Patient is awake and alert, oriented and cooperative. No nausea or vomiting. Tolerating PO. Mirza removed by urology      Medications:  Reviewed    Infusion Medications     Scheduled Medications    amLODIPine  10 mg Oral Daily    levothyroxine  100 mcg Oral Daily    sodium chloride flush  10 mL Intravenous 2 times per day    docusate sodium  100 mg Oral BID     PRN Meds: acetaminophen, HYDROcodone 5 mg - acetaminophen **OR** HYDROcodone 5 mg - acetaminophen, morphine **OR** morphine, sodium chloride flush, ondansetron      Intake/Output Summary (Last 24 hours) at 5/12/2019 1240  Last data filed at 5/12/2019 0826  Gross per 24 hour   Intake 795 ml   Output --   Net 795 ml       Diet:  DIET CARDIAC; Exam:  BP (!) 163/72   Pulse 72   Temp 96.4 °F (35.8 °C) (Oral)   Resp 16   Ht 5' 11\" (1.803 m)   Wt 179 lb 10.8 oz (81.5 kg)   SpO2 99%   BMI 25.06 kg/m²     General appearance: No apparent distress, appears stated age and cooperative. HEENT: Pupils equal, round, and reactive to light. Conjunctivae/corneas clear. Neck: Supple, with full range of motion. No jugular venous distention. Trachea midline. Respiratory:  Normal respiratory effort. Clear to auscultation, bilaterally without Rales/Wheezes/Rhonchi.   Cardiovascular: Regular rate and rhythm with normal S1/S2 without murmurs, rubs or gallops. Abdomen: Soft, non-tender, non-distended with normal bowel sounds. Musculoskeletal: passive and active ROM x 4 extremities. Skin: Skin color, texture, turgor normal.  No rashes or lesions. Neurologic:  Neurovascularly intact without any focal sensory/motor deficits. Cranial nerves: II-XII intact, grossly non-focal.  Psychiatric: Alert and oriented, thought content appropriate, normal insight  Capillary Refill: Brisk,< 3 seconds   Peripheral Pulses: +2 palpable, equal bilaterally       Labs:   Recent Labs     05/10/19  0516 05/11/19  0538 05/12/19  0435   WBC  --  9.3  --    HGB 10.9* 9.8* 9.9*   HCT 32.9* 29.3* 29.8*   PLT  --  219  --      Recent Labs     05/10/19  0516 05/11/19  0538 05/12/19  0435    143 144   K 4.3 4.1 3.9    111 109   CO2 20* 21* 23   BUN 54* 46* 34*   CREATININE 2.2* 1.6* 1.4*   CALCIUM 9.0 8.6 8.6     No results for input(s): AST, ALT, BILIDIR, BILITOT, ALKPHOS in the last 72 hours. No results for input(s): INR in the last 72 hours. No results for input(s): Ishan Relic in the last 72 hours.     Urinalysis:      Lab Results   Component Value Date    NITRU NEGATIVE 03/25/2019    WBCUA > 200 03/25/2019    BACTERIA NONE 03/25/2019    RBCUA 5-10 03/25/2019    BLOODU LARGE 03/25/2019    SPECGRAV 1.020 02/13/2017    SPECGRAV 1.013 01/14/2017    GLUCOSEU NEGATIVE 03/25/2019       Radiology:  No orders to display       Diet: DIET CARDIAC;    DVT prophylaxis: [] Lovenox                                 [] SCDs                                 [] SQ Heparin                                 [] Encourage ambulation           [] Already on Anticoagulation     Disposition:    [] Home       [] TCU       [] Rehab       [] Psych       [] SNF       [] [place]       [] Other-    Code Status: Full Code    PT/OT Eval Status:     Assessment/Plan:    Anticipated Discharge in :     Active Hospital Problems    Diagnosis Date Noted    S/P TURP [Z90.79] 05/11/2019    Acute delirium [R41.0] 05/10/2019    Gross hematuria [R31.0] 05/08/2019       Acute post-op delirium  monitor labs  patient safety measures  fall precautions, sitter at bedside  Son states that this improves in his usual environment    VADIM on CKD IV  IVF started yesterday  Creatinine improved 2.2 to 1.4  Encourage increased PO fluids  Avoid nephrotoxic meds like NSAIDs    May go home from medical standpoint  BMP in 1 week             Electronically signed by Medhat Merida MD on 5/12/2019 at 12:40 PM

## 2019-05-12 NOTE — PROGRESS NOTES
1800-Discharge teaching and instructions for diagnosis/procedure of TURP completed with patient using teachback method. AVS reviewed. Patient voiced understanding regarding prescriptions, follow up appointments, and care of self at home. Discharged in a wheelchair to  home with support per family. Pippa-hex foam soap sent home with patient & son.

## 2019-05-12 NOTE — PROGRESS NOTES
Live sitter discontinued @ 07:00AM d/t no longer need. Patient did well throughout the night. Rested well, was cooperative, more oriented and not jumping out of the bed or messing with medical equipment. Resting in bed with 3/4 siderails up and bed alarm on zone 2.

## 2019-05-12 NOTE — PLAN OF CARE
Problem: Falls - Risk of:  Goal: Will remain free from falls  Description  Will remain free from falls  5/12/2019 0206 by Sally Guido RN  Outcome: Met This Shift  Note:   Non-skids, call light, bed alarm & use of live sitter for fall/safety precautions. Patient has sitter because he was messing with medical equipment on prior shifts, disoriented and agitated at times and a high risk for falls as he was very impulsive trying to get out of bed often. So far this shift patient has slept most of the shift and has been calm, cooperative and followed instructions. Sitter remains at bedside. Will re-assess for need continually and intervene accordingly. No falls occurred. Problem: Pain Control  Goal: Maintain pain level at or below patient's acceptable level (or 5 if patient is unable to determine acceptable level)  5/12/2019 0206 by Sally Guido RN  Outcome: Met This Shift  Flowsheets (Taken 5/12/2019 0206)  Patient's Stated Pain Goal: 1  Note:   Patient denies pain. PRN Tylenol, Norco & Morphine available if needed. Repositioning & rest for non-pharm comfort measures. Meeting pain goal of 1/10. Problem:   Goal: Adequate urinary output  5/12/2019 0206 by Sally Guido RN  Outcome: Met This Shift  Note:   Patient incontinent and dribbles urine often, so unable to get accurate I&O, however has been incontinent in depends x 5. Urine still bloody. No clots noted. Problem:   Goal: No urinary complication  Outcome: Ongoing  Note:   Denies difficulty voiding or any urinary symptoms, however noted patient is incontinent and dribbles urine. Urine still bloody, however no clots noted. Problem: Discharge Planning:  Goal: Patients continuum of care needs are met  Description  Patients continuum of care needs are met  5/12/2019 0206 by Sally Guido RN  Outcome: Ongoing  Note:   Plans to return home with son. No additional medical needs identified for D/C.  Possible discharge today pending morning labs and urinary status. Care plan reviewed with patient and son. Patient and son verbalize understanding of the plan of care and contribute to goal setting.

## 2019-05-12 NOTE — DISCHARGE SUMMARY
DISCHARGE SUMMARY NOTE:      Patient Identification  Melissa Moeller is a 80 y.o. male. :  1926  Admit Date:  2019    Discharge date:   No discharge date for patient encounter. Disposition: home    Discharge Diagnoses:   Patient Active Problem List   Diagnosis    Bladder outflow obstruction    Prostate cancer ([facility] [address] [facility]  recurrent    Essential hypertension    Chronic kidney disease, stage IV (severe) (HCC)    Acute cystitis with hematuria    Orthostatic hypotension    VADIM (acute kidney injury) ([facility] [address] [facility]    Leukocytosis    Hypokalemia    History of prostate cancer    Normocytic anemia    Unilateral inguinal hernia without obstruction or gangrene    BPH with obstruction/lower urinary tract symptoms    Bladder stones    Bladder neck contracture    Gross hematuria    Acute delirium    S/P TURP         Consults: Hospitalist    Surgery: cystoscopy and transurethral resection of the prostate and bladder neck 19      Patient Instructions: Activity: no heavy lifting, pushing, pulling for 6 weeks months, no driving for 2 weeks or while on analgesics  Diet: As tolerated  Follow-up with Dr. Ezra Armando team in 1-2 weeks as scheduled. Hospital course :  Diet was advanced and pain was controlled with PO medications. Being discharged tolerating diet, ambulating on own and with adequate pain control with PO medications. Voiding w dribbling. Knows how to [facility], since he was doing it before. No driving till FU appointment.

## 2019-05-13 ENCOUNTER — TELEPHONE (OUTPATIENT)
Dept: FAMILY MEDICINE CLINIC | Age: 84
End: 2019-05-13

## 2019-05-13 NOTE — TELEPHONE ENCOUNTER
[facility] Trans[facility] Initial Follow Up Call    Outreach made within 2 business days of discharge: Yes    Patient: Jorge Escobedo Patient : 1926   MRN: [id number]  Reason for Admission: There are no discharge diagnoses documented for the most recent discharge. Discharge Date: 19       Spoke with: Susanne Romero    Discharge department/facility: [facility]    TCM Interactive Patient Contact:  Was patient able to fill all prescriptions: Yes  Was patient instructed to bring all medications to the follow-up visit:yes  Is patient taking all medications as directed in the discharge summary?  Yes  Does patient understand their discharge instructions: Yes  Does patient have questions or concerns that need addressed prior to 7-14 day follow up office visit: yes -    Scheduled appointment with PCP within 7-14 days    Follow Up  Future Appointments   Date Time Provider Bernadette Duffy   2019  3:00 PM OLGA Hook - [address] [address] [place]   2019  1:15 PM Candido Griffin [facility] [place]

## 2019-05-13 NOTE — TELEPHONE ENCOUNTER
[facility] Transitions Initial Follow Up Call    Outreach made within 2 business days of discharge: Yes    Patient: Jayleen Boyce Patient : 1926   MRN: [id number]  Reason for Admission: There are no discharge diagnoses documented for the most recent discharge. Discharge Date: 19       Spoke with: MARIA EUGENIA Hudson - advised to call back at earliest convenience     Discharge department/facility: [facility]    TCM Interactive Patient Contact:  Was patient able to fill all prescriptions: No: NA  Was patient instructed to bring all medications to the follow-up visit: No: NA  Is patient taking all medications as directed in the discharge summary?  NA  Does patient understand their discharge instructions: No: NA  Does patient have questions or concerns that need addressed prior to 7-14 day follow up office visit: NA    Scheduled appointment with PCP within 7-14 days    Follow Up  Future Appointments   Date Time Provider Bernadette Duffy   2019  3:00 PM Sepideh Hector APRN - [id number] [address] [place] [address]   2019  1:15 PM Candido Pickard [facility] [place] LPN

## 2019-05-14 ENCOUNTER — NURSE ONLY (OUTPATIENT)
Dept: LAB | Age: 84
End: 2019-05-14

## 2019-05-14 DIAGNOSIS — N17.9 AKI (ACUTE KIDNEY INJURY) (HCC): ICD-10-CM

## 2019-05-14 DIAGNOSIS — I10 HYPERTENSION, UNSPECIFIED TYPE: Primary | ICD-10-CM

## 2019-05-14 LAB
ANION GAP SERPL CALCULATED.3IONS-SCNC: 16 MEQ/L (ref 8–16)
BASOPHILS # BLD: 0.3 %
BASOPHILS ABSOLUTE: 0 THOU/MM3 (ref 0–0.1)
BUN BLDV-MCNC: 27 MG/DL (ref 7–22)
CALCIUM SERPL-MCNC: 9.2 MG/DL (ref 8.5–10.5)
CHLORIDE BLD-SCNC: 104 MEQ/L (ref 98–111)
CO2: 21 MEQ/L (ref 23–33)
CREAT SERPL-MCNC: 1.5 MG/DL (ref 0.4–1.2)
EOSINOPHIL # BLD: 1 %
EOSINOPHILS ABSOLUTE: 0.1 THOU/MM3 (ref 0–0.4)
ERYTHROCYTE [DISTWIDTH] IN BLOOD BY AUTOMATED COUNT: 13.2 % (ref 11.5–14.5)
ERYTHROCYTE [DISTWIDTH] IN BLOOD BY AUTOMATED COUNT: 51.5 FL (ref 35–45)
GFR SERPL CREATININE-BSD FRML MDRD: 44 ML/MIN/1.73M2
GLUCOSE BLD-MCNC: 107 MG/DL (ref 70–108)
HCT VFR BLD CALC: 37.2 % (ref 42–52)
HEMOGLOBIN: 11.7 GM/DL (ref 14–18)
IMMATURE GRANS (ABS): 0.04 THOU/MM3 (ref 0–0.07)
IMMATURE GRANULOCYTES: 0.6 %
LYMPHOCYTES # BLD: 28.5 %
LYMPHOCYTES ABSOLUTE: 1.9 THOU/MM3 (ref 1–4.8)
MCH RBC QN AUTO: 33.6 PG (ref 26–33)
MCHC RBC AUTO-ENTMCNC: 31.5 GM/DL (ref 32.2–35.5)
MCV RBC AUTO: 106.9 FL (ref 80–94)
MONOCYTES # BLD: 9 %
MONOCYTES ABSOLUTE: 0.6 THOU/MM3 (ref 0.4–1.3)
NUCLEATED RED BLOOD CELLS: 0 /100 WBC
PLATELET # BLD: 287 THOU/MM3 (ref 130–400)
PMV BLD AUTO: 9.2 FL (ref 9.4–12.4)
POTASSIUM SERPL-SCNC: 4.5 MEQ/L (ref 3.5–5.2)
RBC # BLD: 3.48 MILL/MM3 (ref 4.7–6.1)
SEG NEUTROPHILS: 60.6 %
SEGMENTED NEUTROPHILS ABSOLUTE COUNT: 4.1 THOU/MM3 (ref 1.8–7.7)
SODIUM BLD-SCNC: 141 MEQ/L (ref 135–145)
WBC # BLD: 6.8 THOU/MM3 (ref 4.8–10.8)

## 2019-05-15 NOTE — INTERVAL H&P NOTE
[address] [address]  History and Physical Update    Pt Name: Radha Machado  MRN: [id number]  YOB: 1926  Date of evaluation: 5/15/2019    [] I have examined the patient and reviewed the H&P/Consult and there are no changes to the patient or plans. [x] I have examined the patient and reviewed the H&P/Consult and have noted the following changes: No changes per patient.         Bernardo Pugh MD  Electronically signed 5/15/2019 at 12:57 AM

## 2019-05-16 ENCOUNTER — OFFICE VISIT (OUTPATIENT)
Dept: FAMILY MEDICINE CLINIC | Age: 84
End: 2019-05-16
Payer: MEDICARE

## 2019-05-16 VITALS
DIASTOLIC BLOOD PRESSURE: 58 MMHG | BODY MASS INDEX: 24.81 KG/M2 | HEIGHT: 72 IN | HEART RATE: 83 BPM | TEMPERATURE: 98.1 F | SYSTOLIC BLOOD PRESSURE: 118 MMHG | RESPIRATION RATE: 16 BRPM | WEIGHT: 183.2 LBS

## 2019-05-16 DIAGNOSIS — D62 ANEMIA DUE TO ACUTE BLOOD LOSS: ICD-10-CM

## 2019-05-16 DIAGNOSIS — N18.30 CHRONIC KIDNEY DISEASE, STAGE III (MODERATE) (HCC): ICD-10-CM

## 2019-05-16 DIAGNOSIS — Z09 HOSPITAL DISCHARGE FOLLOW-UP: Primary | ICD-10-CM

## 2019-05-16 PROCEDURE — 99495 TRANSJ CARE MGMT MOD F2F 14D: CPT | Performed by: NURSE PRACTITIONER

## 2019-05-16 PROCEDURE — 1111F DSCHRG MED/CURRENT MED MERGE: CPT | Performed by: NURSE PRACTITIONER

## 2019-05-16 NOTE — PROGRESS NOTES
Visit Information    Have you changed or started any medications since your last visit including any over-the-counter medicines, vitamins, or herbal medicines? no   Are you having any side effects from any of your medications? -  no  Have you stopped taking any of your medications? Is so, why? -  no    Have you seen any other physician or provider since your last visit? No  Have you had any other diagnostic tests since your last visit? No  Have you been seen in the emergency room and/or had an admission to a hospital since we last saw you? Yes - Records Obtained [facility]  Have you had your routine dental cleaning in the past 6 months? no    Have you activated your [employer] account? If not, what are your barriers?  Yes     Patient Care Team:  OLGA Haskins - CNP as PCP - General (Family Nurse Practitioner)    Medical History Review  Deferred to PCP    Health Maintenance   Topic Date Due    Hepatitis B Vaccine (1 of 3 - Risk 3-dose series) 09/21/1945    DTaP/Tdap/Td vaccine (1 - Tdap) 09/21/1945    Shingles Vaccine (1 of 2) 09/21/1976    Pneumococcal 65+ years Vaccine (1 of 2 - PCV13) 09/21/1991    Flu vaccine (Season Ended) 09/01/2019    Potassium monitoring  05/14/2020    Creatinine monitoring  05/14/2020    HPV vaccine  Aged Out

## 2019-05-16 NOTE — PROGRESS NOTES
MCG tablet Take 1 tablet by mouth Daily 90 tablet 1        Medications patient taking as of now reconciled against medications ordered at time of hospital discharge: Yes    Chief Complaint   Patient presents with    Follow-Up from Hospital     surgery with Dr Caprice Diego       History of Present illness - Follow up of Hospital diagnosis(es): anemia, acute renal failure    Inpatient course: Discharge summary reviewed- see chart. Interval history/Current status: Pt presented with bladder outflow obstruction and had a cystoscopy and transurethral resection of the prostate and bladder neck 5/8/19. He developed hematuria postop and developed anemia and had an episode of confusion and was kept for a 4 day stay. His condition improved and he was discharged to home. A comprehensive review of systems was negative except for what was noted in the HPI. Vitals:    05/16/19 1505   BP: (!) 118/58   Pulse: 83   Resp: 16   Temp: 98.1 °F (36.7 °C)   TempSrc: Oral   Weight: 183 lb 3.2 oz (83.1 kg)   Height: 5' 11.5\" (1.816 m)     Body mass index is 25.2 kg/m². Wt Readings from Last 3 Encounters:   05/16/19 183 lb 3.2 oz (83.1 kg)   05/12/19 179 lb 10.8 oz (81.5 kg)   04/01/19 184 lb 9.6 oz (83.7 kg)     BP Readings from Last 3 Encounters:   05/16/19 (!) 118/58   05/12/19 122/75   05/08/19 (!) 154/75        Physical Exam:  Cardiovascular: normal rate, normal S1 and S2, no gallops, intact distal pulses and no carotid bruits  Abdomen: soft, non-tender, non-distended, normal bowel sounds, no masses or organomegaly  Neurologic: gait and coordination normal and speech normal    Assessment/Plan:  1. Hospital discharge follow-up    - CA DISCHARGE MEDS RECONCILED W/ CURRENT OUTPATIENT MED LIST    2. Anemia due to acute blood loss  hgb has increased from 9. on 5/11/19 to 11.7 on 5/14/19  - CA DISCHARGE MEDS RECONCILED W/ CURRENT OUTPATIENT MED LIST    3.  Chronic kidney disease, stage III (moderate) ([facility])        Quality & Risk Score Accuracy    Visit Dx:  N18.3 - Chronic kidney disease, stage III (moderate) ([facility])  Assessment and plan:  Improving based upon symptoms and exam. Continue current treatment plan and follow up at least yearly. Will continue to monitor  Last edited 05/16/19 15:28 EDT by OLGA Jha CNP       Pt and son asked about driving priviledges, I did advise them to follow up with Urology on this as they revoked his driving priviledges at discharge.      Medical Decision Making: moderate complexity

## 2019-05-20 ENCOUNTER — OFFICE VISIT (OUTPATIENT)
Dept: UROLOGY | Age: 84
End: 2019-05-20
Payer: MEDICARE

## 2019-05-20 VITALS
HEIGHT: 71 IN | SYSTOLIC BLOOD PRESSURE: 138 MMHG | DIASTOLIC BLOOD PRESSURE: 60 MMHG | BODY MASS INDEX: 25.48 KG/M2 | WEIGHT: 182 LBS

## 2019-05-20 DIAGNOSIS — C61 PROSTATE CANCER (HCC): ICD-10-CM

## 2019-05-20 DIAGNOSIS — N32.0 BLADDER NECK CONTRACTURE: Primary | ICD-10-CM

## 2019-05-20 DIAGNOSIS — N40.1 BPH WITH OBSTRUCTION/LOWER URINARY TRACT SYMPTOMS: ICD-10-CM

## 2019-05-20 DIAGNOSIS — N13.8 BPH WITH OBSTRUCTION/LOWER URINARY TRACT SYMPTOMS: ICD-10-CM

## 2019-05-20 LAB — POST VOID RESIDUAL (PVR): 0 ML

## 2019-05-20 PROCEDURE — 51798 US URINE CAPACITY MEASURE: CPT | Performed by: NURSE PRACTITIONER

## 2019-05-20 PROCEDURE — 99024 POSTOP FOLLOW-UP VISIT: CPT | Performed by: NURSE PRACTITIONER

## 2019-05-20 NOTE — PROGRESS NOTES
[address] Mercedes [place]en  Dept: [phone]  Loc: [phone]  Visit Date: 5/20/2019      HPI:     Jase Herrera is s/p TURBN and TURP using bipolar loop, removal of bladder stones, injection of Clarix Flow w/  on 05/08/19. Surgical pathology consistent with Ponce [place] score 4+5 = 9, grade group 5 prostate cancer, tumor volume 37%. Doing well since surgery. Has good stream, is able to control urine. Incontinence has mainly resolved, better compared to prior to surgery. PVR is 0 ml today    Recently underwent  TURBN and TURP revision using bipolar loop, cystolitholapaxy large w/  on 02/05/19. Findings: bladder neck opened and prostate re-resected for regrowth, large stones broken and removed. 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 Urothelial mucosa with acute and chronic inflammation and reactive atypia. In the past, he underwent TURP using bipolar loop per Dr. Carla Flores following right inguinal hernia repair per Dr. Cliff Blake. Pathology showed Ponce [place] Leon 5+4=9 prostate cancer with tumor volume of 62%. Hx of prostate cancer and was treated with external beam radiation therapy in 2000. PSA on 1/15/17 was 2.49 and then on 1/31/17 it was 4.73. A bone scan was negative for metastasis. Patient has chosen active surveillance with intermittent androgen deprivation therapy for treatment. Trend of PSA:  1.16 12/2018  1.05 05/2018  1.14 11/13/17  0.99 07/2017  He comes in with his son. Hx is obtained from the patient and the medical record. Current Outpatient Medications   Medication Sig Dispense Refill    amLODIPine (NORVASC) 10 MG tablet Take 1 tablet by mouth daily 90 tablet 1    levothyroxine (SYNTHROID) 100 MCG tablet Take 1 tablet by mouth Daily 90 tablet 1     No current facility-administered medications for this visit.         Past Medical History  Estephanie Peraza  has a past medical history of Cancer ([facility] [address] Hyperlipidemia, Hypertension, Kidney stone, and Thyroid disease. Past Surgical History  The patient  has a past surgical history that includes Prostate biopsy; other surgical history (Right, 2007); hernia repair; Inguinal hernia repair (Right, 03/30/2017); TURP (03/30/2017); and Cystoscopy (N/A, 5/8/2019). Family History  This patient's family history includes Cancer (age of onset: 80) in his mother; High Blood Pressure in his father and mother; Stroke in his mother. Social History  Estephanie Peraza  reports that he has never smoked. He has never used smokeless tobacco. He reports that he drinks alcohol. He reports that he does not use drugs. Subjective:     Review of Systems  No problems with ears, nose or throat. No problems with eyes. No chest pain, shortness of breath, abdominal pain, extremity pain or weakness, and no neurological deficits. No rashes.  symptoms per HPI. The remainder of the review of symptoms is negative. Objective:     PE:   Vitals:    05/20/19 1320   BP: 138/60   Weight: 182 lb (82.6 kg)   Height: 5' 11\" (1.803 m)       Constitutional: Alert and oriented times 3, no acute distress and cooperative to examination with appropriate mood and affect. HENT:   Head:        Normocephalic and atraumatic. Mouth/Throat:         Mucous membranes are normal.   Eyes:         EOM are normal. No scleral icterus. PERRLA. Neck:        Supple, symmetrical, trachea midline  Pulmonary/Chest:      Chest symmetric with normal A/P diameter. Normal respiratory rate and rhthym. No use of accessory muscles. Abdominal:         Soft. No tenderness. Bowel sounds present. Musculoskeletal:         Normal range of motion. No edema or tenderness of lower extremities. Extremities: No cyanosis, clubbing, or edema present. Neurological:        Alert and oriented. No cranial nerve deficit. Odalys Hawk     Psychiatric:        Normal mood and affect. Labs   Urine dip demonstrates   Results for POC orders placed in visit on 05/20/19   poct post void residual   Result Value Ref Range    post void residual 0 ml       Patients recent PSA values are as follows  Lab Results   Component Value Date    PSA 0.90 03/25/2019    PSA 1.16 (H) 12/01/2018    PSA 1.05 (H) 05/01/2018        Recent BUN/Creatinine:  Lab Results   Component Value Date    BUN 27 05/14/2019    CREATININE 1.5 05/14/2019       Radiology  No recent imaging       Assessment & Plan:     Prostate Cancer  Bladder Neck Contracture   Hx Bladder Calculi  Recurrent UTI     Henry Mckeon is s/p TURBN and TURP using bipolar loop, removal of bladder stones, injection of Clarix Flow w/  on 05/08/19. Surgical pathology consistent with Mary Lou score 4+5 = 9, grade group 5 prostate cancer, tumor volume 37%. Pathology discussed with Henry Mckeon and his son. He is doing well. Urinary symptoms are improved, PVR is 0 ml. Reports stream is good, consistent, no split or spraying. Plan to f/u in 6 weeks to assure he empties well. Return in about 6 weeks (around 7/1/2019) for Prostate Cancer, Bladder Neck Contracture .     Maude Mcmanus, APRN  Urology

## 2019-05-31 ENCOUNTER — TELEPHONE (OUTPATIENT)
Dept: UROLOGY | Age: 84
End: 2019-05-31

## 2019-05-31 ENCOUNTER — ANESTHESIA EVENT (OUTPATIENT)
Dept: OPERATING ROOM | Age: 84
DRG: 690 | End: 2019-05-31
Payer: MEDICARE

## 2019-05-31 ENCOUNTER — HOSPITAL ENCOUNTER (INPATIENT)
Age: 84
LOS: 2 days | Discharge: HOME | DRG: 690 | End: 2019-06-03
Attending: UROLOGY | Admitting: UROLOGY
Payer: MEDICARE

## 2019-05-31 ENCOUNTER — ANESTHESIA (OUTPATIENT)
Dept: OPERATING ROOM | Age: 84
DRG: 690 | End: 2019-05-31
Payer: MEDICARE

## 2019-05-31 VITALS
OXYGEN SATURATION: 97 % | TEMPERATURE: 98.6 F | SYSTOLIC BLOOD PRESSURE: 147 MMHG | DIASTOLIC BLOOD PRESSURE: 66 MMHG | RESPIRATION RATE: 1 BRPM

## 2019-05-31 DIAGNOSIS — R31.9 URINARY TRACT INFECTION WITH HEMATURIA, SITE UNSPECIFIED: Primary | ICD-10-CM

## 2019-05-31 DIAGNOSIS — N39.0 URINARY TRACT INFECTION WITH HEMATURIA, SITE UNSPECIFIED: Primary | ICD-10-CM

## 2019-05-31 DIAGNOSIS — R33.9 URINARY RETENTION: ICD-10-CM

## 2019-05-31 LAB
ALBUMIN SERPL-MCNC: 4.6 G/DL (ref 3.5–5.1)
ALP BLD-CCNC: 92 U/L (ref 38–126)
ALT SERPL-CCNC: 8 U/L (ref 11–66)
ANION GAP SERPL CALCULATED.3IONS-SCNC: 16 MEQ/L (ref 8–16)
AST SERPL-CCNC: 15 U/L (ref 5–40)
BACTERIA: ABNORMAL /HPF
BASOPHILS # BLD: 0.3 %
BASOPHILS ABSOLUTE: 0 THOU/MM3 (ref 0–0.1)
BILIRUB SERPL-MCNC: 0.4 MG/DL (ref 0.3–1.2)
BILIRUBIN DIRECT: < 0.2 MG/DL (ref 0–0.3)
BILIRUBIN URINE: NEGATIVE
BLOOD, URINE: ABNORMAL
BUN BLDV-MCNC: 25 MG/DL (ref 7–22)
CALCIUM SERPL-MCNC: 9.6 MG/DL (ref 8.5–10.5)
CASTS UA: ABNORMAL /LPF
CHARACTER, URINE: ABNORMAL
CHLORIDE BLD-SCNC: 104 MEQ/L (ref 98–111)
CO2: 22 MEQ/L (ref 23–33)
COLOR: ABNORMAL
CREAT SERPL-MCNC: 1.4 MG/DL (ref 0.4–1.2)
CRYSTALS, UA: ABNORMAL
EOSINOPHIL # BLD: 0.4 %
EOSINOPHILS ABSOLUTE: 0 THOU/MM3 (ref 0–0.4)
EPITHELIAL CELLS, UA: ABNORMAL /HPF
ERYTHROCYTE [DISTWIDTH] IN BLOOD BY AUTOMATED COUNT: 13.2 % (ref 11.5–14.5)
ERYTHROCYTE [DISTWIDTH] IN BLOOD BY AUTOMATED COUNT: 47.7 FL (ref 35–45)
GFR SERPL CREATININE-BSD FRML MDRD: 47 ML/MIN/1.73M2
GLUCOSE BLD-MCNC: 118 MG/DL (ref 70–108)
GLUCOSE URINE: NEGATIVE MG/DL
HCT VFR BLD CALC: 38.9 % (ref 42–52)
HEMOGLOBIN: 13.1 GM/DL (ref 14–18)
IMMATURE GRANS (ABS): 0.02 THOU/MM3 (ref 0–0.07)
IMMATURE GRANULOCYTES: 0.3 %
KETONES, URINE: NEGATIVE
LEUKOCYTE ESTERASE, URINE: ABNORMAL
LIPASE: 17.1 U/L (ref 5.6–51.3)
LYMPHOCYTES # BLD: 18.5 %
LYMPHOCYTES ABSOLUTE: 1.3 THOU/MM3 (ref 1–4.8)
MCH RBC QN AUTO: 33.2 PG (ref 26–33)
MCHC RBC AUTO-ENTMCNC: 33.7 GM/DL (ref 32.2–35.5)
MCV RBC AUTO: 98.7 FL (ref 80–94)
MISCELLANEOUS 2: ABNORMAL
MONOCYTES # BLD: 8.3 %
MONOCYTES ABSOLUTE: 0.6 THOU/MM3 (ref 0.4–1.3)
NITRITE, URINE: POSITIVE
NUCLEATED RED BLOOD CELLS: 0 /100 WBC
OSMOLALITY CALCULATION: 288.6 MOSMOL/KG (ref 275–300)
PH UA: 7.5 (ref 5–9)
PLATELET # BLD: 307 THOU/MM3 (ref 130–400)
PMV BLD AUTO: 8.5 FL (ref 9.4–12.4)
POTASSIUM SERPL-SCNC: 4.1 MEQ/L (ref 3.5–5.2)
PROTEIN UA: 100
RBC # BLD: 3.94 MILL/MM3 (ref 4.7–6.1)
RBC URINE: > 200 /HPF
RENAL EPITHELIAL, UA: ABNORMAL
SEG NEUTROPHILS: 72.2 %
SEGMENTED NEUTROPHILS ABSOLUTE COUNT: 5 THOU/MM3 (ref 1.8–7.7)
SODIUM BLD-SCNC: 142 MEQ/L (ref 135–145)
SPECIFIC GRAVITY, URINE: 1.02 (ref 1–1.03)
TOTAL PROTEIN: 7.8 G/DL (ref 6.1–8)
UROBILINOGEN, URINE: 0.2 EU/DL (ref 0–1)
WBC # BLD: 6.9 THOU/MM3 (ref 4.8–10.8)
WBC UA: > 100 /HPF

## 2019-05-31 PROCEDURE — 80076 HEPATIC FUNCTION PANEL: CPT

## 2019-05-31 PROCEDURE — G0378 HOSPITAL OBSERVATION PER HR: HCPCS

## 2019-05-31 PROCEDURE — 87086 URINE CULTURE/COLONY COUNT: CPT

## 2019-05-31 PROCEDURE — 2709999900 HC NON-CHARGEABLE SUPPLY: Performed by: UROLOGY

## 2019-05-31 PROCEDURE — 36415 COLL VENOUS BLD VENIPUNCTURE: CPT

## 2019-05-31 PROCEDURE — 2709999900 HC NON-CHARGEABLE SUPPLY

## 2019-05-31 PROCEDURE — 85025 COMPLETE CBC W/AUTO DIFF WBC: CPT

## 2019-05-31 PROCEDURE — 2720000010 HC SURG SUPPLY STERILE: Performed by: UROLOGY

## 2019-05-31 PROCEDURE — 3600000003 HC SURGERY LEVEL 3 BASE: Performed by: UROLOGY

## 2019-05-31 PROCEDURE — C1769 GUIDE WIRE: HCPCS | Performed by: UROLOGY

## 2019-05-31 PROCEDURE — 99024 POSTOP FOLLOW-UP VISIT: CPT | Performed by: NURSE PRACTITIONER

## 2019-05-31 PROCEDURE — 87040 BLOOD CULTURE FOR BACTERIA: CPT

## 2019-05-31 PROCEDURE — 0T9B80Z DRAINAGE OF BLADDER WITH DRAINAGE DEVICE, VIA NATURAL OR ARTIFICIAL OPENING ENDOSCOPIC: ICD-10-PCS | Performed by: UROLOGY

## 2019-05-31 PROCEDURE — 7100000001 HC PACU RECOVERY - ADDTL 15 MIN: Performed by: UROLOGY

## 2019-05-31 PROCEDURE — 6360000002 HC RX W HCPCS: Performed by: NURSE ANESTHETIST, CERTIFIED REGISTERED

## 2019-05-31 PROCEDURE — 2580000003 HC RX 258: Performed by: NURSE PRACTITIONER

## 2019-05-31 PROCEDURE — 6370000000 HC RX 637 (ALT 250 FOR IP): Performed by: PHYSICIAN ASSISTANT

## 2019-05-31 PROCEDURE — 81001 URINALYSIS AUTO W/SCOPE: CPT

## 2019-05-31 PROCEDURE — 87186 SC STD MICRODIL/AGAR DIL: CPT

## 2019-05-31 PROCEDURE — 87147 CULTURE TYPE IMMUNOLOGIC: CPT

## 2019-05-31 PROCEDURE — 83690 ASSAY OF LIPASE: CPT

## 2019-05-31 PROCEDURE — 3600000013 HC SURGERY LEVEL 3 ADDTL 15MIN: Performed by: UROLOGY

## 2019-05-31 PROCEDURE — 6360000002 HC RX W HCPCS: Performed by: UROLOGY

## 2019-05-31 PROCEDURE — 6360000002 HC RX W HCPCS: Performed by: PHYSICIAN ASSISTANT

## 2019-05-31 PROCEDURE — 51702 INSERT TEMP BLADDER CATH: CPT

## 2019-05-31 PROCEDURE — 99285 EMERGENCY DEPT VISIT HI MDM: CPT

## 2019-05-31 PROCEDURE — 80048 BASIC METABOLIC PNL TOTAL CA: CPT

## 2019-05-31 PROCEDURE — 7100000000 HC PACU RECOVERY - FIRST 15 MIN: Performed by: UROLOGY

## 2019-05-31 PROCEDURE — 3700000001 HC ADD 15 MINUTES (ANESTHESIA): Performed by: UROLOGY

## 2019-05-31 PROCEDURE — 87077 CULTURE AEROBIC IDENTIFY: CPT

## 2019-05-31 PROCEDURE — 3700000000 HC ANESTHESIA ATTENDED CARE: Performed by: UROLOGY

## 2019-05-31 RX ORDER — FENTANYL CITRATE 50 UG/ML
25 INJECTION, SOLUTION INTRAMUSCULAR; INTRAVENOUS EVERY 5 MIN PRN
Status: DISCONTINUED | OUTPATIENT
Start: 2019-05-31 | End: 2019-05-31 | Stop reason: HOSPADM

## 2019-05-31 RX ORDER — HYDROCODONE BITARTRATE AND ACETAMINOPHEN 5; 325 MG/1; MG/1
2 TABLET ORAL EVERY 4 HOURS PRN
Status: DISCONTINUED | OUTPATIENT
Start: 2019-05-31 | End: 2019-05-31

## 2019-05-31 RX ORDER — SODIUM CHLORIDE 0.9 % (FLUSH) 0.9 %
10 SYRINGE (ML) INJECTION EVERY 12 HOURS SCHEDULED
Status: DISCONTINUED | OUTPATIENT
Start: 2019-05-31 | End: 2019-06-03 | Stop reason: HOSPADM

## 2019-05-31 RX ORDER — LABETALOL HYDROCHLORIDE 5 MG/ML
5 INJECTION, SOLUTION INTRAVENOUS EVERY 10 MIN PRN
Status: DISCONTINUED | OUTPATIENT
Start: 2019-05-31 | End: 2019-05-31 | Stop reason: HOSPADM

## 2019-05-31 RX ORDER — SODIUM CHLORIDE 9 MG/ML
INJECTION, SOLUTION INTRAVENOUS CONTINUOUS
Status: DISCONTINUED | OUTPATIENT
Start: 2019-05-31 | End: 2019-05-31

## 2019-05-31 RX ORDER — LEVOTHYROXINE SODIUM 0.1 MG/1
100 TABLET ORAL DAILY
Status: DISCONTINUED | OUTPATIENT
Start: 2019-05-31 | End: 2019-06-03 | Stop reason: HOSPADM

## 2019-05-31 RX ORDER — MORPHINE SULFATE 4 MG/ML
4 INJECTION, SOLUTION INTRAMUSCULAR; INTRAVENOUS
Status: DISCONTINUED | OUTPATIENT
Start: 2019-05-31 | End: 2019-05-31

## 2019-05-31 RX ORDER — PROPOFOL 10 MG/ML
INJECTION, EMULSION INTRAVENOUS PRN
Status: DISCONTINUED | OUTPATIENT
Start: 2019-05-31 | End: 2019-05-31 | Stop reason: SDUPTHER

## 2019-05-31 RX ORDER — ATROPA BELLADONNA AND OPIUM 16.2; 3 MG/1; MG/1
30 SUPPOSITORY RECTAL EVERY 8 HOURS PRN
Status: DISCONTINUED | OUTPATIENT
Start: 2019-05-31 | End: 2019-06-03 | Stop reason: HOSPADM

## 2019-05-31 RX ORDER — CIPROFLOXACIN 2 MG/ML
400 INJECTION, SOLUTION INTRAVENOUS ONCE
Status: COMPLETED | OUTPATIENT
Start: 2019-05-31 | End: 2019-05-31

## 2019-05-31 RX ORDER — ONDANSETRON 2 MG/ML
4 INJECTION INTRAMUSCULAR; INTRAVENOUS EVERY 6 HOURS PRN
Status: DISCONTINUED | OUTPATIENT
Start: 2019-05-31 | End: 2019-06-03 | Stop reason: HOSPADM

## 2019-05-31 RX ORDER — MORPHINE SULFATE 2 MG/ML
2 INJECTION, SOLUTION INTRAMUSCULAR; INTRAVENOUS
Status: DISCONTINUED | OUTPATIENT
Start: 2019-05-31 | End: 2019-05-31

## 2019-05-31 RX ORDER — SODIUM CHLORIDE 0.9 % (FLUSH) 0.9 %
10 SYRINGE (ML) INJECTION PRN
Status: DISCONTINUED | OUTPATIENT
Start: 2019-05-31 | End: 2019-06-03 | Stop reason: HOSPADM

## 2019-05-31 RX ORDER — ALPRAZOLAM 0.25 MG/1
0.25 TABLET ORAL NIGHTLY PRN
Status: DISCONTINUED | OUTPATIENT
Start: 2019-05-31 | End: 2019-06-03 | Stop reason: HOSPADM

## 2019-05-31 RX ORDER — SODIUM CHLORIDE 9 MG/ML
INJECTION, SOLUTION INTRAVENOUS CONTINUOUS
Status: DISCONTINUED | OUTPATIENT
Start: 2019-05-31 | End: 2019-06-01

## 2019-05-31 RX ORDER — FENTANYL CITRATE 50 UG/ML
50 INJECTION, SOLUTION INTRAMUSCULAR; INTRAVENOUS EVERY 5 MIN PRN
Status: DISCONTINUED | OUTPATIENT
Start: 2019-05-31 | End: 2019-05-31 | Stop reason: HOSPADM

## 2019-05-31 RX ORDER — ONDANSETRON 2 MG/ML
4 INJECTION INTRAMUSCULAR; INTRAVENOUS
Status: DISCONTINUED | OUTPATIENT
Start: 2019-05-31 | End: 2019-05-31 | Stop reason: HOSPADM

## 2019-05-31 RX ORDER — AMLODIPINE BESYLATE 10 MG/1
10 TABLET ORAL DAILY
Status: DISCONTINUED | OUTPATIENT
Start: 2019-05-31 | End: 2019-06-03 | Stop reason: HOSPADM

## 2019-05-31 RX ORDER — ACETAMINOPHEN 325 MG/1
650 TABLET ORAL ONCE
Status: COMPLETED | OUTPATIENT
Start: 2019-05-31 | End: 2019-05-31

## 2019-05-31 RX ORDER — CIPROFLOXACIN 2 MG/ML
400 INJECTION, SOLUTION INTRAVENOUS EVERY 12 HOURS
Status: DISCONTINUED | OUTPATIENT
Start: 2019-05-31 | End: 2019-06-01

## 2019-05-31 RX ORDER — NITROFURANTOIN 25; 75 MG/1; MG/1
100 CAPSULE ORAL 2 TIMES DAILY
Qty: 20 CAPSULE | Refills: 0 | Status: SHIPPED | OUTPATIENT
Start: 2019-05-31 | End: 2019-06-03 | Stop reason: HOSPADM

## 2019-05-31 RX ORDER — FENTANYL CITRATE 50 UG/ML
INJECTION, SOLUTION INTRAMUSCULAR; INTRAVENOUS PRN
Status: DISCONTINUED | OUTPATIENT
Start: 2019-05-31 | End: 2019-05-31 | Stop reason: SDUPTHER

## 2019-05-31 RX ORDER — HYDROCODONE BITARTRATE AND ACETAMINOPHEN 5; 325 MG/1; MG/1
1 TABLET ORAL EVERY 4 HOURS PRN
Status: DISCONTINUED | OUTPATIENT
Start: 2019-05-31 | End: 2019-05-31

## 2019-05-31 RX ORDER — MORPHINE SULFATE 2 MG/ML
2 INJECTION, SOLUTION INTRAMUSCULAR; INTRAVENOUS ONCE
Status: COMPLETED | OUTPATIENT
Start: 2019-05-31 | End: 2019-05-31

## 2019-05-31 RX ORDER — ACETAMINOPHEN 325 MG/1
650 TABLET ORAL EVERY 4 HOURS PRN
Status: DISCONTINUED | OUTPATIENT
Start: 2019-05-31 | End: 2019-06-03 | Stop reason: HOSPADM

## 2019-05-31 RX ORDER — POLYETHYLENE GLYCOL 3350 17 G/17G
17 POWDER, FOR SOLUTION ORAL DAILY PRN
Status: DISCONTINUED | OUTPATIENT
Start: 2019-05-31 | End: 2019-06-03 | Stop reason: HOSPADM

## 2019-05-31 RX ADMIN — FENTANYL CITRATE 50 MCG: 50 INJECTION INTRAMUSCULAR; INTRAVENOUS at 16:43

## 2019-05-31 RX ADMIN — SODIUM CHLORIDE: 9 INJECTION, SOLUTION INTRAVENOUS at 16:31

## 2019-05-31 RX ADMIN — FENTANYL CITRATE 25 MCG: 50 INJECTION INTRAMUSCULAR; INTRAVENOUS at 16:08

## 2019-05-31 RX ADMIN — PROPOFOL 140 MG: 10 INJECTION, EMULSION INTRAVENOUS at 15:35

## 2019-05-31 RX ADMIN — CIPROFLOXACIN 400 MG: 2 INJECTION, SOLUTION INTRAVENOUS at 11:33

## 2019-05-31 RX ADMIN — MORPHINE SULFATE 2 MG: 2 INJECTION, SOLUTION INTRAMUSCULAR; INTRAVENOUS at 12:56

## 2019-05-31 RX ADMIN — ACETAMINOPHEN 650 MG: 325 TABLET ORAL at 09:12

## 2019-05-31 RX ADMIN — FENTANYL CITRATE 25 MCG: 50 INJECTION INTRAMUSCULAR; INTRAVENOUS at 15:50

## 2019-05-31 RX ADMIN — SODIUM CHLORIDE: 9 INJECTION, SOLUTION INTRAVENOUS at 15:06

## 2019-05-31 RX ADMIN — CIPROFLOXACIN 400 MG: 2 INJECTION, SOLUTION INTRAVENOUS at 23:01

## 2019-05-31 ASSESSMENT — PULMONARY FUNCTION TESTS
PIF_VALUE: 13
PIF_VALUE: 9
PIF_VALUE: 9
PIF_VALUE: 3
PIF_VALUE: 3
PIF_VALUE: 7
PIF_VALUE: 3
PIF_VALUE: 0
PIF_VALUE: 3
PIF_VALUE: 3
PIF_VALUE: 2
PIF_VALUE: 3
PIF_VALUE: 1
PIF_VALUE: 10
PIF_VALUE: 3
PIF_VALUE: 8
PIF_VALUE: 3
PIF_VALUE: 3
PIF_VALUE: 1
PIF_VALUE: 3
PIF_VALUE: 2
PIF_VALUE: 13
PIF_VALUE: 3
PIF_VALUE: 3
PIF_VALUE: 2
PIF_VALUE: 3
PIF_VALUE: 5
PIF_VALUE: 3
PIF_VALUE: 0
PIF_VALUE: 3
PIF_VALUE: 5
PIF_VALUE: 3
PIF_VALUE: 3
PIF_VALUE: 0
PIF_VALUE: 3
PIF_VALUE: 2
PIF_VALUE: 0
PIF_VALUE: 2
PIF_VALUE: 2
PIF_VALUE: 0
PIF_VALUE: 3
PIF_VALUE: 2
PIF_VALUE: 3
PIF_VALUE: 3
PIF_VALUE: 0
PIF_VALUE: 3
PIF_VALUE: 26
PIF_VALUE: 3
PIF_VALUE: 1
PIF_VALUE: 8

## 2019-05-31 ASSESSMENT — ENCOUNTER SYMPTOMS
ABDOMINAL PAIN: 1
VOMITING: 0
EYE REDNESS: 0
CONSTIPATION: 0
WHEEZING: 0
RHINORRHEA: 0
SORE THROAT: 0
COLOR CHANGE: 0
NAUSEA: 0
COUGH: 0
SHORTNESS OF BREATH: 0
EYE DISCHARGE: 0
DIARRHEA: 0
BACK PAIN: 0

## 2019-05-31 ASSESSMENT — PAIN DESCRIPTION - LOCATION
LOCATION: OTHER (COMMENT)
LOCATION: OTHER (COMMENT)

## 2019-05-31 ASSESSMENT — PAIN DESCRIPTION - FREQUENCY
FREQUENCY: CONTINUOUS
FREQUENCY: INTERMITTENT
FREQUENCY: CONTINUOUS

## 2019-05-31 ASSESSMENT — PAIN DESCRIPTION - ORIENTATION
ORIENTATION: LOWER
ORIENTATION: LOWER

## 2019-05-31 ASSESSMENT — PAIN DESCRIPTION - DESCRIPTORS
DESCRIPTORS: ACHING
DESCRIPTORS: ACHING

## 2019-05-31 ASSESSMENT — PAIN SCALES - GENERAL
PAINLEVEL_OUTOF10: 8
PAINLEVEL_OUTOF10: 6
PAINLEVEL_OUTOF10: 4
PAINLEVEL_OUTOF10: 8
PAINLEVEL_OUTOF10: 0

## 2019-05-31 ASSESSMENT — PAIN DESCRIPTION - PROGRESSION: CLINICAL_PROGRESSION: GRADUALLY IMPROVING

## 2019-05-31 ASSESSMENT — PAIN DESCRIPTION - PAIN TYPE
TYPE: ACUTE PAIN
TYPE: ACUTE PAIN

## 2019-05-31 NOTE — ED TRIAGE NOTES
Patient presents to ER with complaints of bladder pain that started last night. Patient reports he had surgery on his bladder 2 weeks ago by Dr. Brian Jaquez. Patient reports he has not urinated since last night.

## 2019-05-31 NOTE — TELEPHONE ENCOUNTER
Please schedule Divina Davila for fill and spill Monday or Tuesday. He was in the ER with UTI and retention.     Thanks  Philip Anne

## 2019-05-31 NOTE — ANESTHESIA PRE PROCEDURE
Department of Anesthesiology  Preprocedure Note       Name:  Lyubov Young   Age:  80 y.o.  :  1926                                          MRN:  [id number]         Date:  2019      Surgeon: Randy Kennedy):  Olivia Menendez MD    Procedure: Troy Canter OF CLOTS, FULGERATION OF BLEEDERS (N/A )    Medications prior to admission:   Prior to Admission medications    Medication Sig Start Date End Date Taking? Authorizing Provider   nitrofurantoin, macrocrystal-monohydrate, (MACROBID) 100 MG capsule Take 1 capsule by mouth 2 times daily for 10 days 5/31/19 6/10/19  Matt Best PA-C   amLODIPine (NORVASC) 10 MG tablet Take 1 tablet by mouth daily 19   Harpreet Listen, APRN - CNP   levothyroxine (SYNTHROID) 100 MCG tablet Take 1 tablet by mouth Daily 19   Harpreet Listen, APRN - CNP       Current medications:    No current facility-administered medications for this visit.       Current Outpatient Medications   Medication Sig Dispense Refill    nitrofurantoin, macrocrystal-monohydrate, (MACROBID) 100 MG capsule Take 1 capsule by mouth 2 times daily for 10 days 20 capsule 0     Facility-Administered Medications Ordered in Other Visits   Medication Dose Route Frequency Provider Last Rate Last Dose    0.9 % sodium chloride infusion   Intravenous Continuous Quinten Bolus, APRN -  mL/hr at 19 1506      sodium chloride flush 0.9 % injection 10 mL  10 mL Intravenous 2 times per day Quinten Bolus, APRN - CNP        sodium chloride flush 0.9 % injection 10 mL  10 mL Intravenous PRN Quinten Bolus, APRN - CNP        acetaminophen (TYLENOL) tablet 650 mg  650 mg Oral Q4H PRN Quinten Bolus, APRN - CNP        magnesium hydroxide (MILK OF MAGNESIA) 400 MG/5ML suspension 30 mL  30 mL Oral Daily PRN Quinten Bolus, APRN - CNP        [facility]ron [facility]) injection 4 mg  4 mg Intravenous Q6H PRN Quinten Bolus, APRN - CNP        ciprofloxacin (CIPRO) IVPB 400 mg 400 mg Intravenous Q12H Lolly Bowen, APRN - CNP        HYDROcodone-acetaminophen (NORCO) 5-325 MG per tablet 1 tablet  1 tablet Oral Q4H PRN Lolly Jaffeer, APRN - CNP        Or    HYDROcodone-acetaminophen (NORCO) 5-325 MG per tablet 2 tablet  2 tablet Oral Q4H PRN Lolly Alannaher, APRN - CNP        morphine (PF) injection 2 mg  2 mg Intravenous Q2H PRN Lolly Jaffeer, APRN - CNP        Or    morphine injection 4 mg  4 mg Intravenous Q2H PRN Lolly Jaffeer, APRN - CNP        polyethylene glycol (GLYCOLAX) packet 17 g  17 g Oral Daily PRN Lolly Bowen, APRN - CNP           Allergies: Allergies   Allergen Reactions    Penicillins      Unsure-hasn't taken in over 40 yrs.        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    [facility]  recurrent C61    Essential hypertension I10    Chronic kidney disease, stage IV (severe) (HCC) N18.4    Acute cystitis with hematuria N30.01    Orthostatic hypotension I95.1    VADIM (acute kidney injury) ([facility] N17.9    Leukocytosis D72.829    Hypokalemia E87.6    History of prostate cancer Z85.46    Normocytic anemia D64.9    Unilateral inguinal hernia without obstruction or gangrene K40.90    BPH with obstruction/lower urinary tract symptoms N40.1, N13.8    Bladder stones N21.0    Bladder neck contracture N32.0    Gross hematuria R31.0    Acute delirium R41.0    S/P TURP Z90.79       Past Medical History:        Diagnosis Date    Cancer ([facility] 2000    Prostate    Hyperlipidemia     Hypertension     Kidney stone     Thyroid disease        Past Surgical History:        Procedure Laterality Date    CYSTOSCOPY N/A 5/8/2019    CYSTOSCOPY TRANSURETHRAL RESECTION OF PROSTATE AND BLADDER  NECK performed by Helen Rahman MD [facility] Right 03/30/2017    with mesh--Dr. Rosa Truong    OTHER SURGICAL HISTORY Right 2007 Bad sprain    PROSTATE BIOPSY      prostate cancer with radiation    TURP  03/30/2017       Social History:    Social History     Tobacco Use    Smoking status: Never Smoker    Smokeless tobacco: Never Used   Substance Use Topics    Alcohol use: Yes     Comment: Rarely                                Counseling given: Not Answered      Vital Signs (Current): There were no vitals filed for this visit. BP Readings from Last 3 Encounters:   05/31/19 (!) 158/79   05/20/19 138/60   05/16/19 (!) 118/58       NPO Status:                                                                                 BMI:   Wt Readings from Last 3 Encounters:   05/31/19 175 lb (79.4 kg)   05/20/19 182 lb (82.6 kg)   05/16/19 183 lb 3.2 oz (83.1 kg)     There is no height or weight on file to calculate BMI.    CBC:   Lab Results   Component Value Date    WBC 6.9 05/31/2019    RBC 3.94 05/31/2019    HGB 13.1 05/31/2019    HCT 38.9 05/31/2019    MCV 98.7 05/31/2019    RDW 13.1 04/09/2017     05/31/2019       CMP:   Lab Results   Component Value Date     05/31/2019    K 4.1 05/31/2019     05/31/2019    CO2 22 05/31/2019    BUN 25 05/31/2019    CREATININE 1.4 05/31/2019    LABGLOM 47 05/31/2019    GLUCOSE 118 05/31/2019    PROT 7.8 05/31/2019    CALCIUM 9.6 05/31/2019    BILITOT 0.4 05/31/2019    ALKPHOS 92 05/31/2019    AST 15 05/31/2019    ALT 8 05/31/2019       POC Tests: No results for input(s): POCGLU, POCNA, POCK, POCCL, POCBUN, POCHEMO, POCHCT in the last 72 hours.     Coags: No results found for: PROTIME, INR, APTT    HCG (If Applicable): No results found for: PREGTESTUR, PREGSERUM, HCG, HCGQUANT     ABGs: No results found for: PHART, PO2ART, XRM8XGX, JYP7QFK, BEART, M2RMDESS     Type & Screen (If Applicable):  Lab Results   Component Value Date    LABRH NEG 01/15/2017       Anesthesia Evaluation    Airway: Mallampati: II  TM distance: >3 FB   Neck ROM: full  Mouth opening: > = 3 FB Dental:          Pulmonary: breath sounds clear to auscultation            Patient did not smoke on day of surgery. Cardiovascular:  Exercise tolerance: good (>4 METS),   (+) hypertension:,         Rhythm: regular                      Neuro/Psych:               GI/Hepatic/Renal:   (+) renal disease:,           Endo/Other:              Pt had no PAT visit       Abdominal:           Vascular: negative vascular ROS. Anesthesia Plan      general     ASA 3     (Possibly ate cocky  around at 10 am  )  Induction: intravenous. MIPS: Postoperative opioids intended and Prophylactic antiemetics administered. Anesthetic plan and risks discussed with patient. Plan discussed with CRNA.                   Leslie Rice MD   5/31/2019

## 2019-05-31 NOTE — H&P
03/30/2017     Social History:  Social History     Socioeconomic History    Marital status:      Spouse name: Not on file    Number of children: Not on file    Years of education: Not on file    Highest education level: Not on file   Occupational History    Not on file   Social Needs    Financial resource strain: Not on file    Food insecurity:     Worry: Not on file     Inability: Not on file    Transportation needs:     Medical: Not on file     Non-medical: Not on file   Tobacco Use    Smoking status: Never Smoker    Smokeless tobacco: Never Used   Substance and Sexual Activity    Alcohol use: Yes     Comment: Rarely    Drug use: No    Sexual activity: Not on file   Lifestyle    Physical activity:     Days per week: Not on file     Minutes per session: Not on file    Stress: Not on file   Relationships    Social connections:     Talks on phone: Not on file     Gets together: Not on file     Attends [employer] service: Not on file     Active member of club or organization: Not on file     Attends meetings of clubs or organizations: Not on file     Relationship status: Not on file    Intimate partner violence:     Fear of current or ex partner: Not on file     Emotionally abused: Not on file     Physically abused: Not on file     Forced sexual activity: Not on file   Other Topics Concern    Not on file   Social History Narrative    Not on file     Family History:  AL  Family History   Problem Relation Age of Onset    Cancer Mother 80    Stroke Mother     High Blood Pressure Mother     High Blood Pressure Father      Allergies: Allergies   Allergen Reactions    Penicillins      Unsure-hasn't taken in over 40 yrs. ROS:  Constitutional: Negative for chills, fatigue, fever, or weight loss. Eyes: Denies reported visual changes. ENT: Denies headache, difficulty swallowing, nose bleeds, ringing in ears, or earaches.   Cardiovascular: Negative for chest pain, palpitations, tachycardia Date    WBC 6.9 05/31/2019    RBC 3.94 05/31/2019    HGB 13.1 05/31/2019    HCT 38.9 05/31/2019    MCV 98.7 05/31/2019    MCH 33.2 05/31/2019    MCHC 33.7 05/31/2019    RDW 13.1 04/09/2017     05/31/2019    MPV 8.5 05/31/2019     BMP:    Lab Results   Component Value Date     05/31/2019    K 4.1 05/31/2019     05/31/2019    CO2 22 05/31/2019    BUN 25 05/31/2019    CREATININE 1.4 05/31/2019    CALCIUM 9.6 05/31/2019    LABGLOM 47 05/31/2019    GLUCOSE 118 05/31/2019     BUN/Creatinine:    Lab Results   Component Value Date    BUN 25 05/31/2019    CREATININE 1.4 05/31/2019     Magnesium:    Lab Results   Component Value Date    MG 2.1 01/15/2017     Phosphorus:  No results found for: PHOS  PT/INR:  No results found for: PROTIME, INR  U/A:    Lab Results   Component Value Date    NITRITE negative 02/13/2017    COLORU RED 05/31/2019    PHUR 7.5 05/31/2019    LABCAST NONE SEEN 01/14/2017    LABCAST NONE SEEN 01/14/2017    WBCUA > 100 05/31/2019    RBCUA > 200 05/31/2019    MUCUS NONE SEEN 01/30/2019    YEAST NONE SEEN 03/25/2019    BACTERIA FEW 05/31/2019    CLARITYU clear 02/13/2017    SPECGRAV 1.020 02/13/2017    SPECGRAV 1.013 01/14/2017    LEUKOCYTESUR LARGE 05/31/2019    UROBILINOGEN 0.2 05/31/2019    BILIRUBINUR NEGATIVE 05/31/2019    BILIRUBINUR negative 02/13/2017    BLOODU LARGE 05/31/2019    GLUCOSEU NEGATIVE 05/31/2019    AMORPHOUS DEBRIS 03/25/2019       Imaging:   No imaging    IMPRESSION:   UTI  Gross hematuria  Urinary retention  S/p TURBN and TURP 5/8/19  Acute pain    Plan:  Keep him NPO. Consent for cystoscopy, possible clot evacuation, with possible fulguration of bleeders per Dr. Ward Luong. Mirza catheter will be inserted in surgery. Family on the way in to hospital now.         Tariq Skinner, APRN-CNP  05/31/19 2:55 PM  Urology

## 2019-05-31 NOTE — ED NOTES
----- Message from Marina Roland MA sent at 9/11/2017  9:30 AM CDT -----  Mom is requesting a refill on her Zofran. Please send to [facility].    Patient reports he is cold. Temperature increased in room and applied warm blankets.      Patti Ernandez RN  05/31/19 1300

## 2019-05-31 NOTE — ED NOTES
Removed previous catheter due to no drainage in bag. Attempted to insert 3 way irrigation multiple times. Blood clots noted with insertion. Irrigated with irrigation fluid manually to removed blood clots. Unable to insert catheter. Notified provider. Patient unable to urinate still.      Vincent Daniels RN  05/31/19 1614

## 2019-05-31 NOTE — ANESTHESIA POSTPROCEDURE EVALUATION
Department of Anesthesiology  Postprocedure Note    Patient: Travon Bernard  MRN: [id number]  YOB: 1926  Date of evaluation: 5/31/2019  Time:  7:45 PM     Procedure Summary     Date:  05/31/19 Room / Location:  [facility] / [address]    Anesthesia Start:  1529 Anesthesia Stop:  5165    Procedure:  CYSTOSCOPY, EVACUATION OF CLOTS, FULGERATION OF BLEEDERS (N/A ) Diagnosis:  (CLOTS)    Surgeon:  Stefanie Cruz MD Responsible Provider:  Dereje eDe MD    Anesthesia Type:  general ASA Status:  3          Anesthesia Type: No value filed. Peter Phase I: Peter Score: 10    Peter Phase II:      Last vitals: Reviewed and per EMR flowsheets. Anesthesia Post Evaluation   [address]  POST-ANESTHESIA NOTE       Name:  Travon Bernard                                         Age:  80 y.o.   MRN:  [id number]      Last Vitals:  BP (!) 132/59   Pulse 94   Temp 97.9 °F (36.6 °C) (Oral)   Resp 16   Ht 6' (1.829 m)   Wt 184 lb 11.2 oz (83.8 kg)   SpO2 95%   BMI 25.05 kg/m²   Patient Vitals for the past 4 hrs:   BP Temp Temp src Pulse Resp SpO2 Height Weight   05/31/19 1944 (!) 132/59 97.9 °F (36.6 °C) Oral 94 16 95 % -- --   05/31/19 1818 -- -- -- -- -- -- 6' (1.829 m) 184 lb 11.2 oz (83.8 kg)   05/31/19 1805 138/71 99.4 °F (37.4 °C) Oral 101 16 97 % -- --   05/31/19 1755 (!) 125/58 -- -- 97 21 93 % -- --   05/31/19 1750 130/63 -- -- 97 17 95 % -- --   05/31/19 1745 (!) 125/59 -- -- 96 17 94 % -- --   05/31/19 1740 128/70 -- -- 96 16 94 % -- --   05/31/19 1735 130/70 -- -- 97 15 95 % -- --   05/31/19 1730 133/68 -- -- 97 16 95 % -- --   05/31/19 1725 130/70 -- -- 97 16 92 % -- --   05/31/19 1720 138/70 -- -- 98 10 94 % -- --   05/31/19 1715 (!) 140/72 -- -- 97 13 95 % -- --   05/31/19 1710 (!) 145/70 -- -- 99 12 94 % -- --   05/31/19 1705 (!) 155/72 -- -- 99 12 96 % -- --   05/31/19 1653 (!) 165/75 101.5 °F (38.6 °C) Temporal 102 12 95 % -- --       Level of Consciousness: Awake    Respiratory:  Stable    Oxygen Saturation:  Stable    Cardiovascular:  Stable    Hydration:  Adequate    PONV:  Stable    Post-op Pain:  Adequate analgesia    Post-op Assessment:  No apparent anesthetic complications    Additional Follow-Up / Treatment / Comment:  None    Paul Santiago MD  May 31, 2019   7:45 PM

## 2019-05-31 NOTE — PROGRESS NOTES
1653 Awake and oriented on arrival to PACU , [facility] elevated , denies any pain   1705 resting resp easy   1720 pt looking around denies any needs   1735 resting on and off denies any needs  1750 pt awakens to name denies any needs  1802 pt meets criteria for discharge , transported to [address] [place] [address] son in room waiting

## 2019-05-31 NOTE — ED PROVIDER NOTES
[facility] EMERGENCY DEPT      CHIEF COMPLAINT       Chief Complaint   Patient presents with    Other     bladder pain/spasms       Nurses Notes reviewed and I agree except asnoted in the HPI. HISTORY OFPRESENT ILLNESS    Mercedes Camargo is a 80 y.o. male who presents to the emergency department for evaluation of suprapubic abdominal pain and difficulty urinating. The patient has a history of prostate cancer with bladder outflow obstruction for which he follows with Dr. Carrillo Lafleur, Urology. The patient had a TURP and TURBN procedure performed on 5/8/19 by Dr. Carrillo Lafleur with subsequent improvement in his urination. The patient is currently undergoing active surveillance with intermittent androgen deprivation therapy for treatment of his prostate cancer. The patient states that he had been feeling well until last night. He states that he began to experience suprapubic abdominal pain that has gradually worsened since its onset. He currently reports his pain to be at an 8/10 in severity. The patient also reports that he began having intermittent difficulty with urination and feeling as though he was incompletely voiding his bladder when he urinated. He states that this has also worsened. He states that this morning, he feels like he cannot urinate at all. He reports that he noted blood in his urine last night. He denies any nausea, vomiting, fevers, chills, chest pain, or shortness of breath. The patient has a history of hypertension and CKD. There are no additional complaints at this time. REVIEW OF SYSTEMS      Review of Systems   Constitutional: Negative for chills, fatigue and fever. HENT: Negative for congestion, ear pain, rhinorrhea and sore throat. Eyes: Negative for discharge and redness. Respiratory: Negative for cough, shortness of breath and wheezing. Cardiovascular: Negative for chest pain and palpitations. Gastrointestinal: Positive for abdominal pain (suprapubic).  Negative for constipation, diarrhea, nausea and vomiting. Genitourinary: Positive for difficulty urinating and hematuria. Negative for decreased urine volume, dysuria, flank pain, frequency and urgency. Musculoskeletal: Negative for arthralgias, back pain, myalgias, neck pain and neck stiffness. Skin: Negative for color change, pallor and rash. Neurological: Negative for dizziness, syncope, weakness, light-headedness, numbness and headaches. Hematological: Negative for adenopathy. Psychiatric/Behavioral: Negative for agitation, confusion, dysphoric mood and suicidal ideas. The patient is not nervous/anxious. PAST MEDICAL HISTORY    has a past medical history of Cancer ([facility] [address] Hyperlipidemia, Hypertension, Kidney stone, and Thyroid disease. SURGICAL HISTORY      has a past surgical history that includes Prostate biopsy; other surgical history (Right, ); hernia repair; Inguinal hernia repair (Right, 2017); TURP (2017); and Cystoscopy (N/A, 2019). CURRENT MEDICATIONS       Previous Medications    AMLODIPINE (NORVASC) 10 MG TABLET    Take 1 tablet by mouth daily    LEVOTHYROXINE (SYNTHROID) 100 MCG TABLET    Take 1 tablet by mouth Daily       ALLERGIES     is allergic to penicillins. FAMILY HISTORY     indicated that his mother is . He indicated that his father is . [unfilled]    SOCIAL HISTORY      reports that he has never smoked. He has never used smokeless tobacco. He reports that he drinks alcohol. He reports that he does not use drugs. PHYSICAL EXAM     INITIAL VITALS:  height is 6' (1.829 m) and weight is 175 lb (79.4 kg). His oral temperature is 97.8 °F (36.6 °C). His blood pressure is 157/76 (abnormal) and his pulse is 95. His respiration is 16 and oxygen saturation is 100%. Physical Exam   Constitutional: He is oriented to person, place, and time. He appears well-developed and well-nourished. No distress. HENT:   Head: Normocephalic and atraumatic. Right Ear: External ear normal.   Left Ear: External ear normal.   Nose: Nose normal.   Mouth/Throat: Oropharynx is clear and moist.   Eyes: Pupils are equal, round, and reactive to light. Conjunctivae and EOM are normal. Right eye exhibits no discharge. Left eye exhibits no discharge. No scleral icterus. Neck: Normal range of motion. Neck supple. Cardiovascular: Normal rate, regular rhythm and normal heart sounds. Exam reveals no gallop and no friction rub. No murmur heard. Pulmonary/Chest: Effort normal and breath sounds normal. No stridor. No respiratory distress. He has no wheezes. He has no rales. He exhibits no tenderness. Abdominal: Soft. Bowel sounds are normal. He exhibits no distension and no mass. There is no hepatosplenomegaly. There is tenderness (mild) in the suprapubic area. There is no rigidity, no rebound, no guarding, no CVA tenderness, no tenderness at McBurney's point and negative Alicea's sign. No hernia. Musculoskeletal: Normal range of motion. He exhibits no edema. Lymphadenopathy:     He has no cervical adenopathy. Neurological: He is alert and oriented to person, place, and time. He exhibits normal muscle tone. Coordination normal. GCS eye subscore is 4. GCS verbal subscore is 5. GCS motor subscore is 6. Skin: Skin is warm and dry. No rash noted. He is not diaphoretic. No erythema. No pallor. Psychiatric: He has a normal mood and affect. His behavior is normal. Thought content normal.   Nursing note and vitals reviewed.             DIFFERENTIAL DIAGNOSIS:   Includes but not limited to: Bladder outflow obstruction, BPH, prostate cancer, postprocedure pain, cystitis, UTI    DIAGNOSTIC RESULTS     EKG: All EKG's are interpreted by the Emergency Department Physician who either signs or Co-signsthis chart in the absence of a cardiologist.  None    RADIOLOGY: non-plain film images(s) such as CT, Ultrasound and MRI are read by the radiologist.  Plainradiographic images are visualized and preliminarily interpreted by the emergency physician unless otherwise stated below. No orders to display       LABS:   Labs Reviewed   CBC WITH AUTO DIFFERENTIAL - Abnormal; Notable for the following components:       Result Value    RBC 3.94 (*)     Hemoglobin 13.1 (*)     Hematocrit 38.9 (*)     MCV 98.7 (*)     MCH 33.2 (*)     RDW-SD 47.7 (*)     MPV 8.5 (*)     All other components within normal limits   BASIC METABOLIC PANEL - Abnormal; Notable for the following components:    CO2 22 (*)     Glucose 118 (*)     BUN 25 (*)     CREATININE 1.4 (*)     All other components within normal limits   HEPATIC FUNCTION PANEL - Abnormal; Notable for the following components:    ALT 8 (*)     All other components within normal limits   GLOMERULAR FILTRATION RATE, ESTIMATED - Abnormal; Notable for the following components:    Est, Glom Filt Rate 47 (*)     All other components within normal limits   URINE WITH REFLEXED MICRO - Abnormal; Notable for the following components:    Blood, Urine LARGE (*)     Protein,  (*)     Nitrite, Urine POSITIVE (*)     Leukocyte Esterase, Urine LARGE (*)     Color, UA RED (*)     Character, Urine TURBID (*)     All other components within normal limits   CULTURE BLOOD #1   CULTURE BLOOD #2   URINE CULTURE, REFLEXED   LIPASE   ANION GAP   OSMOLALITY       EMERGENCY DEPARTMENT COURSE:   Vitals:    Vitals:    05/31/19 0732 05/31/19 0912 05/31/19 1035 05/31/19 1148   BP: (!) 170/91 135/71 (!) 151/86 (!) 157/76   Pulse: 106 100 96 95   Resp: 16 16 16 16   Temp: 97.8 °F (36.6 °C)      TempSrc: Oral      SpO2: 100% 99% 98% 100%   Weight: 175 lb (79.4 kg)      Height: 6' (1.829 m)        The patient was seen and evaluated within the ED today with suprapubic abdominal pain and difficulty urinating. Within the department, I observed the patient's vital signs to be within acceptable range, and he was afebrile.   On exam, I appreciated mild suprapubic abdominal tenderness without rigidity, guarding, or rebound tenderness. Bladder scan revealed 481 cc retained urine. Laboratory work was reassuring. WBC count was within normal range. BUN 25 and Cr 1.4 - this is within the patient's baseline range. Within the department, the patient was treated with Tylenol followed by Morphine, and a Mirza catheter was placed. He was given a dose of IV Cipro. Bladder irrigation was attempted with a 3-way catheter, but the patient had significant pain and blood clots were noted. There was difficulty in placing another Mirza catheter due to pain and resistance in advancing the catheter, although one was placed. Bedside US performed by Dr. Nuria Boo showed a blood clot in the bladder. I observed the patient's condition to remain stable during the duration of the stay. I explained my proposed course of treatment to the patient, who was amenable to my treatment and admission decisions. Jayleen Manzano Urology NP, was consulted and kindly agreed to admit the patient for further evaluation and management under Esther Chua. The patient remained stable and maintained stable vital signs until admission to the floor. He has been made NPO. CRITICAL CARE:   None    CONSULTS:  Discussed the case with my attending physician in the Emergency Department, who agreed with my workup, treatment, and disposition decisions. Esther Wynn NP - Recommends bladder irrigation within the department. Advises discharge to home with antibiotics and with a Mirza catheter in place. Advises the patient to follow up with Dr. Ezra Armando Urology office in 2 days for further management. Esther Wynn NP - Called and discussed the case again after failed bladder irrigation. She will have Swapna Chuay, consult on this patient. After discussion with him, she advises admission for further management under his care. PROCEDURES:  None    FINAL IMPRESSION      1.  Urinary tract infection with hematuria, site unspecified    2. Urinary retention          DISPOSITION/PLAN     1. Urinary tract infection with hematuria, site unspecified    2.  Urinary retention       Admit under Urology services    PATIENT REFERRED TO:  Cayetano Osler, [phone]    Call in 2 days  As needed, If symptoms worsen    OLGA Bergman [facility] - [place]  [address]  [phone]            DISCHARGE MEDICATIONS:  New Prescriptions    NITROFURANTOIN, MACROCRYSTAL-MONOHYDRATE, (MACROBID) 100 MG CAPSULE    Take 1 capsule by mouth 2 times daily for 10 days       (Please note that portions of this note werecompleted with a voice recognition program.  Efforts were made to edit the dictations but occasionally words are mis-transcribed.)    ZION Johnson PA-C  05/31/19 Krys 4, ZION  05/31/19 Becka 59, ZION  05/31/19 9980

## 2019-06-01 LAB
ALBUMIN SERPL-MCNC: 3.4 G/DL (ref 3.5–5.1)
ALP BLD-CCNC: 72 U/L (ref 38–126)
ALT SERPL-CCNC: 8 U/L (ref 11–66)
ANION GAP SERPL CALCULATED.3IONS-SCNC: 12 MEQ/L (ref 8–16)
AST SERPL-CCNC: 28 U/L (ref 5–40)
BASOPHILS # BLD: 0.2 %
BASOPHILS ABSOLUTE: 0 THOU/MM3 (ref 0–0.1)
BILIRUB SERPL-MCNC: 0.6 MG/DL (ref 0.3–1.2)
BUN BLDV-MCNC: 21 MG/DL (ref 7–22)
CALCIUM SERPL-MCNC: 8.2 MG/DL (ref 8.5–10.5)
CHLORIDE BLD-SCNC: 105 MEQ/L (ref 98–111)
CO2: 22 MEQ/L (ref 23–33)
CREAT SERPL-MCNC: 1.3 MG/DL (ref 0.4–1.2)
EOSINOPHIL # BLD: 0 %
EOSINOPHILS ABSOLUTE: 0 THOU/MM3 (ref 0–0.4)
ERYTHROCYTE [DISTWIDTH] IN BLOOD BY AUTOMATED COUNT: 13.6 % (ref 11.5–14.5)
ERYTHROCYTE [DISTWIDTH] IN BLOOD BY AUTOMATED COUNT: 50.4 FL (ref 35–45)
GFR SERPL CREATININE-BSD FRML MDRD: 52 ML/MIN/1.73M2
GLUCOSE BLD-MCNC: 115 MG/DL (ref 70–108)
HCT VFR BLD CALC: 33.4 % (ref 42–52)
HEMOGLOBIN: 11.1 GM/DL (ref 14–18)
IMMATURE GRANS (ABS): 0.05 THOU/MM3 (ref 0–0.07)
IMMATURE GRANULOCYTES: 0.4 %
LYMPHOCYTES # BLD: 3.3 %
LYMPHOCYTES ABSOLUTE: 0.4 THOU/MM3 (ref 1–4.8)
MCH RBC QN AUTO: 33.7 PG (ref 26–33)
MCHC RBC AUTO-ENTMCNC: 33.2 GM/DL (ref 32.2–35.5)
MCV RBC AUTO: 101.5 FL (ref 80–94)
MONOCYTES # BLD: 4 %
MONOCYTES ABSOLUTE: 0.5 THOU/MM3 (ref 0.4–1.3)
NUCLEATED RED BLOOD CELLS: 0 /100 WBC
PLATELET # BLD: 233 THOU/MM3 (ref 130–400)
PMV BLD AUTO: 8.6 FL (ref 9.4–12.4)
POTASSIUM REFLEX MAGNESIUM: 4 MEQ/L (ref 3.5–5.2)
RBC # BLD: 3.29 MILL/MM3 (ref 4.7–6.1)
SEG NEUTROPHILS: 92.1 %
SEGMENTED NEUTROPHILS ABSOLUTE COUNT: 11.7 THOU/MM3 (ref 1.8–7.7)
SODIUM BLD-SCNC: 139 MEQ/L (ref 135–145)
TOTAL PROTEIN: 6.2 G/DL (ref 6.1–8)
WBC # BLD: 12.7 THOU/MM3 (ref 4.8–10.8)

## 2019-06-01 PROCEDURE — 99024 POSTOP FOLLOW-UP VISIT: CPT | Performed by: NURSE PRACTITIONER

## 2019-06-01 PROCEDURE — 85025 COMPLETE CBC W/AUTO DIFF WBC: CPT

## 2019-06-01 PROCEDURE — 2580000003 HC RX 258: Performed by: UROLOGY

## 2019-06-01 PROCEDURE — 80053 COMPREHEN METABOLIC PANEL: CPT

## 2019-06-01 PROCEDURE — 2580000003 HC RX 258: Performed by: NURSE PRACTITIONER

## 2019-06-01 PROCEDURE — 96361 HYDRATE IV INFUSION ADD-ON: CPT

## 2019-06-01 PROCEDURE — 6360000002 HC RX W HCPCS: Performed by: UROLOGY

## 2019-06-01 PROCEDURE — 1200000000 HC SEMI PRIVATE

## 2019-06-01 PROCEDURE — 6370000000 HC RX 637 (ALT 250 FOR IP): Performed by: UROLOGY

## 2019-06-01 PROCEDURE — 6360000002 HC RX W HCPCS: Performed by: NURSE PRACTITIONER

## 2019-06-01 PROCEDURE — 36415 COLL VENOUS BLD VENIPUNCTURE: CPT

## 2019-06-01 PROCEDURE — 2709999900 HC NON-CHARGEABLE SUPPLY

## 2019-06-01 RX ORDER — SULFAMETHOXAZOLE AND TRIMETHOPRIM 800; 160 MG/1; MG/1
1 TABLET ORAL EVERY 12 HOURS SCHEDULED
Status: DISCONTINUED | OUTPATIENT
Start: 2019-06-01 | End: 2019-06-01

## 2019-06-01 RX ORDER — BISACODYL 10 MG
10 SUPPOSITORY, RECTAL RECTAL ONCE
Status: DISCONTINUED | OUTPATIENT
Start: 2019-06-01 | End: 2019-06-03 | Stop reason: HOSPADM

## 2019-06-01 RX ADMIN — CEFTRIAXONE SODIUM 1 G: 1 INJECTION, POWDER, FOR SOLUTION INTRAMUSCULAR; INTRAVENOUS at 19:33

## 2019-06-01 RX ADMIN — VANCOMYCIN HYDROCHLORIDE 1250 MG: 5 INJECTION, POWDER, LYOPHILIZED, FOR SOLUTION INTRAVENOUS at 20:07

## 2019-06-01 RX ADMIN — SODIUM CHLORIDE: 9 INJECTION, SOLUTION INTRAVENOUS at 08:17

## 2019-06-01 RX ADMIN — CIPROFLOXACIN 400 MG: 2 INJECTION, SOLUTION INTRAVENOUS at 11:45

## 2019-06-01 RX ADMIN — ACETAMINOPHEN 650 MG: 325 TABLET ORAL at 08:15

## 2019-06-01 RX ADMIN — AMLODIPINE BESYLATE 10 MG: 10 TABLET ORAL at 08:18

## 2019-06-01 RX ADMIN — ACETAMINOPHEN 650 MG: 325 TABLET ORAL at 16:59

## 2019-06-01 ASSESSMENT — PAIN SCALES - GENERAL: PAINLEVEL_OUTOF10: 0

## 2019-06-01 NOTE — PLAN OF CARE
Problem: Falls - Risk of:  Goal: Will remain free from falls  Description  Will remain free from falls  6/1/2019 1457 by Ortiz Blanco RN  Outcome: Ongoing  Note:   Up with assist, free from falls. Up to chair. Problem: SAFETY  Goal: Free from accidental physical injury  Outcome: Ongoing     Problem: DAILY CARE  Goal: Daily care needs are met  6/1/2019 1457 by Ortiz Blanco RN  Outcome: Ongoing  Note:   Patient bathed and collazo care with chlorhexidine. Problem: PAIN  Goal: Patient's pain/discomfort is manageable  6/1/2019 1457 by Ortiz Blanco RN  Outcome: Ongoing  Note:   No complains of pain. Problem: DISCHARGE BARRIERS  Goal: Patient's continuum of care needs are met  6/1/2019 1457 by Ortiz Blanco RN  Outcome: Ongoing  Note:   Plans home with help from son. Lives alone. Care plan reviewed with patient and family. Patient and family verbalize understanding of the plan of care and contribute to goal setting.

## 2019-06-01 NOTE — PROGRESS NOTES
Urology Progress Note    Chief Complaint: Gross hematuria    Subjective:     Pt is up in chair. Nursing staff reports pt had some confusion last night. Now oriented to person, place, year, and month. Denies pain. Mirza with light pink urine. No clots. Denies chest pain, shortness of breath, n/v. No flatus or BM. No leg pain or swelling. Negative homans. Vitals:  BP (!) 148/65   Pulse 93   Temp 100.8 °F (38.2 °C) (Oral)   Resp 16   Ht 6' (1.829 m)   Wt 184 lb 11.2 oz (83.8 kg)   SpO2 96%   BMI 25.05 kg/m²   Temp  Av.5 °F (37.5 °C)  Min: 97.9 °F (36.6 °C)  Max: 101.9 °F (38.8 °C)    Intake/Output Summary (Last 24 hours) at 2019 0945  Last data filed at 2019 0600  Gross per 24 hour   Intake 2284.11 ml   Output 850 ml   Net 1434.11 ml       Social History     Socioeconomic History    Marital status:       Spouse name: Not on file    Number of children: Not on file    Years of education: Not on file    Highest education level: Not on file   Occupational History    Not on file   Social Needs    Financial resource strain: Not on file    Food insecurity:     Worry: Not on file     Inability: Not on file    Transportation needs:     Medical: Not on file     Non-medical: Not on file   Tobacco Use    Smoking status: Never Smoker    Smokeless tobacco: Never Used   Substance and Sexual Activity    Alcohol use: Yes     Comment: Rarely    Drug use: No    Sexual activity: Not on file   Lifestyle    Physical activity:     Days per week: Not on file     Minutes per session: Not on file    Stress: Not on file   Relationships    Social connections:     Talks on phone: Not on file     Gets together: Not on file     Attends Jewish service: Not on file     Active member of club or organization: Not on file     Attends meetings of clubs or organizations: Not on file     Relationship status: Not on file    Intimate partner violence:     Fear of current or ex partner: Not on file     Emotionally abused: Not on file     Physically abused: Not on file     Forced sexual activity: Not on file   Other Topics Concern    Not on file   Social History Narrative    Not on file     Family History   Problem Relation Age of Onset    Cancer Mother 80    Stroke Mother     High Blood Pressure Mother     High Blood Pressure Father      Allergies   Allergen Reactions    Penicillins      Unsure-hasn't taken in over 40 yrs. Constitutional: Alert and oriented times x3, no acute distress, and cooperative to examination with appropriate mood and affect. HEENT:   Head:         Normocephalic and atraumatic. Mucous membranes are normal.   Eyes:         EOM are normal. No scleral icterus. Nose:    The external appearance of the nose is normal  Ears: The ears appear normal to external inspection. Cardiovascular:       Normal rate, regular rhythm. Pulmonary/Chest:  Normal respiratory rate and rhthym. No use of accessory muscles. Lungs clear bilaterally. Abdominal:          Soft. No tenderness. Active bowel sounds. Genitalia:    Mirza catheter draining pink colored urine. Musculoskeletal:    Normal range of motion. He exhibits no edema or tenderness of lower extremities. Extremities:    No cyanosis, clubbing, or edema present. Neurological:    Alert and oriented.      Labs:  WBC:    Lab Results   Component Value Date    WBC 12.7 06/01/2019     Hemoglobin/Hematocrit:    Lab Results   Component Value Date    HGB 11.1 06/01/2019    HCT 33.4 06/01/2019     BMP:    Lab Results   Component Value Date     06/01/2019    K 4.0 06/01/2019     06/01/2019    CO2 22 06/01/2019    BUN 21 06/01/2019    LABALBU 3.4 06/01/2019    CREATININE 1.3 06/01/2019    CALCIUM 8.2 06/01/2019    LABGLOM 52 06/01/2019       Impression:  Gross hematuria  Acute cystitis  Leukocytosis  Fever  S/p TURBN and TURP, removal of bladder stones, and injection of Clarix Flow by Dr. Alicia Duarte

## 2019-06-01 NOTE — PROGRESS NOTES
Pharmacy Note  Vancomycin Consult    Issac Hilton is a 80 y.o. male started on Vancomycin for UTI s/p TURBN and TURP; consult received from Ivan Smith CNP (Dr. Maicol Mtz) to manage therapy. Also receiving the following antibiotics: ceftriaxone. Patient Active Problem List   Diagnosis    Bladder outflow obstruction    Prostate cancer ([facility] [address]  recurrent    Essential hypertension    Chronic kidney disease, stage IV (severe) (HCC)    Acute cystitis with hematuria    Orthostatic hypotension    VADIM (acute kidney injury) ([facility] [address]    Leukocytosis    Hypokalemia    History of prostate cancer    Normocytic anemia    Unilateral inguinal hernia without obstruction or gangrene    BPH with obstruction/lower urinary tract symptoms    Bladder stones    Bladder neck contracture    Gross hematuria    Acute delirium    S/P TURP       Allergies:  Penicillins     Temp max: 102.5    Recent Labs     05/31/19  0810 06/01/19  0451   BUN 25* 21       Recent Labs     05/31/19  0810 06/01/19  0451   CREATININE 1.4* 1.3*       Recent Labs     05/31/19  0810 06/01/19  0451   WBC 6.9 12.7*         Intake/Output Summary (Last 24 hours) at 6/1/2019 1709  Last data filed at 6/1/2019 1452  Gross per 24 hour   Intake 1834.11 ml   Output 1250 ml   Net 584.11 ml       Culture Date Source Results   05/31/19 urine staphylococcus   05/31/19 Blood x 2 ngtd         Ht Readings from Last 1 Encounters:   05/31/19 6' (1.829 m)        Wt Readings from Last 1 Encounters:   05/31/19 184 lb 11.2 oz (83.8 kg)         Body mass index is 25.05 kg/m². Estimated Creatinine Clearance: 40 mL/min (A) (based on SCr of 1.3 mg/dL (H)). Goal Trough Level: 10-20 mcg/mL    Assessment/Plan:  Will initiate vancomycin 1250 mg IV every 24 hours. Timing of trough level will be determined based on culture results, renal function, and clinical response. Thank you for the consult. Will continue to follow.     Meg Knight, PharmD  6/1/2019  5:12 PM

## 2019-06-01 NOTE — OP NOTE
[address] [place] [address]                                OPERATIVE REPORT    PATIENT NAME: Vinicio Harris                  :        1926  MED REC NO:   [id number]                           ROOM:       3691  ACCOUNT NO:   [de-identified]                           ADMIT DATE: 2019  PROVIDER:     Ronda He M.D.    Dangelo Hudson OF PROCEDURE:  2019    PREOPERATIVE DIAGNOSIS:  Clot retention. POSTOPERATIVE DIAGNOSES:  1. Clot retention and hematuria. 2.  Blunt trauma to the bulbar urethra secondary to attempted catheter  insertions. PROCEDURES PERFORMED:  1. Cystoscopy. 2.  Evacuation of blood clots. 3.  Filtration of bleeding site. 4.  Insertion of catheter, difficult. ANESTHESIA:  General.    SURGEON:  Ronda He MD    INDICATIONS:  The patient is a 80-year-old white male who underwent a  TURP and TUR bladder neck contracture approximately three weeks ago. The patient was doing well until he developed gross hematuria late last  night, continued this morning, and developed clot retention. He came  into the emergency department where he was evaluated. Mirza catheter  was inserted, gross hematuria returned. Emergency department staff  removed this catheter and tried to get in a three-way Mirza catheter,  which was unsuccessful. As such, I recommended further evaluation under  general anesthesia. The patient understands the procedure, common side  effects, potential complications, and the need to have a Mirza catheter  most likely for the next few days postoperatively. He agrees to  proceed. OPERATIVE NOTE:  After informed consent was signed and the patient was  properly identified, the patient was taken to the operating room, placed  on the operating room table in the supine position where he was given  general anesthesia. LMA was used for an airway.   He was then placed in  the dorsal lithotomy position. Genitalia were prepped and draped in the  usual sterile manner. The patient has mild urethral meatal stenosis,  which was dilated with Keyla [place] sounds. A 22-Polish cystoscope sheath  and 30-degree lens were used to inspect the urethra. The pendulous  urethra appears normal.  In the bulbar urethra, there are two false  passages at the 9 o'clock and 6 o'clock positions. Fortunately, I was  able to identify the true urethral lumen and advanced the scope through  the membranous urethra. The prostatic urethra reveals a well-resected  fossa with some eschar and some venous bleeding sites noted. The  bladder was entered and drained. The bladder was irrigated manually  several times with the Ellik to evacuate a number of smaller clots. Circumferential inspection of the bladder was then performed. There was  diffuse trabeculation of the bladder with multiple cellules and shallow  diverticula. No tumor or stone was identified. The ureteral orifices  were located and appeared normal.  A 0.035 inch diameter safety wire was  passed through the cystoscopic sheath and left in place. The scope and  sheath were removed. A 24-Polish resectoscope sheath and 30-degree lens  with the [address] and the plasma button were attached to  the resectoscope sheath. The entire device was carefully passed using  the wire as a visual guide and was able to enter the bladder. The  plasma button was then used to vaporize and cauterize necrotic tissue  and venous bleeding sites. Once this was accomplished with excellent  hemostasis was noted in the fossa and no bleeding from the bladder, the  scope was retracted slowly to visualize the traumatic bulbar urethra. There were two small clots in each false passage, which I left  undisturbed. Resectoscope and sheath were removed.   Over the dual-flex  safety wire, a 22-Polish Koi catheter was passed atraumatically into  the bladder; 30 mL of sterile water was placed in the balloon. The  Mirza catheter was then irrigated manually and excellent clear return  was noted. It should also be noted there was venous bleeding around the  catheter consistent with the bleeding coming from bulbar urethra,  secondary to the trauma. Catheter was attached to the 2-liter bedside  bag. This completed the procedure. The patient tolerated the procedure  well. Estimated blood loss was 50 mL. No new complications were noted. The patient turned to PACU.         Jannelle Prader, M.D.    D: 05/31/2019 17:04:25       T: 05/31/2019 18:51:47     [email or web address]  Job#: [id number]     Doc#: [id number]    CC:

## 2019-06-01 NOTE — PLAN OF CARE
Problem: Falls - Risk of:  Goal: Will remain free from falls  Description  Will remain free from falls  Outcome: Ongoing  Note:   Free from falls safety measures continues call light in reach SR x2. Bed alarm on . Problem: DAILY CARE  Goal: Daily care needs are met  Outcome: Met This Shift     Problem: PAIN  Goal: Patient's pain/discomfort is manageable  Outcome: Met This Shift     Problem: SKIN INTEGRITY  Goal: Skin integrity is maintained or improved  Outcome: Met This Shift     Problem: KNOWLEDGE DEFICIT  Goal: Patient/S.O. demonstrates understanding of disease process, treatment plan, medications, and discharge instructions. Outcome: Ongoing  Note:   Pt oriented to name only. Doesn't know where he is time or situation.  Pleasant cooperative     Problem: DISCHARGE BARRIERS  Goal: Patient's continuum of care needs are met  Outcome: Ongoing  Note:   Pt family will take him home Saturday after dr shields

## 2019-06-01 NOTE — PLAN OF CARE
Problem: Falls - Risk of:  Goal: Will remain free from falls  Description  Will remain free from falls  Outcome: Ongoing  Note:   Free from falls safety measures continues call light in reach SR x2. Bed alarm on .

## 2019-06-02 LAB
ANION GAP SERPL CALCULATED.3IONS-SCNC: 10 MEQ/L (ref 8–16)
BASOPHILS # BLD: 0.1 %
BASOPHILS ABSOLUTE: 0 THOU/MM3 (ref 0–0.1)
BUN BLDV-MCNC: 23 MG/DL (ref 7–22)
CALCIUM SERPL-MCNC: 8.5 MG/DL (ref 8.5–10.5)
CHLORIDE BLD-SCNC: 106 MEQ/L (ref 98–111)
CO2: 21 MEQ/L (ref 23–33)
CREAT SERPL-MCNC: 1.2 MG/DL (ref 0.4–1.2)
EOSINOPHIL # BLD: 0 %
EOSINOPHILS ABSOLUTE: 0 THOU/MM3 (ref 0–0.4)
ERYTHROCYTE [DISTWIDTH] IN BLOOD BY AUTOMATED COUNT: 13.4 % (ref 11.5–14.5)
ERYTHROCYTE [DISTWIDTH] IN BLOOD BY AUTOMATED COUNT: 49.6 FL (ref 35–45)
GFR SERPL CREATININE-BSD FRML MDRD: 57 ML/MIN/1.73M2
GLUCOSE BLD-MCNC: 138 MG/DL (ref 70–108)
HCT VFR BLD CALC: 32.8 % (ref 42–52)
HEMOGLOBIN: 11 GM/DL (ref 14–18)
IMMATURE GRANS (ABS): 0.04 THOU/MM3 (ref 0–0.07)
IMMATURE GRANULOCYTES: 0.6 %
LYMPHOCYTES # BLD: 10.1 %
LYMPHOCYTES ABSOLUTE: 0.7 THOU/MM3 (ref 1–4.8)
MCH RBC QN AUTO: 33.6 PG (ref 26–33)
MCHC RBC AUTO-ENTMCNC: 33.5 GM/DL (ref 32.2–35.5)
MCV RBC AUTO: 100.3 FL (ref 80–94)
MONOCYTES # BLD: 7.2 %
MONOCYTES ABSOLUTE: 0.5 THOU/MM3 (ref 0.4–1.3)
NUCLEATED RED BLOOD CELLS: 0 /100 WBC
PLATELET # BLD: 201 THOU/MM3 (ref 130–400)
PLATELET ESTIMATE: ADEQUATE
PMV BLD AUTO: 8.9 FL (ref 9.4–12.4)
POTASSIUM SERPL-SCNC: 3.6 MEQ/L (ref 3.5–5.2)
RBC # BLD: 3.27 MILL/MM3 (ref 4.7–6.1)
SCAN OF BLOOD SMEAR: NORMAL
SEG NEUTROPHILS: 82 %
SEGMENTED NEUTROPHILS ABSOLUTE COUNT: 5.6 THOU/MM3 (ref 1.8–7.7)
SODIUM BLD-SCNC: 137 MEQ/L (ref 135–145)
WBC # BLD: 6.8 THOU/MM3 (ref 4.8–10.8)

## 2019-06-02 PROCEDURE — 85025 COMPLETE CBC W/AUTO DIFF WBC: CPT

## 2019-06-02 PROCEDURE — 80048 BASIC METABOLIC PNL TOTAL CA: CPT

## 2019-06-02 PROCEDURE — 6360000002 HC RX W HCPCS: Performed by: UROLOGY

## 2019-06-02 PROCEDURE — 6370000000 HC RX 637 (ALT 250 FOR IP): Performed by: UROLOGY

## 2019-06-02 PROCEDURE — 1200000000 HC SEMI PRIVATE

## 2019-06-02 PROCEDURE — 2709999900 HC NON-CHARGEABLE SUPPLY

## 2019-06-02 PROCEDURE — 96365 THER/PROPH/DIAG IV INF INIT: CPT

## 2019-06-02 PROCEDURE — 2580000003 HC RX 258: Performed by: UROLOGY

## 2019-06-02 PROCEDURE — 99024 POSTOP FOLLOW-UP VISIT: CPT | Performed by: NURSE PRACTITIONER

## 2019-06-02 PROCEDURE — 36415 COLL VENOUS BLD VENIPUNCTURE: CPT

## 2019-06-02 PROCEDURE — 6370000000 HC RX 637 (ALT 250 FOR IP): Performed by: NURSE PRACTITIONER

## 2019-06-02 RX ADMIN — Medication 10 ML: at 08:52

## 2019-06-02 RX ADMIN — MAGNESIUM HYDROXIDE 30 ML: 400 SUSPENSION ORAL at 11:27

## 2019-06-02 RX ADMIN — AMLODIPINE BESYLATE 10 MG: 10 TABLET ORAL at 08:52

## 2019-06-02 RX ADMIN — Medication 10 ML: at 20:53

## 2019-06-02 RX ADMIN — LEVOTHYROXINE SODIUM 100 MCG: 100 TABLET ORAL at 06:32

## 2019-06-02 RX ADMIN — VANCOMYCIN HYDROCHLORIDE 1250 MG: 5 INJECTION, POWDER, LYOPHILIZED, FOR SOLUTION INTRAVENOUS at 20:53

## 2019-06-02 ASSESSMENT — PAIN SCALES - GENERAL: PAINLEVEL_OUTOF10: 0

## 2019-06-02 NOTE — PROGRESS NOTES
Urology Progress Note    Chief Complaint: Gross hematuria    Subjective:     Pt up in chair. Oriented to person, place, time but at times can't remember details of why he is here. Urine has resulted with YANNICK. Had fevers yesterday still up to 102.5. Temp this am 98.2. Denies n/v. Tolerating diet. Passed flatus yesterday. No BM yet. Refused suppository yesterday. No chest pain or shortness of breath. Vitals:  BP (!) 176/85   Pulse 98   Temp 98.2 °F (36.8 °C) (Oral)   Resp 18   Ht 6' (1.829 m)   Wt 184 lb 11.2 oz (83.8 kg)   SpO2 97%   BMI 25.05 kg/m²   Temp  Av.3 °F (37.4 °C)  Min: 96.4 °F (35.8 °C)  Max: 102.5 °F (39.2 °C)    Intake/Output Summary (Last 24 hours) at 2019 1050  Last data filed at 2019 195  Gross per 24 hour   Intake 3212 ml   Output 1300 ml   Net 1912 ml       Social History     Socioeconomic History    Marital status:       Spouse name: Not on file    Number of children: Not on file    Years of education: Not on file    Highest education level: Not on file   Occupational History    Not on file   Social Needs    Financial resource strain: Not on file    Food insecurity:     Worry: Not on file     Inability: Not on file    Transportation needs:     Medical: Not on file     Non-medical: Not on file   Tobacco Use    Smoking status: Never Smoker    Smokeless tobacco: Never Used   Substance and Sexual Activity    Alcohol use: Yes     Comment: Rarely    Drug use: No    Sexual activity: Not on file   Lifestyle    Physical activity:     Days per week: Not on file     Minutes per session: Not on file    Stress: Not on file   Relationships    Social connections:     Talks on phone: Not on file     Gets together: Not on file     Attends Anabaptism service: Not on file     Active member of club or organization: Not on file     Attends meetings of clubs or organizations: Not on file     Relationship status: Not on file    Intimate partner violence: Fear of current or ex partner: Not on file     Emotionally abused: Not on file     Physically abused: Not on file     Forced sexual activity: Not on file   Other Topics Concern    Not on file   Social History Narrative    Not on file     Family History   Problem Relation Age of Onset    Cancer Mother 80    Stroke Mother     High Blood Pressure Mother     High Blood Pressure Father      Allergies   Allergen Reactions    Penicillins      Unsure-hasn't taken in over 40 yrs. Constitutional: Alert and oriented times x3, no acute distress, and cooperative to examination with appropriate mood and affect. HEENT:   Head:         Normocephalic and atraumatic. Mucous membranes are normal.   Eyes:         EOM are normal. No scleral icterus. Nose:    The external appearance of the nose is normal  Ears: The ears appear normal to external inspection. Cardiovascular:       Normal rate, regular rhythm. Pulmonary/Chest:  Normal respiratory rate and rhthym. No use of accessory muscles. Lungs clear bilaterally. Abdominal:          Soft. No tenderness. Active bowel sounds. Genitalia:    Mirza catheter draining brandon, clear colored urine. Musculoskeletal:    Normal range of motion. He exhibits no edema or tenderness of lower extremities. Extremities:    No cyanosis, clubbing, or edema present. Neurological:    Alert and oriented.      Labs:  WBC:    Lab Results   Component Value Date    WBC 6.8 06/02/2019     Hemoglobin/Hematocrit:    Lab Results   Component Value Date    HGB 11.0 06/02/2019    HCT 32.8 06/02/2019     BMP:    Lab Results   Component Value Date     06/02/2019    K 3.6 06/02/2019    K 4.0 06/01/2019     06/02/2019    CO2 21 06/02/2019    BUN 23 06/02/2019    LABALBU 3.4 06/01/2019    CREATININE 1.2 06/02/2019    CALCIUM 8.5 06/02/2019    LABGLOM 57 06/02/2019       Impression:  Gross hematuria  MRSE UTI  Leukocytosis  Fever  S/p TURBN and TURP, removal of bladder stones, and injection of Clarix Flow by Dr. Kaity Chowdary 5/8/19  Essential HTN, uncontrolled. Prostate cancer  Hx of bladder stones      Plan:    POD # 2 cystoscopy, evacuation of blood clots, fulguration of bleeding site, insertion of catheter, difficult by Dr. Karen Perdomo 6/1/19. EBL 50 mls. Postop findings noted blunt trauma with false passages in the bulbar urethra. MRSE UTI. On Vanc. Fever improving. Consult ID for assistance with antibiotic recs for d/c. Monitor BP--up this am.  On Norvasc. If continues to be elevated will consult hospitalist.    MOM now. Advance activity--ambulate in [place]. IS.       Dena Fischer, APRN-CNP  06/02/19 10:50 AM  Urology

## 2019-06-03 ENCOUNTER — TELEPHONE (OUTPATIENT)
Dept: UROLOGY | Age: 84
End: 2019-06-03

## 2019-06-03 ENCOUNTER — TELEPHONE (OUTPATIENT)
Dept: FAMILY MEDICINE CLINIC | Age: 84
End: 2019-06-03

## 2019-06-03 VITALS
OXYGEN SATURATION: 99 % | RESPIRATION RATE: 17 BRPM | DIASTOLIC BLOOD PRESSURE: 83 MMHG | HEART RATE: 86 BPM | WEIGHT: 184.7 LBS | SYSTOLIC BLOOD PRESSURE: 119 MMHG | TEMPERATURE: 97.9 F | BODY MASS INDEX: 25.02 KG/M2 | HEIGHT: 72 IN

## 2019-06-03 LAB
ORGANISM: ABNORMAL
URINE CULTURE REFLEX: ABNORMAL

## 2019-06-03 PROCEDURE — 6370000000 HC RX 637 (ALT 250 FOR IP): Performed by: UROLOGY

## 2019-06-03 PROCEDURE — 99024 POSTOP FOLLOW-UP VISIT: CPT | Performed by: NURSE PRACTITIONER

## 2019-06-03 PROCEDURE — 51798 US URINE CAPACITY MEASURE: CPT

## 2019-06-03 RX ORDER — TETRACYCLINE HYDROCHLORIDE 500 MG/1
500 CAPSULE ORAL 2 TIMES DAILY
Qty: 28 CAPSULE | Refills: 0 | Status: SHIPPED | OUTPATIENT
Start: 2019-06-03 | End: 2019-06-17

## 2019-06-03 RX ORDER — LINEZOLID 600 MG/1
600 TABLET, FILM COATED ORAL 2 TIMES DAILY
Qty: 28 TABLET | Refills: 0 | Status: SHIPPED | OUTPATIENT
Start: 2019-06-03 | End: 2019-06-03 | Stop reason: HOSPADM

## 2019-06-03 RX ADMIN — LEVOTHYROXINE SODIUM 100 MCG: 100 TABLET ORAL at 06:45

## 2019-06-03 RX ADMIN — AMLODIPINE BESYLATE 10 MG: 10 TABLET ORAL at 07:41

## 2019-06-03 ASSESSMENT — PAIN SCALES - GENERAL: PAINLEVEL_OUTOF10: 0

## 2019-06-03 NOTE — PROGRESS NOTES
Discharge teaching and instructions for diagnosis/procedure of uti and urinary retnetion completed with patient using teachback method. AVS reviewed. Printed prescriptions given to patient. Patient voiced understanding regarding prescriptions, follow up appointments, and care of self at home.  Discharged in a wheelchair to  home with support per family

## 2019-06-03 NOTE — PROGRESS NOTES
Dr Edgar Breaux paged at [phone] to notify of consult through prefect serve. Dr Ramirez Ban back at [phone] and was aware of consult.

## 2019-06-03 NOTE — FLOWSHEET NOTE
Updated: afebrile, hypertension, up ambulating, bleeding around catheter and urine pink tinged after ambulation, some confusion (did not know why he was in the hospital). Informed that Dr. Shena Crandall was not on call this weekend.

## 2019-06-03 NOTE — DISCHARGE SUMMARY
Patient Identification  Tyra Pike is a 80 y.o. male. :  1926  Admit Date:  2019    Discharge date: 19                                    Disposition: home    Discharge Diagnoses:   Patient Active Problem List   Diagnosis    Bladder outflow obstruction    Prostate cancer ([facility] [address]  recurrent    Essential hypertension    Chronic kidney disease, stage IV (severe) (HCC)    Acute cystitis with hematuria    Orthostatic hypotension    VADIM (acute kidney injury) ([facility] [address]    Leukocytosis    Hypokalemia    History of prostate cancer    Normocytic anemia    Unilateral inguinal hernia without obstruction or gangrene    BPH with obstruction/lower urinary tract symptoms    Bladder stones    Bladder neck contracture    Gross hematuria    Acute delirium    S/P TURP       Consults: none    Surgery: cystoscopy, evacuation of blood clots, fulguration of bleeding site, insertion of catheter, difficult by Dr. Orville Ferguson 19.  EBL 50 mls.          Patient Instructions: Activity: no heavy lifting, pushing, pulling for 6 weeks, no driving while on analgesics. Diet: As tolerated  Follow-up for PVR in 1 week. Hospital course: Patient underwent cystoscopy, evacuation of blood clots, fulguration of bleeding site, insertion of catheter with no complications. Postop findings noted blunt trauma with false passages in the bulbar urethra.       Post-operatively Mitch remained stable. Patient is tolerating diet, urinating adequately on his own, pain is minimal, ambulating well with assistance, having flatus and BM. YANNICK in urine, discussed with Dr. Neeta Roche. Will treat with Tetracycline x14 days as Zyvox requires prior authorization. He was afebrile at discharge and stable.      Time spent for discharge 28 minutes

## 2019-06-03 NOTE — CARE COORDINATION
6/3/19, 8:06 AM      [address]       Admitted from: OR  5/31/2019/ [address] day: 2   Location: [address] [place] [address]A Reason for admit: Gross hematuria [R31.0]  Gross hematuria [R31.0] Status: Inpatient  Admit order signed?: yes  PMH:  has a past medical history of Cancer ([address] Hyperlipidemia, Hypertension, Kidney stone, and Thyroid disease. Procedure: 05/31/19  By Dr Munir Escoto  1. Cystoscopy. 2.  Evacuation of blood clots. 3.  Filtration of bleeding site. 4.  Insertion of catheter, difficult. Pertinent abnormal Imaging:na  Medications:  Scheduled Meds:   bisacodyl  10 mg Rectal Once    vancomycin (VANCOCIN) intermittent dosing (placeholder)   Other RX Placeholder    vancomycin  1,250 mg Intravenous Q24H    sodium chloride flush  10 mL Intravenous 2 times per day    amLODIPine  10 mg Oral Daily    levothyroxine  100 mcg Oral Daily     Continuous Infusions:   Pertinent Info/Orders/Treatment Plan:  IV Vancomycin, Dulcolax supp, pain and nausea control, I/O, collazo catheter    Diet: DIET GENERAL;   Smoking status:  reports that he has never smoked. He has never used smokeless tobacco.   PCP: OLGA Hook CNP  Readmission: yes    Patient has been readmitted within  19  days. Patient went to f/u appointment? yes  If yes, was it within 7 days? yes  Patient was able to fill prescriptions?  yes  Patient is taking medications as prescribed? yes  Cause for readmission? surgery    Readmission Risk Score: 17%    Discharge Planning  Current Residence:  Private Residence  Living Arrangements: home with son  Support Systems:  Children  Current Services PTA:   none  Potential Assistance Needed:  N/A  Potential Assistance Purchasing Medications:  No  Does patient want to participate in local refill/ meds to beds program?  No  Type of Home Care Services:  None  Patient expects to be discharged to:  home  Expected Discharge date:  06/01/19  Follow Up Appointment: Best Day/ Time: Monday AM    Discharge Plan:

## 2019-06-03 NOTE — PROGRESS NOTES
mike notified that bladder scan showed zero. mike states pt can go home and to follow up in 5-7 days and to  script after 10am tomorrow from our out pt pharmacy.

## 2019-06-03 NOTE — PROGRESS NOTES
Urology Progress Note    Chief Complaint: gross hematuria    Subjective:     Patient is resting in chair, collazo draining yellow urine, +flatus, -BM, ambulating with assistance, tolerating regular diet, denies any nausea or vomiting. There are complaints of no pain at this time. Patient is ready for discharge. He denies any pain, fever or chills. He denies any abdominal bloating or distention. Vitals:  BP (!) 165/76   Pulse 82   Temp 97.2 °F (36.2 °C) (Oral)   Resp 16   Ht 6' (1.829 m)   Wt 184 lb 11.2 oz (83.8 kg)   SpO2 96%   BMI 25.05 kg/m²   Temp  Av.6 °F (36.4 °C)  Min: 96.7 °F (35.9 °C)  Max: 99.3 °F (37.4 °C)    Intake/Output Summary (Last 24 hours) at 6/3/2019 1012  Last data filed at 6/3/2019 0530  Gross per 24 hour   Intake 1400 ml   Output 1100 ml   Net 300 ml       Social History     Socioeconomic History    Marital status:       Spouse name: Not on file    Number of children: Not on file    Years of education: Not on file    Highest education level: Not on file   Occupational History    Not on file   Social Needs    Financial resource strain: Not on file    Food insecurity:     Worry: Not on file     Inability: Not on file    Transportation needs:     Medical: Not on file     Non-medical: Not on file   Tobacco Use    Smoking status: Never Smoker    Smokeless tobacco: Never Used   Substance and Sexual Activity    Alcohol use: Yes     Comment: Rarely    Drug use: No    Sexual activity: Not on file   Lifestyle    Physical activity:     Days per week: Not on file     Minutes per session: Not on file    Stress: Not on file   Relationships    Social connections:     Talks on phone: Not on file     Gets together: Not on file     Attends Buddhism service: Not on file     Active member of club or organization: Not on file     Attends meetings of clubs or organizations: Not on file     Relationship status: Not on file    Intimate partner violence:     Fear of current or ex partner: Not on file     Emotionally abused: Not on file     Physically abused: Not on file     Forced sexual activity: Not on file   Other Topics Concern    Not on file   Social History Narrative    Not on file     Family History   Problem Relation Age of Onset    Cancer Mother 80    Stroke Mother     High Blood Pressure Mother     High Blood Pressure Father      Allergies   Allergen Reactions    Penicillins      Unsure-hasn't taken in over 40 yrs. Constitutional: Alert and oriented times x3, no acute distress, and cooperative to examination with appropriate mood and affect. HEENT:   Head:         Normocephalic and atraumatic. Mucous membranes are normal.   Eyes:         EOM are normal. No scleral icterus. Nose:    The external appearance of the nose is normal  Ears: The ears appear normal to external inspection. Cardiovascular:       Normal rate, regular rhythm. Pulmonary/Chest:  Normal respiratory rate and rhthym. No use of accessory muscles. Lungs clear bilaterally. Abdominal:          Soft. No tenderness. Active bowel sounds. Genitalia:    Mirza catheter draining yellow urine  Musculoskeletal:    Normal range of motion. He exhibits no edema or tenderness of lower extremities. Extremities:    No cyanosis, clubbing, or edema present. Neurological:    Alert and oriented.      Labs:  WBC:    Lab Results   Component Value Date    WBC 6.8 06/02/2019     Hemoglobin/Hematocrit:    Lab Results   Component Value Date    HGB 11.0 06/02/2019    HCT 32.8 06/02/2019     BMP:    Lab Results   Component Value Date     06/02/2019    K 3.6 06/02/2019    K 4.0 06/01/2019     06/02/2019    CO2 21 06/02/2019    BUN 23 06/02/2019    LABALBU 3.4 06/01/2019    CREATININE 1.2 06/02/2019    CALCIUM 8.5 06/02/2019    LABGLOM 57 06/02/2019     Organism Abnormal  05/31/2019  9:21 AM [facility]   Staphylococcus epidermidis    Urine Culture Reflex 05/31/2019 tetracycline, Zyvox needs prior authorization.      OLGA Valentine  06/03/19 10:12 AM  Urology

## 2019-06-04 NOTE — TELEPHONE ENCOUNTER
Franci [facility] Transitions Initial Follow Up Call    Outreach made within 2 business days of discharge: Yes    Patient: Lesa Roa Patient : 1926   MRN: [id number]  Reason for Admission: There are no discharge diagnoses documented for the most recent discharge. Discharge Date: 6/3/19       Spoke with: Attempted to contact pt. Home number is not a working number and cell number didn't have a VM that was set up. Discharge department/facility: [facility]    TCM Interactive Patient Contact:  Was patient able to fill all prescriptions: No: n/a  Was patient instructed to bring all medications to the follow-up visit: No: n/a  Is patient taking all medications as directed in the discharge summary?  No  Does patient understand their discharge instructions: No: n/a  Does patient have questions or concerns that need addressed prior to 7-14 day follow up office visit: no    Scheduled appointment with PCP within 7-14 days    Follow Up  Future Appointments   Date Time Provider Bernadette Duffy   6/10/2019  1:00 PM Priscilla Mirza APRN - [address] [address]   2019  1:00 PM Candido Linton Urology [facility] [address] [place] [address]   2019  1:45 PM Candido Linton Urology [facility] - [address] ([address] [place] [address])

## 2019-06-05 LAB
BLOOD CULTURE, ROUTINE: NORMAL
BLOOD CULTURE, ROUTINE: NORMAL

## 2019-06-05 NOTE — TELEPHONE ENCOUNTER
Franci [facility] Transitions Initial Follow Up Call    Outreach made within 2 business days of discharge: Yes    Patient: Mercedes Camargo Patient : 1926   MRN: [id number]  Reason for Admission: There are no discharge diagnoses documented for the most recent discharge. Discharge Date: 6/3/19       Spoke with: patient    Discharge department/facility: [facility]    TCM Interactive Patient Contact:  Was patient able to fill all prescriptions: Yes  Was patient instructed to bring all medications to the follow-up visit: Yes  Is patient taking all medications as directed in the discharge summary?  Yes  Does patient understand their discharge instructions: Yes  Does patient have questions or concerns that need addressed prior to 7-14 day follow up office visit: no    Scheduled appointment with PCP within 7-14 days    Follow Up  Future Appointments   Date Time Provider Bernadette Duffy   6/10/2019  1:00 PM OLGA Dietz - [address] [address] - [address] [facility]   2019  1:00 PM Candido Mann [facility]   2019  1:45 PM Candido Mann Urology [facility] - [place], LPN

## 2019-06-10 ENCOUNTER — OFFICE VISIT (OUTPATIENT)
Dept: FAMILY MEDICINE CLINIC | Age: 84
End: 2019-06-10
Payer: MEDICARE

## 2019-06-10 VITALS
DIASTOLIC BLOOD PRESSURE: 58 MMHG | HEART RATE: 83 BPM | RESPIRATION RATE: 12 BRPM | TEMPERATURE: 97.6 F | BODY MASS INDEX: 24.68 KG/M2 | WEIGHT: 182 LBS | SYSTOLIC BLOOD PRESSURE: 134 MMHG

## 2019-06-10 DIAGNOSIS — Z09 HOSPITAL DISCHARGE FOLLOW-UP: Primary | ICD-10-CM

## 2019-06-10 DIAGNOSIS — R31.0 GROSS HEMATURIA: ICD-10-CM

## 2019-06-10 DIAGNOSIS — N30.01 ACUTE CYSTITIS WITH HEMATURIA: ICD-10-CM

## 2019-06-10 PROCEDURE — 99215 OFFICE O/P EST HI 40 MIN: CPT | Performed by: NURSE PRACTITIONER

## 2019-06-10 PROCEDURE — 1111F DSCHRG MED/CURRENT MED MERGE: CPT | Performed by: NURSE PRACTITIONER

## 2019-06-10 NOTE — PROGRESS NOTES
Visit Information    Have you changed or started any medications since your last visit including any over-the-counter medicines, vitamins, or herbal medicines? no   Are you having any side effects from any of your medications? -  no  Have you stopped taking any of your medications? Is so, why? -  no    Have you seen any other physician or provider since your last visit? No  Have you had any other diagnostic tests since your last visit? No  Have you been seen in the emergency room and/or had an admission to a hospital since we last saw you? No  Have you had your routine dental cleaning in the past 6 months? no    Have you activated your [employer] account? If not, what are your barriers?  Yes     Patient Care Team:  OLGA Kay CNP as PCP - General (Family Nurse Practitioner)  OLGA Kay CNP as PCP - [facility]        Health Maintenance   Topic Date Due    Hepatitis B Vaccine (1 of 3 - Risk 3-dose series) 09/21/1945    DTaP/Tdap/Td vaccine (1 - Tdap) 09/21/1945    Shingles Vaccine (1 of 2) 09/21/1976    Pneumococcal 65+ years Vaccine (1 of 2 - PCV13) 09/21/1991    Flu vaccine (Season Ended) 09/01/2019    Potassium monitoring  06/02/2020    Creatinine monitoring  06/02/2020    HPV vaccine  Aged Out

## 2019-06-10 NOTE — PROGRESS NOTES
Post-Discharge Transitional Care Management Services or Hospital Follow Up      [address]   YOB: 1926    Date of Office Visit:  6/10/2019  Date of Hospital Admission: 5/31/19  Date of Hospital Discharge: 6/3/19  Risk of hospital readmission (high >=14%. Medium >=10%) :Readmission Risk Score: 17      Care management risk score Rising risk (score 2-5) and Complex Care (Scores >=6): 1     Non face to face  following discharge, date last encounter closed (first attempt may have been earlier): 6/5/2019  9:20 AM    Call initiated 2 business days of discharge: Yes    Patient Active Problem List   Diagnosis    Bladder outflow obstruction    Prostate cancer ([facility] [address]  recurrent    Essential hypertension    Chronic kidney disease, stage IV (severe) ([facility] [address]    Acute cystitis with hematuria    Orthostatic hypotension    VADIM (acute kidney injury) ([facility] [address]    Leukocytosis    Hypokalemia    History of prostate cancer    Normocytic anemia    Unilateral inguinal hernia without obstruction or gangrene    BPH with obstruction/lower urinary tract symptoms    Bladder stones    Bladder neck contracture    Gross hematuria    Acute delirium    S/P TURP       Allergies   Allergen Reactions    Penicillins      Unsure-hasn't taken in over 40 yrs.        Medications listed as ordered at the time of discharge from hospital   [address] Medication Instructions TOPHER:    Printed on:06/10/19 1321   Medication Information                      amLODIPine (NORVASC) 10 MG tablet  Take 1 tablet by mouth daily             levothyroxine (SYNTHROID) 100 MCG tablet  Take 1 tablet by mouth Daily             tetracycline (ACHROMYCIN;SUMYCIN) 500 MG capsule  Take 1 capsule by mouth 2 times daily for 14 days                   Medications marked \"taking\" at this time  Outpatient Medications Marked as Taking for the 6/10/19 encounter (Office Visit) with OLGA Ibarra CNP   Medication Sig Dispense Refill   Raymond Echols tetracycline (ACHROMYCIN;SUMYCIN) 500 MG capsule Take 1 capsule by mouth 2 times daily for 14 days 28 capsule 0    amLODIPine (NORVASC) 10 MG tablet Take 1 tablet by mouth daily 90 tablet 1    levothyroxine (SYNTHROID) 100 MCG tablet Take 1 tablet by mouth Daily 90 tablet 1        Medications patient taking as of now reconciled against medications ordered at time of hospital discharge: Yes    Chief Complaint   Patient presents with    Follow-Up from Hospital     pt is doing much better       History of Present illness - Follow up of Hospital diagnosis(es): Hematuria, UTI    Inpatient course: Discharge summary reviewed- see chart. cystoscopy, evacuation of blood clots, fulguration of bleeding site, insertion of catheter, difficult by Dr. Ashleigh Wilhelm 6/1/19.  EBL 50 mls.     Hospital course: Patient underwent cystoscopy, evacuation of blood clots, fulguration of bleeding site, insertion of catheter with no complications. Postop findings noted blunt trauma with false passages in the bulbar urethra.        Post-operatively Delbra Ape remained stable. Patient is tolerating diet, urinating adequately on his own, pain is minimal, ambulating well with assistance, having flatus and BM.       MRSE in urine, discussed with Dr. Servando Vinson. Will treat with Tetracycline x14 days as Zyvox requires prior authorization. He was afebrile at discharge and stable.      Time spent for discharge 28 minutes      Interval history/Current status: good    A comprehensive review of systems was negative. Vitals:    06/10/19 1255   BP: (!) 134/58   Pulse: 83   Resp: 12   Temp: 97.6 °F (36.4 °C)   TempSrc: Oral   Weight: 182 lb (82.6 kg)     Body mass index is 24.68 kg/m².    Wt Readings from Last 3 Encounters:   06/10/19 182 lb (82.6 kg)   05/31/19 184 lb 11.2 oz (83.8 kg)   05/20/19 182 lb (82.6 kg)     BP Readings from Last 3 Encounters:   06/10/19 (!) 134/58   06/03/19 119/83   05/31/19 (!) 147/66        Physical Exam:  Pulmonary/Chest: clear to auscultation bilaterally- no wheezes, rales or rhonchi, normal air movement, no respiratory distress  Cardiovascular: normal rate, normal S1 and S2, no gallops, intact distal pulses and no carotid bruits  Abdomen: soft, non-tender, non-distended, normal bowel sounds, no masses or organomegaly  Assessment/Plan:  1. Hospital discharge follow-up    - [place] DISCHARGE MEDS RECONCILED W/ CURRENT OUTPATIENT MED LIST    2. Gross hematuria    - [place] DISCHARGE MEDS RECONCILED W/ CURRENT OUTPATIENT MED LIST    3. Acute cystitis with hematuria  Continue f/u with Urology, has  A repeat urine test scheduled for tomorrow. Son accompanied pt and asked about parameters in the future when to go to ER. Advised if no urine in 8-10  Hours to report to ER.  They voiced understanding.   - [place] DISCHARGE MEDS RECONCILED W/ CURRENT OUTPATIENT MED LIST        Medical Decision Making: moderate complexity

## 2019-06-11 ENCOUNTER — NURSE ONLY (OUTPATIENT)
Dept: UROLOGY | Age: 84
End: 2019-06-11
Payer: MEDICARE

## 2019-06-11 DIAGNOSIS — N32.0 BLADDER NECK CONTRACTURE: Primary | ICD-10-CM

## 2019-06-11 LAB
BILIRUBIN URINE: NEGATIVE
BLOOD URINE, POC: ABNORMAL
CHARACTER, URINE: CLEAR
COLOR, URINE: YELLOW
GLUCOSE URINE: NEGATIVE MG/DL
KETONES, URINE: NEGATIVE
LEUKOCYTE CLUMPS, URINE: ABNORMAL
NITRITE, URINE: NEGATIVE
PH, URINE: 6 (ref 5–9)
POST VOID RESIDUAL (PVR): 17 ML
PROTEIN, URINE: >= 300 MG/DL
SPECIFIC GRAVITY, URINE: >= 1.03 (ref 1–1.03)
UROBILINOGEN, URINE: 0.2 EU/DL (ref 0–1)

## 2019-06-11 PROCEDURE — 51798 US URINE CAPACITY MEASURE: CPT | Performed by: NURSE PRACTITIONER

## 2019-06-11 PROCEDURE — 81003 URINALYSIS AUTO W/O SCOPE: CPT | Performed by: NURSE PRACTITIONER

## 2019-06-11 NOTE — PROGRESS NOTES
Patient is in for PVR check 1 week post hospital check. Patient's PVR is 17 mL. Per Jerson ESPINOZA CNP patient is to follow up as scheduled and call if anything happens between now and appointment.

## 2019-07-09 ENCOUNTER — OFFICE VISIT (OUTPATIENT)
Dept: UROLOGY | Age: 84
End: 2019-07-09
Payer: MEDICARE

## 2019-07-09 VITALS — HEIGHT: 72 IN | WEIGHT: 185 LBS | BODY MASS INDEX: 25.06 KG/M2

## 2019-07-09 DIAGNOSIS — R31.9 URINARY TRACT INFECTION WITH HEMATURIA, SITE UNSPECIFIED: ICD-10-CM

## 2019-07-09 DIAGNOSIS — N39.0 URINARY TRACT INFECTION WITH HEMATURIA, SITE UNSPECIFIED: ICD-10-CM

## 2019-07-09 DIAGNOSIS — N32.0 BLADDER NECK CONTRACTURE: Primary | ICD-10-CM

## 2019-07-09 DIAGNOSIS — C61 PROSTATE CANCER (HCC): ICD-10-CM

## 2019-07-09 LAB
BILIRUBIN URINE: ABNORMAL
BLOOD URINE, POC: ABNORMAL
CHARACTER, URINE: CLEAR
COLOR, URINE: YELLOW
GLUCOSE URINE: NEGATIVE MG/DL
KETONES, URINE: ABNORMAL
LEUKOCYTE CLUMPS, URINE: ABNORMAL
NITRITE, URINE: NEGATIVE
PH, URINE: 5.5 (ref 5–9)
POST VOID RESIDUAL (PVR): 48 ML
PROTEIN, URINE: >= 300 MG/DL
SPECIFIC GRAVITY, URINE: >= 1.03 (ref 1–1.03)
UROBILINOGEN, URINE: 0.2 EU/DL (ref 0–1)

## 2019-07-09 PROCEDURE — 4040F PNEUMOC VAC/ADMIN/RCVD: CPT | Performed by: NURSE PRACTITIONER

## 2019-07-09 PROCEDURE — G8427 DOCREV CUR MEDS BY ELIG CLIN: HCPCS | Performed by: NURSE PRACTITIONER

## 2019-07-09 PROCEDURE — 1123F ACP DISCUSS/DSCN MKR DOCD: CPT | Performed by: NURSE PRACTITIONER

## 2019-07-09 PROCEDURE — G8419 CALC BMI OUT NRM PARAM NOF/U: HCPCS | Performed by: NURSE PRACTITIONER

## 2019-07-09 PROCEDURE — 99024 POSTOP FOLLOW-UP VISIT: CPT | Performed by: NURSE PRACTITIONER

## 2019-07-09 PROCEDURE — 1036F TOBACCO NON-USER: CPT | Performed by: NURSE PRACTITIONER

## 2019-07-09 PROCEDURE — 51798 US URINE CAPACITY MEASURE: CPT | Performed by: NURSE PRACTITIONER

## 2019-07-09 PROCEDURE — 81003 URINALYSIS AUTO W/O SCOPE: CPT | Performed by: NURSE PRACTITIONER

## 2019-07-09 NOTE — PROGRESS NOTES
[address] [place] [address],[place] [address] [id number] [place] [address] Mercedes Bolden  Dept: [phone]  Loc: [phone]  Visit Date: 7/9/2019      HPI:     Pita Reason f/u today for Hx Bladder Neck Contracture, Prostate Cancer. He underwent cystoscopy, evacuation of clots and fulguration of bleeding sites with Dr. Dayne Almeida on 05/31/19 in the interim. He has been doing okay, reports occasional urinary incontinence during nighttime. Reports stream is okay, denies any splitting or spraying. He is emptying bladder well, PVR is 48 mL. He did have recent infection a few weeks ago, urinalysis shows large blood with trace leukocytes. Hx of TURBN and TURP using bipolar loop, removal of bladder stones, injection of Clarix Flow w/  on 05/08/19. Surgical pathology consistent with [place] score 4+5 = 9, grade group 5 prostate cancer, tumor volume 37%. Hx TURBN and TURP revision using bipolar loop, cystolitholapaxy large w/  on 02/05/19. Findings: bladder neck opened and prostate re-resected for regrowth, large stones broken and removed. 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 Urothelial mucosa with acute and chronic inflammation and reactive atypia. In the past, he underwent TURP using bipolar loop per Dr. Qiana Zhang following right inguinal hernia repair per Dr. Rene Adhikari. Pathology showed [place] 5+4=9 prostate cancer with tumor volume of 62%. Hx of prostate cancer and was treated with external beam radiation therapy in 2000. PSA on 1/15/17 was 2.49 and then on 1/31/17 it was 4.73. A bone scan was negative for metastasis. Patient has chosen active surveillance with intermittent androgen deprivation therapy for treatment. Trend of PSA:  1.16 12/2018  1.05 05/2018  1.14 11/13/17  0.99 07/2017  He comes in with his son. Hx is obtained from the patient and the medical record.      Current Abdominal:         Soft. No tenderness. Bowel sounds present. Musculoskeletal:         Normal range of motion. No edema or tenderness of lower extremities. Extremities: No cyanosis, clubbing, or edema present. Neurological:        Alert and oriented. No cranial nerve deficit. Carlynn Traci Psychiatric:        Normal mood and affect. Labs   Urine dip demonstrates   Results for POC orders placed in visit on 07/09/19   POCT Urinalysis No Micro (Auto)   Result Value Ref Range    Glucose, Ur Negative NEGATIVE mg/dl    Bilirubin Urine Small (A)     Ketones, Urine Trace (A) NEGATIVE    Specific Gravity, Urine >= 1.030 1.002 - 1.03    Blood, UA POC Large (A) NEGATIVE    pH, Urine 5.50 5.0 - 9.0    Protein, Urine >= 300 (A) NEGATIVE mg/dl    Urobilinogen, Urine 0.20 0.0 - 1.0 eu/dl    Nitrite, Urine Negative NEGATIVE    Leukocyte Clumps, Urine Trace (A) NEGATIVE    Color, Urine Yellow YELLOW-STR    Character, Urine Clear [id number]   poct post void residual   Result Value Ref Range    post void residual 48 ml       Patients recent PSA values are as follows  Lab Results   Component Value Date    PSA 0.90 03/25/2019    PSA 1.16 (H) 12/01/2018    PSA 1.05 (H) 05/01/2018        Recent BUN/Creatinine:  Lab Results   Component Value Date    BUN 23 06/02/2019    CREATININE 1.2 06/02/2019       Radiology  No recent imaging       Assessment & Plan:     Prostate Cancer  Bladder Neck Contracture  Urinary Incontinence    Hx Bladder Calculi  UTI  Recurrent UTI     Demond Hemphill f/u today for Prostate Cancer, bladder neck contracture. He is doing relatively well, only complains of incontinence during nighttime. He is emptying bladder well with PVR of 48 mL. We will send urine for culture, treat accordingly. Return in about 6 months (around 1/9/2020) for Bladder neck contracture .     OLGA Mcdonnell  Urology

## 2019-07-10 LAB
ORGANISM: ABNORMAL
URINE CULTURE, ROUTINE: ABNORMAL

## 2019-07-19 PROBLEM — E03.9 HYPOTHYROIDISM: Status: ACTIVE | Noted: 2019-07-19

## 2019-10-15 DIAGNOSIS — E03.9 HYPOTHYROIDISM, UNSPECIFIED TYPE: ICD-10-CM

## 2019-10-15 RX ORDER — LEVOTHYROXINE SODIUM 0.1 MG/1
100 TABLET ORAL DAILY
Qty: 90 TABLET | Refills: 1 | Status: SHIPPED | OUTPATIENT
Start: 2019-10-15 | End: 2020-01-18 | Stop reason: SDUPTHER

## 2019-10-15 RX ORDER — AMLODIPINE BESYLATE 10 MG/1
10 TABLET ORAL DAILY
Qty: 90 TABLET | Refills: 1 | Status: SHIPPED | OUTPATIENT
Start: 2019-10-15 | End: 2020-03-30 | Stop reason: SDUPTHER

## 2019-10-16 ENCOUNTER — TELEPHONE (OUTPATIENT)
Dept: FAMILY MEDICINE CLINIC | Age: 84
End: 2019-10-16

## 2019-10-16 DIAGNOSIS — E03.9 HYPOTHYROIDISM, UNSPECIFIED TYPE: ICD-10-CM

## 2020-01-13 ENCOUNTER — OFFICE VISIT (OUTPATIENT)
Dept: FAMILY MEDICINE CLINIC | Age: 85
End: 2020-01-13
Payer: MEDICARE

## 2020-01-13 ENCOUNTER — NURSE ONLY (OUTPATIENT)
Dept: LAB | Age: 85
End: 2020-01-13

## 2020-01-13 VITALS
WEIGHT: 192 LBS | SYSTOLIC BLOOD PRESSURE: 136 MMHG | RESPIRATION RATE: 10 BRPM | HEART RATE: 100 BPM | TEMPERATURE: 98.3 F | DIASTOLIC BLOOD PRESSURE: 80 MMHG | BODY MASS INDEX: 26.88 KG/M2 | HEIGHT: 71 IN

## 2020-01-13 LAB
AVERAGE GLUCOSE: 93 MG/DL (ref 70–126)
BILIRUBIN, POC: NORMAL
BLOOD URINE, POC: NORMAL
CLARITY, POC: NORMAL
COLOR, POC: YELLOW
GLUCOSE URINE, POC: NEGATIVE
HBA1C MFR BLD: 5.1 % (ref 4.4–6.4)
KETONES, POC: NEGATIVE
LEUKOCYTE EST, POC: NEGATIVE
NITRITE, POC: NEGATIVE
PH, POC: 66
PROTEIN, POC: 100
SPECIFIC GRAVITY, POC: >=1.03
T4 FREE: 0.8 NG/DL (ref 0.93–1.76)
TSH SERPL DL<=0.05 MIU/L-ACNC: 64.91 UIU/ML (ref 0.4–4.2)
UROBILINOGEN, POC: 0.2

## 2020-01-13 PROCEDURE — G8484 FLU IMMUNIZE NO ADMIN: HCPCS | Performed by: NURSE PRACTITIONER

## 2020-01-13 PROCEDURE — 4040F PNEUMOC VAC/ADMIN/RCVD: CPT | Performed by: NURSE PRACTITIONER

## 2020-01-13 PROCEDURE — 99213 OFFICE O/P EST LOW 20 MIN: CPT | Performed by: NURSE PRACTITIONER

## 2020-01-13 PROCEDURE — G8427 DOCREV CUR MEDS BY ELIG CLIN: HCPCS | Performed by: NURSE PRACTITIONER

## 2020-01-13 PROCEDURE — 81002 URINALYSIS NONAUTO W/O SCOPE: CPT | Performed by: NURSE PRACTITIONER

## 2020-01-13 PROCEDURE — 1036F TOBACCO NON-USER: CPT | Performed by: NURSE PRACTITIONER

## 2020-01-13 PROCEDURE — G8417 CALC BMI ABV UP PARAM F/U: HCPCS | Performed by: NURSE PRACTITIONER

## 2020-01-13 PROCEDURE — 1123F ACP DISCUSS/DSCN MKR DOCD: CPT | Performed by: NURSE PRACTITIONER

## 2020-01-13 ASSESSMENT — PATIENT HEALTH QUESTIONNAIRE - PHQ9
2. FEELING DOWN, DEPRESSED OR HOPELESS: 0
1. LITTLE INTEREST OR PLEASURE IN DOING THINGS: 0
SUM OF ALL RESPONSES TO PHQ9 QUESTIONS 1 & 2: 0
SUM OF ALL RESPONSES TO PHQ QUESTIONS 1-9: 0
SUM OF ALL RESPONSES TO PHQ QUESTIONS 1-9: 0

## 2020-01-13 ASSESSMENT — ENCOUNTER SYMPTOMS: RESPIRATORY NEGATIVE: 1

## 2020-01-14 ENCOUNTER — TELEPHONE (OUTPATIENT)
Dept: FAMILY MEDICINE CLINIC | Age: 85
End: 2020-01-14

## 2020-01-14 NOTE — LETTER
[address] [facility]  [address] [place] [address]  Phone: [phone]  Fax: [address], [facility]        January 22, 2020    John1 Chucky Oro  [address] [facility]  [address] [place] [address] [address] [address]      Dear Carmen Valderrama: We have made several attempts to contact you by phone and have   been unsuccessful. Please call our office at your earliest convenience  At [phone] opt [phone]. Thank you.           Sincerely,        Luis Carlos Smith CMA ([facility])

## 2020-01-15 ENCOUNTER — TELEPHONE (OUTPATIENT)
Dept: FAMILY MEDICINE CLINIC | Age: 85
End: 2020-01-15

## 2020-01-15 NOTE — TELEPHONE ENCOUNTER
Spoke with pt's son, he states Maricruz Morales has not been taking his medication as directed in fear of running out. Guy Solis stated he will sit down and have a talk with Zane Bence on how important it is for him to take his medications.

## 2020-01-16 NOTE — TELEPHONE ENCOUNTER
Pt called earlier today or last evening and stated he had ronnie taking the thyroid med most everyday, he has not been off of it for an extended period of time, that is why we went with the 100 mcg tablet, if I go backward to the 50 mcg tablet his numbers will be even worse when we recheck. Or am I not getting accurate info.  Between son and dad? (son stated he didn't think he was taking it routinely, dad says he is?/)

## 2020-01-16 NOTE — TELEPHONE ENCOUNTER
Divya Pizano is stating pt is stating pt did take the synthroid 100 mcg routinely but he just missed a few doses the first couple days before he got his blood drawn. Mary Lou Amaral is saying if Lasha Yeager wants pt to still try the 50mcgout, they are unable to cut the pill in half because its so small and isnt scored and is asking for a new prescription for the 50mcg to be sent [facility] his [facility] on [address]. Please advise.

## 2020-01-18 RX ORDER — LEVOTHYROXINE SODIUM 0.15 MG/1
150 TABLET ORAL DAILY
Qty: 90 TABLET | Refills: 1 | Status: SHIPPED | OUTPATIENT
Start: 2020-01-18 | End: 2020-03-12 | Stop reason: SDUPTHER

## 2020-01-30 NOTE — TELEPHONE ENCOUNTER
Spoke to son, Beckie Felipe. And he said pt started taking the 150mcg of syntroid 2 weeks ago. I informed him to get his labs done in 4 wks since pt has already been on it for 2 wks. I didn't see any follow up labs. Please advise.

## 2020-02-05 ENCOUNTER — HOSPITAL ENCOUNTER (OUTPATIENT)
Dept: GENERAL RADIOLOGY | Age: 85
Discharge: HOME | End: 2020-02-05
Payer: MEDICARE

## 2020-02-05 ENCOUNTER — HOSPITAL ENCOUNTER (OUTPATIENT)
Age: 85
Discharge: HOME | End: 2020-02-05
Payer: MEDICARE

## 2020-02-05 PROCEDURE — 74018 RADEX ABDOMEN 1 VIEW: CPT

## 2020-03-12 ENCOUNTER — TELEPHONE (OUTPATIENT)
Dept: FAMILY MEDICINE CLINIC | Age: 85
End: 2020-03-12

## 2020-03-12 LAB
T4 FREE: 1.62 NG/DL (ref 0.61–1.6)
TSH SERPL DL<=0.05 MIU/L-ACNC: 0.03 UIU/ML (ref 0.49–4.67)

## 2020-03-12 RX ORDER — LEVOTHYROXINE SODIUM 0.1 MG/1
100 TABLET ORAL DAILY
Qty: 90 TABLET | Refills: 1 | Status: SHIPPED | OUTPATIENT
Start: 2020-03-12 | End: 2020-09-14 | Stop reason: SDUPTHER

## 2020-03-12 NOTE — TELEPHONE ENCOUNTER
Let pt and/or son know we will need to decrease his thyroid dose as his levels have gone too low. If currently on 150 mcg will decrease to 100 mcg, I will send a new order and then we will recheck his levels in 6 weeks again.

## 2020-03-30 RX ORDER — AMLODIPINE BESYLATE 10 MG/1
10 TABLET ORAL DAILY
Qty: 90 TABLET | Refills: 1 | Status: SHIPPED | OUTPATIENT
Start: 2020-03-30 | End: 2020-09-14 | Stop reason: SDUPTHER

## 2020-03-30 NOTE — TELEPHONE ENCOUNTER
Everitt Castleman called requesting a refill on the following medications:  Requested Prescriptions     Pending Prescriptions Disp Refills    amLODIPine (NORVASC) 10 MG tablet 90 tablet 1     Sig: Take 1 tablet by mouth daily     [facility]  . pv      Date of last visit: 11/13/20    Date of next visit (if applicable): Visit date not found

## 2020-09-14 RX ORDER — LEVOTHYROXINE SODIUM 0.1 MG/1
100 TABLET ORAL DAILY
Qty: 90 TABLET | Refills: 1 | Status: SHIPPED | OUTPATIENT
Start: 2020-09-14 | End: 2021-07-18

## 2020-09-14 RX ORDER — AMLODIPINE BESYLATE 10 MG/1
10 TABLET ORAL DAILY
Qty: 90 TABLET | Refills: 1 | Status: SHIPPED | OUTPATIENT
Start: 2020-09-14

## 2020-10-03 ENCOUNTER — HOSPITAL ENCOUNTER (OUTPATIENT)
Dept: GENERAL RADIOLOGY | Age: 85
Discharge: HOME | End: 2020-10-03
Payer: MEDICARE

## 2020-10-03 ENCOUNTER — HOSPITAL ENCOUNTER (OUTPATIENT)
Age: 85
Discharge: HOME | End: 2020-10-03
Payer: MEDICARE

## 2020-10-03 PROCEDURE — 74018 RADEX ABDOMEN 1 VIEW: CPT

## 2020-10-08 ENCOUNTER — OFFICE VISIT (OUTPATIENT)
Dept: FAMILY MEDICINE CLINIC | Age: 85
End: 2020-10-08
Payer: MEDICARE

## 2020-10-08 ENCOUNTER — HOSPITAL ENCOUNTER (OUTPATIENT)
Dept: GENERAL RADIOLOGY | Age: 85
Discharge: HOME | End: 2020-10-08
Payer: MEDICARE

## 2020-10-08 ENCOUNTER — TELEPHONE (OUTPATIENT)
Dept: FAMILY MEDICINE CLINIC | Age: 85
End: 2020-10-08

## 2020-10-08 ENCOUNTER — HOSPITAL ENCOUNTER (OUTPATIENT)
Age: 85
Discharge: HOME | End: 2020-10-08
Payer: MEDICARE

## 2020-10-08 VITALS
BODY MASS INDEX: 24.08 KG/M2 | RESPIRATION RATE: 12 BRPM | DIASTOLIC BLOOD PRESSURE: 62 MMHG | SYSTOLIC BLOOD PRESSURE: 110 MMHG | HEART RATE: 100 BPM | WEIGHT: 172 LBS | HEIGHT: 71 IN | TEMPERATURE: 97.6 F

## 2020-10-08 DIAGNOSIS — Z01.818 PRE-OP EXAM: ICD-10-CM

## 2020-10-08 DIAGNOSIS — C61 PROSTATE CANCER (HCC): ICD-10-CM

## 2020-10-08 DIAGNOSIS — E03.9 HYPOTHYROIDISM, UNSPECIFIED TYPE: ICD-10-CM

## 2020-10-08 DIAGNOSIS — Z85.46 HISTORY OF PROSTATE CANCER: ICD-10-CM

## 2020-10-08 DIAGNOSIS — E07.89 OTHER SPECIFIED DISORDERS OF THYROID: ICD-10-CM

## 2020-10-08 PROBLEM — R31.0 GROSS HEMATURIA: Status: RESOLVED | Noted: 2019-05-08 | Resolved: 2020-10-08

## 2020-10-08 PROBLEM — R41.0 ACUTE DELIRIUM: Status: RESOLVED | Noted: 2019-05-10 | Resolved: 2020-10-08

## 2020-10-08 PROBLEM — I95.1 ORTHOSTATIC HYPOTENSION: Status: RESOLVED | Noted: 2017-01-17 | Resolved: 2020-10-08

## 2020-10-08 PROBLEM — N21.0 BLADDER STONES: Status: RESOLVED | Noted: 2019-02-05 | Resolved: 2020-10-08

## 2020-10-08 PROBLEM — N32.0 BLADDER OUTFLOW OBSTRUCTION: Status: RESOLVED | Noted: 2017-01-14 | Resolved: 2020-10-08

## 2020-10-08 LAB
ALBUMIN SERPL-MCNC: 4.1 G/DL (ref 3.5–5.1)
ALP BLD-CCNC: 105 U/L (ref 38–126)
ALT SERPL-CCNC: 17 U/L (ref 11–66)
ANION GAP SERPL CALCULATED.3IONS-SCNC: 17 MEQ/L (ref 8–16)
AST SERPL-CCNC: 19 U/L (ref 5–40)
BASOPHILS # BLD: 0.3 %
BASOPHILS ABSOLUTE: 0 THOU/MM3 (ref 0–0.1)
BILIRUB SERPL-MCNC: 0.8 MG/DL (ref 0.3–1.2)
BUN BLDV-MCNC: 56 MG/DL (ref 7–22)
CALCIUM SERPL-MCNC: 9.8 MG/DL (ref 8.5–10.5)
CHLORIDE BLD-SCNC: 100 MEQ/L (ref 98–111)
CO2: 21 MEQ/L (ref 23–33)
CREAT SERPL-MCNC: 1.8 MG/DL (ref 0.4–1.2)
EOSINOPHIL # BLD: 1.7 %
EOSINOPHILS ABSOLUTE: 0.1 THOU/MM3 (ref 0–0.4)
ERYTHROCYTE [DISTWIDTH] IN BLOOD BY AUTOMATED COUNT: 12.3 % (ref 11.5–14.5)
ERYTHROCYTE [DISTWIDTH] IN BLOOD BY AUTOMATED COUNT: 45.4 FL (ref 35–45)
GFR SERPL CREATININE-BSD FRML MDRD: 35 ML/MIN/1.73M2
GLUCOSE BLD-MCNC: 106 MG/DL (ref 70–108)
HCT VFR BLD CALC: 49.7 % (ref 42–52)
HEMOGLOBIN: 16.4 GM/DL (ref 14–18)
IMMATURE GRANS (ABS): 0.03 THOU/MM3 (ref 0–0.07)
IMMATURE GRANULOCYTES: 0.3 %
INR BLD: 1.14 (ref 0.85–1.13)
LYMPHOCYTES # BLD: 18 %
LYMPHOCYTES ABSOLUTE: 1.6 THOU/MM3 (ref 1–4.8)
MCH RBC QN AUTO: 32.9 PG (ref 26–33)
MCHC RBC AUTO-ENTMCNC: 33 GM/DL (ref 32.2–35.5)
MCV RBC AUTO: 99.6 FL (ref 80–94)
MONOCYTES # BLD: 8.5 %
MONOCYTES ABSOLUTE: 0.7 THOU/MM3 (ref 0.4–1.3)
NUCLEATED RED BLOOD CELLS: 0 /100 WBC
PLATELET # BLD: 331 THOU/MM3 (ref 130–400)
PMV BLD AUTO: 10.1 FL (ref 9.4–12.4)
POTASSIUM SERPL-SCNC: 4.3 MEQ/L (ref 3.5–5.2)
RBC # BLD: 4.99 MILL/MM3 (ref 4.7–6.1)
SEG NEUTROPHILS: 71.2 %
SEGMENTED NEUTROPHILS ABSOLUTE COUNT: 6.3 THOU/MM3 (ref 1.8–7.7)
SODIUM BLD-SCNC: 138 MEQ/L (ref 135–145)
TOTAL PROTEIN: 8.3 G/DL (ref 6.1–8)
WBC # BLD: 8.8 THOU/MM3 (ref 4.8–10.8)

## 2020-10-08 PROCEDURE — 69209 REMOVE IMPACTED EAR WAX UNI: CPT | Performed by: NURSE PRACTITIONER

## 2020-10-08 PROCEDURE — 93000 ELECTROCARDIOGRAM COMPLETE: CPT | Performed by: NURSE PRACTITIONER

## 2020-10-08 PROCEDURE — 71046 X-RAY EXAM CHEST 2 VIEWS: CPT

## 2020-10-08 PROCEDURE — G8427 DOCREV CUR MEDS BY ELIG CLIN: HCPCS | Performed by: NURSE PRACTITIONER

## 2020-10-08 PROCEDURE — 36415 COLL VENOUS BLD VENIPUNCTURE: CPT

## 2020-10-08 PROCEDURE — G8420 CALC BMI NORM PARAMETERS: HCPCS | Performed by: NURSE PRACTITIONER

## 2020-10-08 PROCEDURE — 99213 OFFICE O/P EST LOW 20 MIN: CPT | Performed by: NURSE PRACTITIONER

## 2020-10-08 PROCEDURE — 84153 ASSAY OF PSA TOTAL: CPT

## 2020-10-08 PROCEDURE — G8484 FLU IMMUNIZE NO ADMIN: HCPCS | Performed by: NURSE PRACTITIONER

## 2020-10-08 PROCEDURE — 84443 ASSAY THYROID STIM HORMONE: CPT

## 2020-10-08 PROCEDURE — 80053 COMPREHEN METABOLIC PANEL: CPT

## 2020-10-08 PROCEDURE — 84439 ASSAY OF FREE THYROXINE: CPT

## 2020-10-08 PROCEDURE — 85025 COMPLETE CBC W/AUTO DIFF WBC: CPT

## 2020-10-08 PROCEDURE — 85610 PROTHROMBIN TIME: CPT

## 2020-10-08 RX ORDER — OXYCODONE HYDROCHLORIDE AND ACETAMINOPHEN 5; 325 MG/1; MG/1
TABLET ORAL
COMMUNITY
Start: 2020-10-03 | End: 2021-11-11

## 2020-10-08 RX ORDER — CIPROFLOXACIN 500 MG/1
250 TABLET, FILM COATED ORAL
Status: ON HOLD | COMMUNITY
Start: 2020-10-06 | End: 2021-11-28 | Stop reason: HOSPADM

## 2020-10-08 NOTE — PROGRESS NOTES
medications for this visit. he has not been taking systemic glucocorticoid this past year    CARDIAC RISK FACTORS  he does not have a history of UA or MI within that past 30 days  he does not have decompensated CHF or significant valvular disease  he does not have a history of significant cardiac arrhythmia  he denies chest pain, exertional dyspnea, orthopnea, palpitations or LE edema. he can walk up 1 flight of stairs or 8 steps without stopping (4 METS)    he does not have a history of CAD  he does not have a history of CHF  he does not have a history of CVA or TIA  he does not have a history of DM requiring insulin  he does not have a history of Renal insufficiency (Cr > 2.0)    EKG reveals sinus rhythm with a rate of 100. There are not Q waves and are not ST segment changes. PREOPERATIVE REVIEW OF SYSTEMS:  he does not have a history of MRSA or VRE. he does not have a history of seizures. he does not have a history of phlebitis or blood clots in arms or legs. he does not have a history of sleep apnea. he  does not have a history of snoring loudly enough to be heard through a closed door. Hypothyroidism    HPI:  Currently treated for Hypothyroidism? Yes  Fatigue? No  Recent change in weight? Yes - pt has bene eating less and loosing weight , states sometimes he does not feel hungry. Cold/Heat intolerance? No  Diarrhea/Constipation? No  Diaphoresis? No  Anxiety? No  Palpitations? No   Hair Loss? No  Will repeat labs, pt missed last lab work Son is concerned about father living at home alone, does not think he takes all of his meds appropriately. Will refer to our . General/Constitutional:  Negative for fever, chills, fatigue, recent weight gain or loss. Pt is alert and oriented in the office. HEENT:  Negative for changes in vision, ear pain, nose pain, sore throat, dysphagia or odynophagia.     Cardiovascular:  Negative for palpitations, chest pain, dyspnea on

## 2020-10-08 NOTE — TELEPHONE ENCOUNTER
Marisol, this pt. lives alone and son is concerned he is not taking his meds everyday, he has been eating less and loosing weight. Son wants to know if there are some agencies that could help them out with meals and possibly an aide? Thank you. Please contact the son with this info, his number is on the chart.

## 2020-10-09 ENCOUNTER — APPOINTMENT (OUTPATIENT)
Dept: CT IMAGING | Age: 85
End: 2020-10-09
Payer: MEDICARE

## 2020-10-09 ENCOUNTER — HOSPITAL ENCOUNTER (EMERGENCY)
Age: 85
Discharge: HOME | End: 2020-10-09
Attending: FAMILY MEDICINE
Payer: MEDICARE

## 2020-10-09 ENCOUNTER — TELEPHONE (OUTPATIENT)
Dept: FAMILY MEDICINE CLINIC | Age: 85
End: 2020-10-09

## 2020-10-09 VITALS
BODY MASS INDEX: 23.46 KG/M2 | OXYGEN SATURATION: 99 % | DIASTOLIC BLOOD PRESSURE: 85 MMHG | RESPIRATION RATE: 18 BRPM | HEIGHT: 73 IN | HEART RATE: 84 BPM | TEMPERATURE: 97.8 F | WEIGHT: 177 LBS | SYSTOLIC BLOOD PRESSURE: 149 MMHG

## 2020-10-09 LAB
ALBUMIN SERPL-MCNC: 3.9 G/DL (ref 3.5–5.1)
ALP BLD-CCNC: 97 U/L (ref 38–126)
ALT SERPL-CCNC: 15 U/L (ref 11–66)
ANION GAP SERPL CALCULATED.3IONS-SCNC: 14 MEQ/L (ref 8–16)
AST SERPL-CCNC: 16 U/L (ref 5–40)
BACTERIA: ABNORMAL /HPF
BASOPHILS # BLD: 0.4 %
BASOPHILS ABSOLUTE: 0 THOU/MM3 (ref 0–0.1)
BILIRUB SERPL-MCNC: 0.8 MG/DL (ref 0.3–1.2)
BILIRUBIN URINE: NEGATIVE
BLOOD, URINE: ABNORMAL
BUN BLDV-MCNC: 52 MG/DL (ref 7–22)
CALCIUM SERPL-MCNC: 9.3 MG/DL (ref 8.5–10.5)
CASTS UA: ABNORMAL /LPF
CHARACTER, URINE: ABNORMAL
CHLORIDE BLD-SCNC: 97 MEQ/L (ref 98–111)
CO2: 22 MEQ/L (ref 23–33)
COLOR: ABNORMAL
CREAT SERPL-MCNC: 1.8 MG/DL (ref 0.4–1.2)
CRYSTALS, UA: ABNORMAL
EKG ATRIAL RATE: 91 BPM
EKG P AXIS: 56 DEGREES
EKG P-R INTERVAL: 180 MS
EKG Q-T INTERVAL: 378 MS
EKG QRS DURATION: 86 MS
EKG QTC CALCULATION (BAZETT): 464 MS
EKG R AXIS: -41 DEGREES
EKG T AXIS: 73 DEGREES
EKG VENTRICULAR RATE: 91 BPM
EOSINOPHIL # BLD: 4 %
EOSINOPHILS ABSOLUTE: 0.3 THOU/MM3 (ref 0–0.4)
EPITHELIAL CELLS, UA: ABNORMAL /HPF
ERYTHROCYTE [DISTWIDTH] IN BLOOD BY AUTOMATED COUNT: 12.1 % (ref 11.5–14.5)
ERYTHROCYTE [DISTWIDTH] IN BLOOD BY AUTOMATED COUNT: 43.5 FL (ref 35–45)
GFR SERPL CREATININE-BSD FRML MDRD: 35 ML/MIN/1.73M2
GLUCOSE BLD-MCNC: 112 MG/DL (ref 70–108)
GLUCOSE URINE: NEGATIVE MG/DL
HCT VFR BLD CALC: 46.4 % (ref 42–52)
HEMOGLOBIN: 15.9 GM/DL (ref 14–18)
IMMATURE GRANS (ABS): 0.04 THOU/MM3 (ref 0–0.07)
IMMATURE GRANULOCYTES: 0.6 %
KETONES, URINE: NEGATIVE
LEUKOCYTE ESTERASE, URINE: ABNORMAL
LYMPHOCYTES # BLD: 15.6 %
LYMPHOCYTES ABSOLUTE: 1.1 THOU/MM3 (ref 1–4.8)
MCH RBC QN AUTO: 33.3 PG (ref 26–33)
MCHC RBC AUTO-ENTMCNC: 34.3 GM/DL (ref 32.2–35.5)
MCV RBC AUTO: 97.1 FL (ref 80–94)
MONOCYTES # BLD: 8.4 %
MONOCYTES ABSOLUTE: 0.6 THOU/MM3 (ref 0.4–1.3)
NITRITE, URINE: NEGATIVE
NUCLEATED RED BLOOD CELLS: 0 /100 WBC
OSMOLALITY CALCULATION: 281.2 MOSMOL/KG (ref 275–300)
PH UA: 5.5 (ref 5–9)
PLATELET # BLD: 299 THOU/MM3 (ref 130–400)
PMV BLD AUTO: 9.5 FL (ref 9.4–12.4)
POTASSIUM REFLEX MAGNESIUM: 3.8 MEQ/L (ref 3.5–5.2)
PROSTATE SPECIFIC ANTIGEN: 0.99 NG/ML (ref 0–1)
PROTEIN UA: 100
RBC # BLD: 4.78 MILL/MM3 (ref 4.7–6.1)
RBC URINE: > 100 /HPF
SEG NEUTROPHILS: 71 %
SEGMENTED NEUTROPHILS ABSOLUTE COUNT: 5 THOU/MM3 (ref 1.8–7.7)
SODIUM BLD-SCNC: 133 MEQ/L (ref 135–145)
SPECIFIC GRAVITY, URINE: 1.01 (ref 1–1.03)
T4 FREE: 1.34 NG/DL (ref 0.93–1.76)
TOTAL PROTEIN: 7.9 G/DL (ref 6.1–8)
TSH SERPL DL<=0.05 MIU/L-ACNC: 4.55 UIU/ML (ref 0.4–4.2)
UROBILINOGEN, URINE: 0.2 EU/DL (ref 0–1)
WBC # BLD: 7 THOU/MM3 (ref 4.8–10.8)
WBC UA: > 200 /HPF

## 2020-10-09 PROCEDURE — 74176 CT ABD & PELVIS W/O CONTRAST: CPT

## 2020-10-09 PROCEDURE — 51798 US URINE CAPACITY MEASURE: CPT

## 2020-10-09 PROCEDURE — 87086 URINE CULTURE/COLONY COUNT: CPT

## 2020-10-09 PROCEDURE — 93005 ELECTROCARDIOGRAM TRACING: CPT | Performed by: FAMILY MEDICINE

## 2020-10-09 PROCEDURE — 81001 URINALYSIS AUTO W/SCOPE: CPT

## 2020-10-09 PROCEDURE — 2580000003 HC RX 258: Performed by: STUDENT IN AN ORGANIZED HEALTH CARE EDUCATION/TRAINING PROGRAM

## 2020-10-09 PROCEDURE — 36415 COLL VENOUS BLD VENIPUNCTURE: CPT

## 2020-10-09 PROCEDURE — 85025 COMPLETE CBC W/AUTO DIFF WBC: CPT

## 2020-10-09 PROCEDURE — 99284 EMERGENCY DEPT VISIT MOD MDM: CPT

## 2020-10-09 PROCEDURE — 93010 ELECTROCARDIOGRAM REPORT: CPT | Performed by: INTERNAL MEDICINE

## 2020-10-09 PROCEDURE — 80053 COMPREHEN METABOLIC PANEL: CPT

## 2020-10-09 RX ORDER — 0.9 % SODIUM CHLORIDE 0.9 %
1000 INTRAVENOUS SOLUTION INTRAVENOUS ONCE
Status: COMPLETED | OUTPATIENT
Start: 2020-10-09 | End: 2020-10-09

## 2020-10-09 RX ADMIN — SODIUM CHLORIDE 1000 ML: 9 INJECTION, SOLUTION INTRAVENOUS at 12:15

## 2020-10-09 ASSESSMENT — ENCOUNTER SYMPTOMS
EYE PAIN: 0
VOMITING: 0
DIARRHEA: 0
CONSTIPATION: 0
RHINORRHEA: 0
NAUSEA: 0
ABDOMINAL PAIN: 0
SHORTNESS OF BREATH: 0
COUGH: 0

## 2020-10-09 ASSESSMENT — PAIN DESCRIPTION - PAIN TYPE: TYPE: ACUTE PAIN

## 2020-10-09 ASSESSMENT — PAIN SCALES - GENERAL: PAINLEVEL_OUTOF10: 4

## 2020-10-09 ASSESSMENT — PAIN DESCRIPTION - LOCATION: LOCATION: ABDOMEN

## 2020-10-09 ASSESSMENT — PAIN DESCRIPTION - FREQUENCY: FREQUENCY: CONTINUOUS

## 2020-10-09 ASSESSMENT — PAIN DESCRIPTION - DESCRIPTORS: DESCRIPTORS: DISCOMFORT

## 2020-10-09 NOTE — ED NOTES
The patient is reassured that these symptoms do not appear to represent a serious or threatening condition.        Beverly Arredondo RN  10/09/20 0312

## 2020-10-09 NOTE — ED NOTES
Patient to ED for worsening kidney function. Patient has appointment at the end of October with dr Raymond Bowser to remove stones from bladder. Patient currently being treated for UTI.  Son at [facility], RN  10/09/20 7488

## 2020-10-09 NOTE — ED PROVIDER NOTES
and behavioral problems. PAST MEDICAL AND SURGICAL HISTORY     Past Medical History:   Diagnosis Date    Cancer [facility]) 2000    Prostate    Hyperlipidemia     Hypertension     Kidney stone     Thyroid disease      Past Surgical History:   Procedure Laterality Date    ABDOMEN SURGERY      CYSTOSCOPY N/A 5/8/2019    CYSTOSCOPY TRANSURETHRAL RESECTION OF PROSTATE AND BLADDER  NECK performed by Humberto Abernathy MD at [address] [place] 5/31/2019    CYSTOSCOPY, EVACUATION OF CLOTS, FULGERATION OF BLEEDERS performed by Tate Clark MD at [facility] Right 03/30/2017    with mesh--Dr. Shadi Mccollum    OTHER SURGICAL HISTORY Right 2007    Bad sprain    PROSTATE BIOPSY      prostate cancer with radiation    TURP  03/30/2017       MEDICATIONS   No current facility-administered medications for this encounter. Current Outpatient Medications:     ciprofloxacin (CIPRO) 500 MG tablet, take 1 tablet by mouth twice a day, Disp: , Rfl:     oxyCODONE-acetaminophen (PERCOCET) 5-325 MG per tablet, take 1 tablet by mouth every 8 hours if needed for pain for up to 3 days, Disp: , Rfl:     amLODIPine (NORVASC) 10 MG tablet, Take 1 tablet by mouth daily, Disp: 90 tablet, Rfl: 1    levothyroxine (SYNTHROID) 100 MCG tablet, Take 1 tablet by mouth Daily, Disp: 90 tablet, Rfl: 1    SOCIAL HISTORY     Social History     Social History Narrative    Not on file     Social History     Tobacco Use    Smoking status: Never Smoker    Smokeless tobacco: Never Used   Substance Use Topics    Alcohol use: Yes     Comment: Rarely    Drug use: No       ALLERGIES     Allergies   Allergen Reactions    Penicillins      Unsure-hasn't taken in over 40 yrs.        FAMILY HISTORY     Family History   Problem Relation Age of Onset    Cancer Mother 80    Stroke Mother     High Blood Pressure Mother     High Blood Pressure Father        PREVIOUS RECORDS   Previous records reviewed: Chart     PHYSICAL EXAM     ED Triage Vitals [10/09/20 1131]   BP Temp Temp Source Pulse Resp SpO2 Height Weight   111/67 97.8 °F (36.6 °C) Oral 92 18 99 % 6' 1\" (1.854 m) 177 lb (80.3 kg)     Initial vital signs and nursing assessment reviewed and normal.   Pulsoximetry is normal per my interpretation. Additional Vital Signs:  Vitals:    10/09/20 1612   BP: (!) 149/85   Pulse: 84   Resp: 18   Temp:    SpO2: 99%       Physical Exam  Vitals signs and nursing note reviewed. Constitutional:       General: He is not in acute distress. Appearance: He is well-developed. He is not diaphoretic. HENT:      Head: Normocephalic and atraumatic. Right Ear: External ear normal.      Left Ear: External ear normal.      Nose: Nose normal.   Eyes:      General:         Right eye: No discharge. Left eye: No discharge. Conjunctiva/sclera: Conjunctivae normal.   Cardiovascular:      Rate and Rhythm: Normal rate and regular rhythm. Heart sounds: Normal heart sounds. No murmur. Pulmonary:      Effort: Pulmonary effort is normal.      Breath sounds: Normal breath sounds. No wheezing. Abdominal:      General: There is no distension. Palpations: Abdomen is soft. Tenderness: There is no abdominal tenderness. There is no right CVA tenderness or left CVA tenderness. Skin:     General: Skin is warm and dry. Findings: No erythema or rash. Neurological:      Mental Status: He is alert and oriented to person, place, and time. Cranial Nerves: No cranial nerve deficit. Motor: No weakness. Psychiatric:         Behavior: Behavior normal.         Thought Content: Thought content normal.         Judgment: Judgment normal.         MEDICAL DECISION MAKING   Initial Assessment: Patient was seen and evaluated. Patient was in no acute distress. Vitals signs stable. Patient presenting due to elevated BUN and creatinine from yesterday labs.   Patient is working with Dr. Aria Ordaz to address his kidney and bladder stones. Plan: Appropriate labs and imaging was ordered. Patient was given a 1 L normal saline bolus. ED RESULTS   Laboratory results:  Labs Reviewed   CBC WITH AUTO DIFFERENTIAL - Abnormal; Notable for the following components:       Result Value    MCV 97.1 (*)     MCH 33.3 (*)     All other components within normal limits   COMPREHENSIVE METABOLIC PANEL W/ REFLEX TO MG FOR LOW K - Abnormal; Notable for the following components:    Glucose 112 (*)     CREATININE 1.8 (*)     BUN 52 (*)     Sodium 133 (*)     Chloride 97 (*)     CO2 22 (*)     All other components within normal limits   GLOMERULAR FILTRATION RATE, ESTIMATED - Abnormal; Notable for the following components:    Est, Glom Filt Rate 35 (*)     All other components within normal limits   URINE WITH REFLEXED MICRO - Abnormal; Notable for the following components:    Blood, Urine LARGE (*)     Protein,  (*)     Leukocyte Esterase, Urine LARGE (*)     Color, UA DARK YELLOW (*)     Character, Urine TURBID (*)     All other components within normal limits   CULTURE, REFLEXED, URINE    Narrative:     Source: urine       Site: unknown collect          Current Antibiotics: not stated   ANION GAP   OSMOLALITY       Radiologic studies results:  CT ABDOMEN PELVIS WO CONTRAST Additional Contrast? None   Final Result    IMPRESSION:      1. There is left hydroureteronephrosis. This may be an acute or chronic finding. Appearance is similar to prior study dated January 14, 2017. Interval development of bladder wall thickening and perivesicular infiltration. There is a 2 mm calcification or    stone in the dependent bladder which could reflect a small passed stone. Cystitis with ascending infection involving the left kidney is also a consideration. 2. Moderate stool throughout the colon with fairly extensive diverticulosis involving the distal colon. No evidence of diverticulitis. 3. Coarse calcifications of the prostate gland. Correlation with serology is advised. **This report has been created using voice recognition software. It may contain minor errors which are inherent in voice recognition technology. **      Final report electronically signed by Dr. Josefa Garcia on 10/9/2020 1:03 PM          ED Medications administered this visit:   Medications   0.9 % sodium chloride bolus (0 mLs Intravenous Stopped 10/9/20 1612)       ED COURSE   Patient was seen in the ED for his acute kidney injury. Patient's labs were rechecked which showed a stable kidney function compared to yesterday. Patient CBC is unremarkable. Patient's liver function tests were normal.  Patient's urine continues to show signs of infection. Patient CT showed chronic hydroureteronephrosis. Discussed with patient's urologist the findings and they continue to recommend surgery at this already scheduled time. Discussed with patient and his son the above findings. Discussed patient would benefit from admission and close monitoring. Patient stated he would rather go home. Discussed with patient that he will have to drink plenty of fluids as well as take his antibiotic as prescribed. Patient stated that he would and patient's son stated he would help patient with this. Discussed with rechecking labs in 2 to 3 days to evaluate his kidney function. They are agreeable with this as well. Recommended follow-up with her PCP as well as continue follow-up with Dr. Ernie Mosquera for patient's surgery. Patient discharged in stable condition. Strict return precautions and follow up instructions were discussed with the patient prior to discharge, with which the patient agrees. MEDICATION CHANGES     New Prescriptions    No medications on file       FINAL DISPOSITION     Final diagnoses:   VADIM (acute kidney injury) ([facility] [address]   Acute cystitis with hematuria     Condition: condition: stable  Dispo: Discharge to home    This transcription was electronically signed.  Parts of this transcriptions may have been dictated by use of voice recognition software and electronically transcribed, and parts may have been transcribed with the assistance of an ED scribe. The transcription may contain errors not detected in proofreading. Please refer to my supervising physician's documentation if my documentation differs.     Electronically Signed: Brijesh Zepeda, 10/09/20, 5:01 PM        Brijesh Zepeda MD  Resident  10/09/20 7323

## 2020-10-09 NOTE — ED PROVIDER NOTES
[facility]  eMERGENCY dEPARTMENT eNCOUnter   Physician Attestation    Pt Name: Sahara Rajan  MRN: [id number]  Vandanagfjamari 9/21/1926  Date of evaluation: 10/9/20        Physician Note:    Evaluation:  I have personally performed a face-to-face evaluation on this patient. I have reviewed Dr. Alfred Birmingham findings and agree. History and exam by me shows the following information. Patient's had history of bladder stones    Recurrent urinary urgency    Currently on Cipro for possible UTI    Creatinine was 1.8 on outpatient blood work she was sent to the ER    He has no pain. No fever no chills no nausea no vomiting    Repeat blood work showed BUN 52, creatinine 1.8    Normal CBC    Bladder scan showed 97 mL residual    CT abdomen pelvis showed bladder stones, left-sided hydro-ureteral nephrosis  Left renal cyst present    Right side ureter normal    Urinalysis showed greater than 200 WBC/hpf, greater than 100 RBC/hpf, few bacteria    Given saline IV    After discussion with the patient and family they are comfortable going home with outpatient follow-up with their urologist in [address] on the Cipro for now    Repeat outpatient blood work              1. VADIM (acute kidney injury) ([facility] [address]    2.  Acute cystitis with hematuria          DISPOSITION/PLAN  PATIENT REFERRED TO:  OLGA Bruns [facility]  [phone]    Schedule an appointment as soon as possible for a visit   As needed    DISCHARGE MEDICATIONS:  Discharge Medication List as of 10/9/2020  4:57 PM            MD Ingrid Avalos MD  10/09/20 3524

## 2020-10-09 NOTE — TELEPHONE ENCOUNTER
I phoned patients son after reviewing Mitch's renal function results. Pt is in Acute renal failure and it was recommended he take pt to [facility] ER for further reevaluation and fluid hydration. Pt son in agreement with plan.  Report phoned to [facility] ER.

## 2020-10-09 NOTE — PROGRESS NOTES
Pharmacy Medication History Note      List of current medications patient is taking is complete. Source of information: patient's son    Changes made to medication list:  Medications removed (include reason, ex. therapy complete or physician discontinued):        Medications added/doses adjusted: Other notes (ex. Recent course of antibiotics, Coumadin dosing):  Cipro has had 2-3 doses; written for a 14 day course  Denies use of other OTC or herbal medications.       Allergies reviewed      Electronically signed by Papi Marquez, [address] [place] [address] on 10/9/2020 at 4:08 PM

## 2020-10-11 LAB
ORGANISM: ABNORMAL
URINE CULTURE REFLEX: ABNORMAL

## 2020-10-12 ENCOUNTER — CARE COORDINATION (OUTPATIENT)
Dept: CARE COORDINATION | Age: 85
End: 2020-10-12

## 2020-10-12 SDOH — HEALTH STABILITY: MENTAL HEALTH
STRESS IS WHEN SOMEONE FEELS TENSE, NERVOUS, ANXIOUS, OR CAN'T SLEEP AT NIGHT BECAUSE THEIR MIND IS TROUBLED. HOW STRESSED ARE YOU?: NOT ASKED

## 2020-10-12 SDOH — ECONOMIC STABILITY: TRANSPORTATION INSECURITY
IN THE PAST 12 MONTHS, HAS LACK OF TRANSPORTATION KEPT YOU FROM MEETINGS, WORK, OR FROM GETTING THINGS NEEDED FOR DAILY LIVING?: NO

## 2020-10-12 SDOH — ECONOMIC STABILITY: TRANSPORTATION INSECURITY
IN THE PAST 12 MONTHS, HAS THE LACK OF TRANSPORTATION KEPT YOU FROM MEDICAL APPOINTMENTS OR FROM GETTING MEDICATIONS?: NO

## 2020-10-12 SDOH — SOCIAL STABILITY: SOCIAL NETWORK: HOW OFTEN DO YOU GET TOGETHER WITH FRIENDS OR RELATIVES?: MORE THAN THREE TIMES A WEEK

## 2020-10-12 SDOH — ECONOMIC STABILITY: INCOME INSECURITY: HOW HARD IS IT FOR YOU TO PAY FOR THE VERY BASICS LIKE FOOD, HOUSING, MEDICAL CARE, AND HEATING?: NOT VERY HARD

## 2020-10-12 SDOH — SOCIAL STABILITY: SOCIAL NETWORK: ARE YOU MARRIED, WIDOWED, DIVORCED, SEPARATED, NEVER MARRIED, OR LIVING WITH A PARTNER?: WIDOWED

## 2020-10-12 SDOH — ECONOMIC STABILITY: FOOD INSECURITY: WITHIN THE PAST 12 MONTHS, YOU WORRIED THAT YOUR FOOD WOULD RUN OUT BEFORE YOU GOT MONEY TO BUY MORE.: NEVER TRUE

## 2020-10-12 SDOH — HEALTH STABILITY: PHYSICAL HEALTH: ON AVERAGE, HOW MANY DAYS PER WEEK DO YOU ENGAGE IN MODERATE TO STRENUOUS EXERCISE (LIKE A BRISK WALK)?: 4 DAYS

## 2020-10-12 SDOH — SOCIAL STABILITY: SOCIAL NETWORK
IN A TYPICAL WEEK, HOW MANY TIMES DO YOU TALK ON THE PHONE WITH FAMILY, FRIENDS, OR NEIGHBORS?: MORE THAN THREE TIMES A WEEK

## 2020-10-12 SDOH — HEALTH STABILITY: PHYSICAL HEALTH: ON AVERAGE, HOW MANY MINUTES DO YOU ENGAGE IN EXERCISE AT THIS LEVEL?: 20 MIN

## 2020-10-12 SDOH — ECONOMIC STABILITY: FOOD INSECURITY: WITHIN THE PAST 12 MONTHS, THE FOOD YOU BOUGHT JUST DIDN'T LAST AND YOU DIDN'T HAVE MONEY TO GET MORE.: NEVER TRUE

## 2020-10-12 NOTE — CARE COORDINATION
Brandon, I have enrolled Paulina [place] into Care Coordination program to provide support and services. I have placed a referral with [facility] to get services in the home. Private pay [place] might have to be on option. [facility] [employer] [facility] is already in place. Please approve Plan of Care using smart phrase (. Ccplanofcare.)  Thank you!

## 2020-10-12 NOTE — CARE COORDINATION
Ambulatory Care Coordination Note  10/12/2020  CM Risk Score: 0  Charlson 10 Year Mortality Risk Score: 100%     ACC: Ramon Liz, RN    Summary Note: Spoke with son Sarmad Chappell. Gathered history and current needs due to acute UTI which is causing increased confusion. Sarmad Chappell states Kimmie has been very independent. States the UTI is making his cognitive loss more severe. Currently working with urology and he has placed a call today with urology to followup. States he would like to start off with [facility] referral due to finances. Looking for medication set up support, housekeeping support. He is already active with Meals on Wheels program.  I have call into [facility] to place referral for assistance. Discussed possible [facility] services. Son states he would like to see if AAA can assist before making that decision. No barriers with medication affordability. Kimmie forgets to take his meds and needs reminded daily. Meds set up in pill box. Will followup and make sure services are put in place. Son states SNF placement at this time if not an option they are looking at.  Sent text message to son with my contact information per his request.    Plan  -provide education to help manage acute conditions  -follow up on AAA referral  -Looking into private pay services if agreeable  General Assessment    Do you have any symptoms that are causing concern?:  Yes  Progression since Onset:  Gradually Improving  Reported Symptoms:  Other (Comment: UTI )             Ambulatory Care Coordination Assessment    Care Coordination Protocol  Program Enrollment:  Rising Risk  Referral from Primary Care Provider:  Yes  Week 1 - Initial Assessment     Do you have all of your prescriptions and are they filled?:  Yes  Barriers to medication adherence:  Forgets to take  Are you able to afford your medications?:  Yes  How often do you have trouble taking your medications the way you have been told to take them?:  Sometimes I take them as prescribed. Do you have Home O2 Therapy?:  No      Ability to maintain home (clean home, laundry):  Needs Assistance  Ability to do shopping:  Needs Assistance  Ability to manage finances:  Needs Assistance  Is patient able to live independently?:  Yes     Current Housing:  Private Residence        Per the Fall Risk Screening, did the patient have 2 or more falls or 1 fall with injury in the past year?:  No        Are you experiencing loss of meaning?:  No  Are you experiencing loss of hope and peace?:  No     Thinking about your patient's physical health needs, are there any symptoms or problems (risk indicators) you are unsure about that require further investigation?:  No identified areas of uncertainly or problems already being investigated   Are the patients physical health problems impacting on their mental well-being?:  No identified areas of concern   Are there any problems with your patients lifestyle behaviors (alcohol, drugs, diet, exercise) that are impacting on physical or mental well-being?:  No identified areas of concern   Do you have any other concerns about your patients mental well-being?  How would you rate their severity and impact on the patient?:  No identified areas of concern   How would you rate their home environment in terms of safety and stability (including domestic violence, insecure housing, neighbor harassment)?:  Consistently safe, supportive, stable, no identified problems   How do daily activities impact on the patient's well-being? (include current or anticipated unemployment, work, caregiving, access to transportation or other):  No identified problems or perceived positive benefits   How would you rate their social network (family, work, friends)?:  Adequate participation with social networks   How would you rate their financial resources (including ability to afford all required medical care)?:  Financially secure, resources adequate, no identified problems

## 2020-10-13 ENCOUNTER — HOSPITAL ENCOUNTER (OUTPATIENT)
Age: 85
Discharge: HOME | End: 2020-10-13
Payer: MEDICARE

## 2020-10-13 ENCOUNTER — TELEPHONE (OUTPATIENT)
Dept: FAMILY MEDICINE CLINIC | Age: 85
End: 2020-10-13

## 2020-10-13 LAB
ANION GAP SERPL CALCULATED.3IONS-SCNC: 17 MEQ/L (ref 8–16)
BUN BLDV-MCNC: 21 MG/DL (ref 7–22)
CALCIUM SERPL-MCNC: 9.9 MG/DL (ref 8.5–10.5)
CHLORIDE BLD-SCNC: 97 MEQ/L (ref 98–111)
CO2: 22 MEQ/L (ref 23–33)
CREAT SERPL-MCNC: 1.6 MG/DL (ref 0.4–1.2)
GFR SERPL CREATININE-BSD FRML MDRD: 40 ML/MIN/1.73M2
GLUCOSE BLD-MCNC: 150 MG/DL (ref 70–108)
POTASSIUM SERPL-SCNC: 4.5 MEQ/L (ref 3.5–5.2)
SODIUM BLD-SCNC: 136 MEQ/L (ref 135–145)

## 2020-10-13 PROCEDURE — 36415 COLL VENOUS BLD VENIPUNCTURE: CPT

## 2020-10-13 PROCEDURE — 80048 BASIC METABOLIC PNL TOTAL CA: CPT

## 2020-10-13 NOTE — TELEPHONE ENCOUNTER
----- Message from OLGA Light CNP sent at 10/13/2020  4:07 PM EDT -----  Let pt know know his renal function did improve slightly so this is good news. Continue to monitor him. We did fax his surgical clearance form to Dr. Leticia Mcgill and their office will follow through with surgery as indicated. Let me know if he has any questions.

## 2020-10-13 NOTE — CARE COORDINATION
Was able to reach [address] [place] [address] on Aging this morning and referral placed. They will be reaching out to son this afternoon to see what services he qualifies for.

## 2020-10-20 ENCOUNTER — CARE COORDINATION (OUTPATIENT)
Dept: CARE COORDINATION | Age: 85
End: 2020-10-20

## 2020-10-20 NOTE — CARE COORDINATION
Ambulatory Care Coordination Note  10/20/2020  CM Risk Score: 0  Charlson 10 Year Mortality Risk Score: 100%     ACC: Quentin Dixon, RN    Summary Note: Spoke with [facility]. States Vannesa Ramírez is starting to feel about 80% better from UTI. States confusion is still present but improved. [facility] states he has some health concerns of his own and will be going to [facility] for surgery. He is concerned about having this completed before getting services in place for Vannesa Ramírez. I had placed a referral with [employer] last week and [facility] states he did not hear from them. I called today and confirmed the referral placed and [employer] staff informed me they had not reached out yet. They said they will speed up the referral process and be in touch with Bill to help set up services. [facility] also has a private pay aide that is on standby to assist 3 days weekly. I encouraged [facility] to call me if [employer] did not reach out to him today. Plan  -reinforce education completed and complete additional education to help manage chronic conditions  -ensure AAA referral has been placed  -continue to offer other private pay options for State mental health facility services if AAA not able to provide support  General Assessment    Do you have any symptoms that are causing concern?:  Yes  Progression since Onset:  Gradually Improving  Reported Symptoms:  Other (Comment: UTI)               Care Coordination Interventions    Program Enrollment:  Rising Risk  Referral from Primary Care Provider:  Yes  Suggested Interventions and [address]: In Process  Meals on Wheels:  Completed  Medi Set or Pill Pack:  Completed  Senior Services: In Process         Goals Addressed                 This Visit's Progress     Conditions and Symptoms   On track     I will schedule office visits, as directed by my provider. I will keep my appointment or reschedule if I have to cancel. I will notify my provider of any barriers to my plan of care.   I will notify my provider of any symptoms that indicate a worsening of my condition. UTI  Barriers: impairment:  cognitive  Plan for overcoming my barriers: family, AAA and ACM support  Confidence: 5/10  Anticipated Goal Completion Date: 1-12-21              Prior to Admission medications    Medication Sig Start Date End Date Taking?  Authorizing Provider   ciprofloxacin (CIPRO) 500 MG tablet take 1 tablet by mouth twice a day 10/6/20   Historical Provider, MD   oxyCODONE-acetaminophen (PERCOCET) 5-325 MG per tablet take 1 tablet by mouth every 8 hours if needed for pain for up to 3 days 10/3/20   Historical Provider, MD   amLODIPine (NORVASC) 10 MG tablet Take 1 tablet by mouth daily 9/14/20   OLGA Mendes CNP   levothyroxine (SYNTHROID) 100 MCG tablet Take 1 tablet by mouth Daily 9/14/20   OLGA Mendes CNP       Future Appointments   Date Time Provider Bernadette Duffy   10/11/2021  2:00 PM OLGA eMndes 86

## 2020-10-27 ENCOUNTER — CARE COORDINATION (OUTPATIENT)
Dept: CARE COORDINATION | Age: 85
End: 2020-10-27

## 2020-10-27 NOTE — CARE COORDINATION
Ambulatory Care Coordination Note  10/27/2020  CM Risk Score: 0  Charlson 10 Year Mortality Risk Score: 100%     ACC: Silvia Forte, RN    Summary Note: spoke with Pooja Houston. [facility] contact him and informed him that [facility] would be added to a waiting list and Pooja Houston did not want to do that. States he has [place] services meeting with him today and he is also calling the [facility] to see if they can cover any additional support. He feels he is in a good place right now with getting the support he needs and what [facility] will accept. States German Louis is doing better and improving from confusion caused by UTI. I will continue to follow up and discharge once services are in place. Plan  -reinforce education completed and complete additional education  -assist with getting [place] placement and follow up on [facility] services  -graduate once son feels comfortable with services that are in place          Care Coordination Interventions    Program Enrollment:  Rising Risk  Referral from Primary Care Provider:  Yes  Suggested Interventions and [address] [place] [address] Hwy: In Process  Meals on Wheels:  Completed  Medi Set or Pill Pack:  Completed  Senior Services: In Process         Goals Addressed    None         Prior to Admission medications    Medication Sig Start Date End Date Taking?  Authorizing Provider   ciprofloxacin (CIPRO) 500 MG tablet take 1 tablet by mouth twice a day 10/6/20   Historical Provider, MD   oxyCODONE-acetaminophen (PERCOCET) 5-325 MG per tablet take 1 tablet by mouth every 8 hours if needed for pain for up to 3 days 10/3/20   Historical Provider, MD   amLODIPine (NORVASC) 10 MG tablet Take 1 tablet by mouth daily 9/14/20   OLGA Devries CNP   levothyroxine (SYNTHROID) 100 MCG tablet Take 1 tablet by mouth Daily 9/14/20   OLGA Devries CNP       Future Appointments   Date Time Provider Bernadette Duffy   10/11/2021  2:00 PM OLGA Devries

## 2020-10-27 NOTE — CARE COORDINATION
Unable to reach and unable to leave voicemail to Dulce Copeland due to mailbox not being set up yet. Will try back at another time.

## 2020-11-03 ENCOUNTER — CARE COORDINATION (OUTPATIENT)
Dept: CARE COORDINATION | Age: 85
End: 2020-11-03

## 2021-07-12 ENCOUNTER — TELEPHONE (OUTPATIENT)
Dept: FAMILY MEDICINE CLINIC | Age: 86
End: 2021-07-12

## 2021-07-12 DIAGNOSIS — Z85.46 HISTORY OF PROSTATE CANCER: ICD-10-CM

## 2021-07-12 DIAGNOSIS — E03.9 HYPOTHYROIDISM, UNSPECIFIED TYPE: Primary | ICD-10-CM

## 2021-07-15 ENCOUNTER — HOSPITAL ENCOUNTER (OUTPATIENT)
Age: 86
Discharge: HOME | End: 2021-07-15
Payer: MEDICARE

## 2021-07-15 ENCOUNTER — TELEPHONE (OUTPATIENT)
Dept: FAMILY MEDICINE CLINIC | Age: 86
End: 2021-07-15

## 2021-07-15 ENCOUNTER — NURSE ONLY (OUTPATIENT)
Dept: LAB | Age: 86
End: 2021-07-15

## 2021-07-15 ENCOUNTER — HOSPITAL ENCOUNTER (OUTPATIENT)
Dept: GENERAL RADIOLOGY | Age: 86
Discharge: HOME | End: 2021-07-15
Payer: MEDICARE

## 2021-07-15 DIAGNOSIS — Z85.46 HISTORY OF PROSTATE CANCER: ICD-10-CM

## 2021-07-15 DIAGNOSIS — Z01.818 PRE-OP EXAM: ICD-10-CM

## 2021-07-15 DIAGNOSIS — R52 PAIN: ICD-10-CM

## 2021-07-15 DIAGNOSIS — E03.9 HYPOTHYROIDISM, UNSPECIFIED TYPE: ICD-10-CM

## 2021-07-15 DIAGNOSIS — E07.89 OTHER SPECIFIED DISORDERS OF THYROID: ICD-10-CM

## 2021-07-15 LAB
APTT: 32.2 SECONDS (ref 22–38)
PROSTATE SPECIFIC ANTIGEN: 1.08 NG/ML (ref 0–1)
T4 FREE: 0.14 NG/DL (ref 0.93–1.76)
TSH SERPL DL<=0.05 MIU/L-ACNC: 98.56 UIU/ML (ref 0.4–4.2)

## 2021-07-15 PROCEDURE — 74018 RADEX ABDOMEN 1 VIEW: CPT

## 2021-07-15 NOTE — TELEPHONE ENCOUNTER
Marisol, this pt has problems remembering to take his meds daily, is weak and also needs help with home care and bathing. Pt does live alone son had been helping care for him but had surgery himself and can not at this time.  Can we contact them and see what services they may qualify for, home aid, someone to manage meds etc. Thanks Brandon

## 2021-07-15 NOTE — TELEPHONE ENCOUNTER
----- Message from OLGA Light CNP sent at 7/15/2021  3:27 PM EDT -----  Let son know his dads TSH level is up to 98 and normal is up to 4. I know his son has said Dad is not compliant with his meds. This is very important, let the son know his Dad needs to start taking his thyroid medication everyday, they need to set up a pill box for him or go over everyday to make sure he gets it. Have him listed as being on 100 mcg daily but if he has not had this in several weeks we will need to change the dosage and then build back up again as 100 at one time(if off of it ) is too much.  I will wait for response before moving forward Thanks

## 2021-07-16 ENCOUNTER — CARE COORDINATION (OUTPATIENT)
Dept: CARE COORDINATION | Age: 86
End: 2021-07-16

## 2021-07-16 ENCOUNTER — TELEPHONE (OUTPATIENT)
Dept: FAMILY MEDICINE CLINIC | Age: 86
End: 2021-07-16

## 2021-07-16 DIAGNOSIS — Z91.199 NON-COMPLIANCE: ICD-10-CM

## 2021-07-16 DIAGNOSIS — R53.81 PHYSICAL DECONDITIONING: ICD-10-CM

## 2021-07-16 DIAGNOSIS — E03.9 HYPOTHYROIDISM, UNSPECIFIED TYPE: Primary | ICD-10-CM

## 2021-07-16 NOTE — TELEPHONE ENCOUNTER
Pt son came into the office today and he just wanted to apologize to Amber for blowing up on the phone he feels bad he has been trying to get his dad to take his medication for years and just got frustrated

## 2021-07-16 NOTE — CARE COORDINATION
Ambulatory Care Coordination Note  7/16/2021  CM Risk Score: 0  Charlson 10 Year Mortality Risk Score: 100%     ACC: Gita Toledo, ANNIE    Summary Note:     PCP referral     Per son - patient not taking pills, not bathing, house is filthy, incontinent. Had help a year ago to check on patient, help with meds, and personal care. Patient ended up throwing them out of house after some time. Son is getting other family members to help talk with patient and see if he is agreeable to in home care/help. Family is also going to discuss  and other services. Son had open heart surgery and cannot manage care or cleaning patient's house. Had [facility] in past. [employer] on [address]. Has freezer meals and family brings him food. TSH is off and he is not taking Synthroid. Puts off taking meds and then forgets. Son asking about a \"start up\" for synthroid. Should he resume regular dose? 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 Son called urologist Dr. Neris Starkey and UC done. Waiting on results. Thinks he has a UTI. Agreeable  referral and [facility] referral.     Plan -    referral - spoke with Ju ZAPATA referral made  Note routed to PCP for [facility] orders for skilled nursing and aid and synthroid instructions and refill  Likes to communicate through My Chart        Ambulatory Care Coordination Assessment    Care Coordination Protocol  Program Enrollment: Complex Care  Referral from Primary Care Provider: Yes  Week 1 - Initial Assessment     Do you have all of your prescriptions and are they filled?: Yes  Barriers to medication adherence: Forgets to take  Are you able to afford your medications?: Yes  How often do you have trouble taking your medications the way you have been told to take them?: Sometimes I take them as prescribed.      Do you have Home O2 Therapy?: No      Ability to seek help/take action for Emergent Urgent situations i.e. fire, crime, inclement weather or health crisis. : Independent  Ability to ambulate to restroom: Independent  Ability handle personal hygeine needs (bathing/dressing/grooming): Needs Assistance  Ability to manage Medications: Needs Assistance  Ability to prepare Food Preparation: Needs Assistance  Ability to maintain home (clean home, laundry): Needs Assistance  Ability to drive and/or has transportation: Needs Assistance  Ability to do shopping: Needs Assistance  Ability to manage finances: Needs Assistance  Is patient able to live independently?: Yes     Current Housing: Private Residence        Per the Fall Risk Screening, did the patient have 2 or more falls or 1 fall with injury in the past year?: No     Frequent urination at night?: Yes  Do you use rails/bars?: No  Do you have a non-slip tub mat?: No     Are you experiencing loss of meaning?: No  Are you experiencing loss of hope and peace?: No     Thinking about your patient's physical health needs, are there any symptoms or problems (risk indicators) you are unsure about that require further investigation?: Moderate to severe symptoms or problems that impact on daily life   Are the patients physical health problems impacting on their mental well-being?: Mild impact on mental well-being e.g. \"\"feeling fed-up\"\", \"\"reduced enjoyment\"\"   Are there any problems with your patients lifestyle behaviors (alcohol, drugs, diet, exercise) that are impacting on physical or mental well-being?: No identified areas of concern   Do you have any other concerns about your patients mental well-being?  How would you rate their severity and impact on the patient?: Mild problems - don't interfere with function   How would you rate their home environment in terms of safety and stability (including domestic violence, insecure housing, neighbor harassment)?: Consistently safe, supportive, stable, no identified problems   How do daily activities impact on the patient's well-being? (include current or anticipated unemployment, work, caregiving, access to transportation or other): Some general dissatisfaction but no concern   How would you rate their social network (family, work, friends)?: Restricted participation with some degree of social isolation   How would you rate their financial resources (including ability to afford all required medical care)?: Financially secure, some resource challenges   How wells does the patient now understand their health and well-being (symptoms, signs or risk factors) and what they need to do to manage their health?: Reasonable to good understanding and already engages in managing health or is willing to undertake better management   How well do you think your patient can engage in healthcare discussions? (Barriers include language, deafness, aphasia, alcohol or drug problems, learning difficulties, concentration): Adequate communication, with or without minor barriers   Do other services need to be involved to help this patient?: Other care/services in place but not sufficient   Are current services involved with this patient well-coordinated? (Include coordination with other services you are now recommendation): Required care/services in place with some coordination barriers   Suggested Interventions and Community Resources   Home Care Waiver: In Process   Meals on Wheels: Completed   Other Services or Interventions: urologist Dr. Kelsey Mata: In Process   Social Work: Completed   Transportation Services: Declined         Schedule an appointment with the patient's PCP, Set up/Review an Education Plan, Set up/Review Goals              Prior to Admission medications    Medication Sig Start Date End Date Taking?  Authorizing Provider   ciprofloxacin (CIPRO) 500 MG tablet take 1 tablet by mouth twice a day 10/6/20  Yes Historical Provider, MD   levothyroxine (SYNTHROID) 100 MCG tablet Take 1 tablet by mouth Daily 9/14/20  Yes OLGA Burns - [place] oxyCODONE-acetaminophen (PERCOCET) 5-325 MG per tablet take 1 tablet by mouth every 8 hours if needed for pain for up to 3 days  Patient not taking: Reported on 7/16/2021 10/3/20   Historical Provider, MD   amLODIPine (NORVASC) 10 MG tablet Take 1 tablet by mouth daily  Patient not taking: Reported on 7/16/2021 9/14/20   OLGA Perez - CNP       Future Appointments   Date Time Provider Bernadette Duffy   10/11/2021  2:00 PM OLGA Perez

## 2021-07-16 NOTE — TELEPHONE ENCOUNTER
Pt son came in today, he wants to have a conversation with his dad about starting his medication.  He would like to know if when starting this medication are you able to send a refill in to the pharmacy for a \"start up\" on the thyroid meds

## 2021-07-17 LAB
ORGANISM: ABNORMAL
URINE CULTURE, ROUTINE: ABNORMAL

## 2021-07-18 RX ORDER — LEVOTHYROXINE SODIUM 0.05 MG/1
50 TABLET ORAL DAILY
Qty: 90 TABLET | Refills: 1 | Status: SHIPPED | OUTPATIENT
Start: 2021-07-18 | End: 2021-07-19 | Stop reason: SDUPTHER

## 2021-07-18 NOTE — CARE COORDINATION
[address] referral placed, please advise son pt will need to start with 50 mcg of synthroid, which has been sent to his pharmacy and will need repeat lab sin 8 weeks, please mail TSH order to his home.  Thank You

## 2021-07-18 NOTE — TELEPHONE ENCOUNTER
This was addressed in the Clinical Care coordinator note.  He needs to start at 50 mcg daily, new script sent to pharmacy and recheck labs in 8 weeks, orders placed

## 2021-07-19 ENCOUNTER — CARE COORDINATION (OUTPATIENT)
Dept: CARE COORDINATION | Age: 86
End: 2021-07-19

## 2021-07-19 ENCOUNTER — PATIENT MESSAGE (OUTPATIENT)
Dept: FAMILY MEDICINE CLINIC | Age: 86
End: 2021-07-19

## 2021-07-19 DIAGNOSIS — E03.9 HYPOTHYROIDISM, UNSPECIFIED TYPE: ICD-10-CM

## 2021-07-19 RX ORDER — LEVOTHYROXINE SODIUM 0.05 MG/1
50 TABLET ORAL DAILY
Qty: 90 TABLET | Refills: 1 | Status: SHIPPED | OUTPATIENT
Start: 2021-07-19 | End: 2022-01-24 | Stop reason: SDUPTHER

## 2021-07-19 NOTE — TELEPHONE ENCOUNTER
From: Morgan Tse  To: OLGA Prieto - CNP  Sent: 7/19/2021 3:46 PM EDT  Subject: Prescription Question    Jan, Please send Paulette Albert ([id number]) 50mg re-start synthroid script to [facility] [address] at your convenience.   Thank Karen Mae  [id number]  [phone]

## 2021-07-19 NOTE — CARE COORDINATION
Spoke with [place]. He remembered me from working with Charlotte Martinez in the past.  Will be taking over his care again with [facility] enrollment to provide support from PCP referral.  Informed Bill of thyroid orders and need for repeat lab in 8 weeks. I will ensure office staff has sent order in mail per PCP recommendation. [facility] and AAA referrals placed.

## 2021-07-19 NOTE — CARE COORDINATION
Wanted to follow up to see if PCP staff placed order for repeat TSH in 8 weeks in the mail for Snehal Figueroa? Diya advise. Thank you.

## 2021-07-19 NOTE — TELEPHONE ENCOUNTER
See additional care coordinator note from today 7/19/21 with [facility] coordinator for correspondence.      Lab order mailed to pt

## 2021-07-27 ENCOUNTER — CARE COORDINATION (OUTPATIENT)
Dept: CARE COORDINATION | Age: 86
End: 2021-07-27

## 2021-07-27 NOTE — CARE COORDINATION
5-325 MG per tablet take 1 tablet by mouth every 8 hours if needed for pain for up to 3 days  Patient not taking: Reported on 7/16/2021 10/3/20   Historical Provider, MD   amLODIPine (NORVASC) 10 MG tablet Take 1 tablet by mouth daily  Patient not taking: Reported on 7/16/2021 9/14/20   Corinne Raja, APRN - CNP       Future Appointments   Date Time Provider [facility]   10/11/2021  2:00 PM Corinne Raja, APRN - [address]

## 2021-08-04 ENCOUNTER — CARE COORDINATION (OUTPATIENT)
Dept: CARE COORDINATION | Age: 86
End: 2021-08-04

## 2021-08-04 NOTE — CARE COORDINATION
Spoke with son briefly. He was at an appt at the time of call. States everything is fine with Dad. Informed him to call me with any needs and I could reach out at another time.

## 2021-08-24 ENCOUNTER — CARE COORDINATION (OUTPATIENT)
Dept: CARE COORDINATION | Age: 86
End: 2021-08-24

## 2021-09-07 ENCOUNTER — CARE COORDINATION (OUTPATIENT)
Dept: CARE COORDINATION | Age: 86
End: 2021-09-07

## 2021-09-07 NOTE — CARE COORDINATION
Brandon, I have been working with Ana Schumacher on Care Coordination program to provide support and education to help manage chronic conditions. AAA referral has been placed and have been in contact with son, Leah Merino. I would like to graduate him from Care Coordination at this time pending PCP approval.  Do you approve of this discharge? Please advise. Thank you.

## 2021-10-11 ENCOUNTER — OFFICE VISIT (OUTPATIENT)
Dept: FAMILY MEDICINE CLINIC | Age: 86
End: 2021-10-11
Payer: MEDICARE

## 2021-10-11 VITALS
BODY MASS INDEX: 25.08 KG/M2 | OXYGEN SATURATION: 99 % | SYSTOLIC BLOOD PRESSURE: 120 MMHG | HEIGHT: 73 IN | HEART RATE: 88 BPM | DIASTOLIC BLOOD PRESSURE: 72 MMHG | WEIGHT: 189.25 LBS | RESPIRATION RATE: 16 BRPM | TEMPERATURE: 98.1 F

## 2021-10-11 DIAGNOSIS — Z13.220 SCREENING CHOLESTEROL LEVEL: ICD-10-CM

## 2021-10-11 DIAGNOSIS — E03.9 HYPOTHYROIDISM, UNSPECIFIED TYPE: ICD-10-CM

## 2021-10-11 DIAGNOSIS — Z00.00 ROUTINE GENERAL MEDICAL EXAMINATION AT A HEALTH CARE FACILITY: Primary | ICD-10-CM

## 2021-10-11 PROCEDURE — G0438 PPPS, INITIAL VISIT: HCPCS | Performed by: NURSE PRACTITIONER

## 2021-10-11 ASSESSMENT — PATIENT HEALTH QUESTIONNAIRE - PHQ9
SUM OF ALL RESPONSES TO PHQ QUESTIONS 1-9: 0
SUM OF ALL RESPONSES TO PHQ QUESTIONS 1-9: 0
2. FEELING DOWN, DEPRESSED OR HOPELESS: 0
SUM OF ALL RESPONSES TO PHQ QUESTIONS 1-9: 0
1. LITTLE INTEREST OR PLEASURE IN DOING THINGS: 0
SUM OF ALL RESPONSES TO PHQ9 QUESTIONS 1 & 2: 0

## 2021-10-11 ASSESSMENT — LIFESTYLE VARIABLES: HOW OFTEN DO YOU HAVE A DRINK CONTAINING ALCOHOL: 0

## 2021-10-11 NOTE — PATIENT INSTRUCTIONS
Personalized Preventive Plan for Taisha Stubbs - 10/11/2021  Medicare offers a range of preventive health benefits. Some of the tests and screenings are paid in full while other may be subject to a deductible, co-insurance, and/or copay. Some of these benefits include a comprehensive review of your medical history including lifestyle, illnesses that may run in your family, and various assessments and screenings as appropriate. After reviewing your medical record and screening and assessments performed today your provider may have ordered immunizations, labs, imaging, and/or referrals for you. A list of these orders (if applicable) as well as your Preventive Care list are included within your After Visit Summary for your review. Other Preventive Recommendations:    · A preventive eye exam performed by an eye specialist is recommended every 1-2 years to screen for glaucoma; cataracts, macular degeneration, and other eye disorders. · A preventive dental visit is recommended every 6 months. · Try to get at least 150 minutes of exercise per week or 10,000 steps per day on a pedometer . · Order or download the FREE \"Exercise & Physical Activity: Your Everyday Guide\" from [employer] on Aging. Call 3-864.727.4197 or search [employer] on Aging online. · You need 4004-4820 mg of calcium and 6552-6354 IU of vitamin D per day. It is possible to meet your calcium requirement with diet alone, but a vitamin D supplement is usually necessary to meet this goal.  · When exposed to the sun, use a sunscreen that protects against both UVA and UVB radiation with an SPF of 30 or greater. Reapply every 2 to 3 hours or after sweating, drying off with a towel, or swimming. · Always wear a seat belt when traveling in a car. Always wear a helmet when riding a bicycle or motorcycle.

## 2021-10-11 NOTE — PROGRESS NOTES
Medicare Annual Wellness Visit  Name: Vu Zazueta Date: 10/11/2021   MRN: [id number] Sex: Male   Age: 80 y.o. Ethnicity: Non- / Non    : 1926 Race: White (non-)      Ramón Castaneda is here for Medicare AWV (no concerns)    Screenings for behavioral, psychosocial and functional/safety risks, and cognitive dysfunction are all negative except as indicated below. These results, as well as other patient data from the [address] form, are documented in Flowsheets linked to this Encounter. Hypothyroidism    HPI:  Currently treated for Hypothyroidism? Yes  Fatigue? No  Recent change in weight? No  Cold/Heat intolerance? No  Diarrhea/Constipation? No  Diaphoresis? No  Anxiety? No  Palpitations? No   Hair Loss? No    Pt non compliant with his medication, he forgets to take it. Lab Results   Component Value Date    TSH 98.560 (H) 07/15/2021    T4FREE 0.14 (L) 07/15/2021   WE discussed this in detail with his son, and pt states he is  going to be more compliant with his medications. I did discuss with pt the risks of not taking his thyroid medications. Sees urology for elevated PSA and UTI's on cipro daily for this. Allergies   Allergen Reactions    Penicillins      Unsure-hasn't taken in over 40 yrs. Prior to Visit Medications    Medication Sig Taking?  Authorizing Provider   levothyroxine (SYNTHROID) 50 MCG tablet Take 1 tablet by mouth daily Yes OLGA Smith CNP   ciprofloxacin (CIPRO) 500 MG tablet take 1 tablet by mouth twice a day Yes Historical Provider, MD   oxyCODONE-acetaminophen (PERCOCET) 5-325 MG per tablet take 1 tablet by mouth every 8 hours if needed for pain for up to 3 days Yes Historical Provider, MD   amLODIPine (NORVASC) 10 MG tablet Take 1 tablet by mouth daily Yes OLGA Smith CNP         Past Medical History:   Diagnosis Date    Cancer [facility])     Prostate    Hyperlipidemia     Hypertension  Kidney stone     Thyroid disease        Past Surgical History:   Procedure Laterality Date    ABDOMEN SURGERY      CYSTOSCOPY N/A 5/8/2019    CYSTOSCOPY TRANSURETHRAL RESECTION OF PROSTATE AND BLADDER  NECK performed by Elio Munguia MD at [address] 5/31/2019    CYSTOSCOPY, EVACUATION OF CLOTS, FULGERATION OF BLEEDERS performed by Shane Marino MD at [place] Right 03/30/2017    with mesh--Dr. Juan White    OTHER SURGICAL HISTORY Right 2007    Bad sprain    PROSTATE BIOPSY      prostate cancer with radiation    TURP  03/30/2017         Family History   Problem Relation Age of Onset    Cancer Mother 80    Stroke Mother     High Blood Pressure Mother     High Blood Pressure Father        CareTeam (Including outside providers/suppliers regularly involved in providing care):   Patient Care Team:  OLGA Yadav CNP as PCP - General (Family Nurse Practitioner)  OLGA Yadav CNP as PCP - [facility] Empaneled Provider    Wt Readings from Last 3 Encounters:   10/11/21 189 lb 4 oz (85.8 kg)   10/09/20 177 lb (80.3 kg)   10/08/20 172 lb (78 kg)     Vitals:    10/11/21 1406   BP: 120/72   Pulse: 88   Resp: 16   Temp: 98.1 °F (36.7 °C)   TempSrc: Oral   SpO2: 99%   Weight: 189 lb 4 oz (85.8 kg)   Height: 6' 1\" (1.854 m)     Body mass index is 24.97 kg/m². Based upon direct observation of the patient, evaluation of cognition reveals recent and remote memory intact.     General Appearance: alert and oriented to person, place and time, well-developed and well-nourished, in no acute distress  Skin: warm and dry, no rash or erythema  Head: normocephalic and atraumatic  Neck: neck supple and non tender without mass, no thyromegaly or thyroid nodules, no cervical lymphadenopathy   Pulmonary/Chest: clear to auscultation bilaterally- no wheezes, rales or rhonchi, normal air movement, no respiratory distress  Cardiovascular: normal rate, normal S1 and S2, no gallops, intact distal pulses and no carotid bruits  Abdomen: soft, non-tender, non-distended, normal bowel sounds, no masses or organomegaly  Extremities: no cyanosis, no clubbing and no edema  Neurologic: reflexes normal and symmetric, gait and coordination normal and speech normal    Patient's complete Health Risk Assessment and screening values have been reviewed and are found in Flowsheets. The following problems were reviewed today and where indicated follow up appointments were made and/or referrals ordered. Positive Risk Factor Screenings with Interventions:      Cognitive:   Words recalled: 0 Words Recalled  Clock Drawing Test (CDT) Score: Normal  Total Score Interpretation: Positive Mini-Cog  Cognitive Impairment Interventions:  · labs ordered        Health Habits/Nutrition:  Health Habits/Nutrition  Do you exercise for at least 20 minutes 2-3 times per week?: (!) No  Have you lost any weight without trying in the past 3 months?: No  Do you eat only one meal per day?: No  Have you seen the dentist within the past year?: (!) No  Body mass index: 24.97  Health Habits/Nutrition Interventions:  · Dental exam overdue:  patient encouraged to make appointment with his/her dentist    Hearing/Vision:  No exam data present  Hearing/Vision  Do you or your family notice any trouble with your hearing that hasn't been managed with hearing aids?: No  Do you have difficulty driving, watching TV, or doing any of your daily activities because of your eyesight?: No  Have you had an eye exam within the past year?: (!) No  Hearing/Vision Interventions:  · Hearing concerns:  patient declines any further evaluation/treatment for hearing issues    Safety:  Safety  Do you have working smoke detectors?: (!) No  Have all throw rugs been removed or fastened?: Yes  Do you have non-slip mats or surfaces in all bathtubs/showers?: (!) No  Do all of your stairways have a railing or banister?: Yes  Are your doorways, halls and stairs free of clutter?: Yes  Do you always fasten your seatbelt when you are in a car?: Yes  Safety Interventions:  · Home safety tips provided  · Referred to [place] on [facility]    ADL:  ADLs  In the past 7 days, did you need help from others to perform any of the following everyday activities? Eating, dressing, grooming, bathing, toileting, or walking/balance?: None  In the past 7 days, did you need help from others to take care of any of the following? Laundry, housekeeping, banking/finances, shopping, telephone use, food preparation, transportation, or taking medications?: [employer], Housekeeping, Taking Medications  ADL Interventions:  · son takes care fo pt and takes care of his house and laundry , son is looking into [place] on aging for help with services. Personalized Preventive Plan   Current Health Maintenance Status  Immunization History   Administered Date(s) Administered    COVID-19, Boyle Peter, PF, 30mcg/0.3mL 01/22/2021, 02/19/2021        Health Maintenance   Topic Date Due    DTaP/Tdap/Td vaccine (1 - Tdap) Never done    Shingles Vaccine (1 of 2) Never done    Pneumococcal 65+ years Vaccine (1 of 1 - PPSV23) Never done   [employer] Visit (AWV)  Never done    Flu vaccine (1) Never done    Potassium monitoring  10/13/2021    Creatinine monitoring  10/13/2021    TSH testing  07/15/2022    PSA counseling  07/15/2022    COVID-19 Vaccine  Completed    Hepatitis A vaccine  Aged Out    Hepatitis B vaccine  Aged Out    Hib vaccine  Aged Out    Meningococcal (ACWY) vaccine  Aged Out     Recommendations for [employer] Due: see orders and patient instructions/AVS.  . Recommended screening schedule for the next 5-10 years is provided to the patient in written form: see Patient Karen Persons was seen today for medicare awv.     Diagnoses and all orders for this visit:    Routine general medical examination at a health care facility  -     Basic Metabolic Panel; Future  -     TSH With Reflex Ft4; Future    Screening cholesterol level  -     Lipid Panel;  Future    Hypothyroidism, unspecified type  -     TSH With Reflex Ft4; Future    Pt an son in agreement with plan

## 2021-11-10 ENCOUNTER — HOSPITAL ENCOUNTER (INPATIENT)
Age: 86
LOS: 17 days | Discharge: HOME HEALTH | DRG: 640 | End: 2021-11-28
Attending: EMERGENCY MEDICINE | Admitting: PEDIATRICS
Payer: MEDICARE

## 2021-11-10 ENCOUNTER — APPOINTMENT (OUTPATIENT)
Dept: GENERAL RADIOLOGY | Age: 86
DRG: 640 | End: 2021-11-10
Payer: MEDICARE

## 2021-11-10 ENCOUNTER — APPOINTMENT (OUTPATIENT)
Dept: CT IMAGING | Age: 86
DRG: 640 | End: 2021-11-10
Payer: MEDICARE

## 2021-11-10 DIAGNOSIS — R77.8 TROPONIN LEVEL ELEVATED: ICD-10-CM

## 2021-11-10 DIAGNOSIS — R41.82 ALTERED MENTAL STATUS, UNSPECIFIED ALTERED MENTAL STATUS TYPE: Primary | ICD-10-CM

## 2021-11-10 DIAGNOSIS — E03.9 HYPOTHYROIDISM, UNSPECIFIED TYPE: ICD-10-CM

## 2021-11-10 DIAGNOSIS — N39.0 URINARY TRACT INFECTION IN MALE: ICD-10-CM

## 2021-11-10 DIAGNOSIS — N17.9 AKI (ACUTE KIDNEY INJURY) (HCC): ICD-10-CM

## 2021-11-10 LAB
ALBUMIN SERPL-MCNC: 4.6 G/DL (ref 3.5–5.1)
ALP BLD-CCNC: 89 U/L (ref 38–126)
ALT SERPL-CCNC: 14 U/L (ref 11–66)
ANION GAP SERPL CALCULATED.3IONS-SCNC: 19 MEQ/L (ref 8–16)
AST SERPL-CCNC: 34 U/L (ref 5–40)
BACTERIA: ABNORMAL /HPF
BASOPHILS # BLD: 0.4 %
BASOPHILS ABSOLUTE: 0 THOU/MM3 (ref 0–0.1)
BILIRUB SERPL-MCNC: 0.9 MG/DL (ref 0.3–1.2)
BILIRUBIN URINE: ABNORMAL
BLOOD, URINE: ABNORMAL
BUN BLDV-MCNC: 33 MG/DL (ref 7–22)
CALCIUM SERPL-MCNC: 10.2 MG/DL (ref 8.5–10.5)
CASTS 2: ABNORMAL /LPF
CASTS UA: ABNORMAL /LPF
CHARACTER, URINE: ABNORMAL
CHLORIDE BLD-SCNC: 101 MEQ/L (ref 98–111)
CO2: 20 MEQ/L (ref 23–33)
COLOR: ABNORMAL
CREAT SERPL-MCNC: 2.1 MG/DL (ref 0.4–1.2)
CRYSTALS, UA: ABNORMAL
EOSINOPHIL # BLD: 1.5 %
EOSINOPHILS ABSOLUTE: 0.1 THOU/MM3 (ref 0–0.4)
EPITHELIAL CELLS, UA: ABNORMAL /HPF
ERYTHROCYTE [DISTWIDTH] IN BLOOD BY AUTOMATED COUNT: 13.1 % (ref 11.5–14.5)
ERYTHROCYTE [DISTWIDTH] IN BLOOD BY AUTOMATED COUNT: 49.8 FL (ref 35–45)
GFR SERPL CREATININE-BSD FRML MDRD: 29 ML/MIN/1.73M2
GLUCOSE BLD-MCNC: 99 MG/DL (ref 70–108)
GLUCOSE URINE: NEGATIVE MG/DL
HCT VFR BLD CALC: 45 % (ref 42–52)
HEMOGLOBIN: 15.6 GM/DL (ref 14–18)
ICTOTEST: NEGATIVE
IMMATURE GRANS (ABS): 0.02 THOU/MM3 (ref 0–0.07)
IMMATURE GRANULOCYTES: 0.3 %
KETONES, URINE: ABNORMAL
LACTIC ACID: 1.9 MMOL/L (ref 0.5–2)
LEUKOCYTE ESTERASE, URINE: ABNORMAL
LYMPHOCYTES # BLD: 21.6 %
LYMPHOCYTES ABSOLUTE: 1.4 THOU/MM3 (ref 1–4.8)
MCH RBC QN AUTO: 35.8 PG (ref 26–33)
MCHC RBC AUTO-ENTMCNC: 34.7 GM/DL (ref 32.2–35.5)
MCV RBC AUTO: 103.2 FL (ref 80–94)
MISCELLANEOUS 2: ABNORMAL
MONOCYTES # BLD: 7.6 %
MONOCYTES ABSOLUTE: 0.5 THOU/MM3 (ref 0.4–1.3)
NITRITE, URINE: NEGATIVE
NUCLEATED RED BLOOD CELLS: 0 /100 WBC
OSMOLALITY CALCULATION: 286.7 MOSMOL/KG (ref 275–300)
PH UA: 6.5 (ref 5–9)
PLATELET # BLD: 236 THOU/MM3 (ref 130–400)
PMV BLD AUTO: 9.5 FL (ref 9.4–12.4)
POTASSIUM SERPL-SCNC: 4.5 MEQ/L (ref 3.5–5.2)
PRO-BNP: 1416 PG/ML (ref 0–1800)
PROTEIN UA: 300
RBC # BLD: 4.36 MILL/MM3 (ref 4.7–6.1)
RBC URINE: ABNORMAL /HPF
RENAL EPITHELIAL, UA: ABNORMAL
SEG NEUTROPHILS: 68.6 %
SEGMENTED NEUTROPHILS ABSOLUTE COUNT: 4.6 THOU/MM3 (ref 1.8–7.7)
SODIUM BLD-SCNC: 140 MEQ/L (ref 135–145)
SPECIFIC GRAVITY, URINE: 1.03 (ref 1–1.03)
TOTAL PROTEIN: 7.7 G/DL (ref 6.1–8)
UROBILINOGEN, URINE: 1 EU/DL (ref 0–1)
WBC # BLD: 6.7 THOU/MM3 (ref 4.8–10.8)
WBC UA: ABNORMAL /HPF
YEAST: ABNORMAL

## 2021-11-10 PROCEDURE — 2580000003 HC RX 258: Performed by: EMERGENCY MEDICINE

## 2021-11-10 PROCEDURE — 81001 URINALYSIS AUTO W/SCOPE: CPT

## 2021-11-10 PROCEDURE — 71045 X-RAY EXAM CHEST 1 VIEW: CPT

## 2021-11-10 PROCEDURE — 84145 PROCALCITONIN (PCT): CPT

## 2021-11-10 PROCEDURE — 84439 ASSAY OF FREE THYROXINE: CPT

## 2021-11-10 PROCEDURE — 96365 THER/PROPH/DIAG IV INF INIT: CPT

## 2021-11-10 PROCEDURE — 96361 HYDRATE IV INFUSION ADD-ON: CPT

## 2021-11-10 PROCEDURE — 87635 SARS-COV-2 COVID-19 AMP PRB: CPT

## 2021-11-10 PROCEDURE — 85025 COMPLETE CBC W/AUTO DIFF WBC: CPT

## 2021-11-10 PROCEDURE — 87040 BLOOD CULTURE FOR BACTERIA: CPT

## 2021-11-10 PROCEDURE — 93005 ELECTROCARDIOGRAM TRACING: CPT | Performed by: EMERGENCY MEDICINE

## 2021-11-10 PROCEDURE — 82140 ASSAY OF AMMONIA: CPT

## 2021-11-10 PROCEDURE — 84443 ASSAY THYROID STIM HORMONE: CPT

## 2021-11-10 PROCEDURE — 83880 ASSAY OF NATRIURETIC PEPTIDE: CPT

## 2021-11-10 PROCEDURE — 80053 COMPREHEN METABOLIC PANEL: CPT

## 2021-11-10 PROCEDURE — 87804 INFLUENZA ASSAY W/OPTIC: CPT

## 2021-11-10 PROCEDURE — 83605 ASSAY OF LACTIC ACID: CPT

## 2021-11-10 PROCEDURE — 70450 CT HEAD/BRAIN W/O DYE: CPT

## 2021-11-10 PROCEDURE — 36415 COLL VENOUS BLD VENIPUNCTURE: CPT

## 2021-11-10 PROCEDURE — 84484 ASSAY OF TROPONIN QUANT: CPT

## 2021-11-10 PROCEDURE — 99284 EMERGENCY DEPT VISIT MOD MDM: CPT

## 2021-11-10 RX ORDER — 0.9 % SODIUM CHLORIDE 0.9 %
30 INTRAVENOUS SOLUTION INTRAVENOUS ONCE
Status: COMPLETED | OUTPATIENT
Start: 2021-11-10 | End: 2021-11-11

## 2021-11-10 RX ADMIN — SODIUM CHLORIDE 1000 ML: 9 INJECTION, SOLUTION INTRAVENOUS at 23:29

## 2021-11-10 NOTE — Clinical Note
Patient Class: Inpatient [101]   REQUIRED: Diagnosis: Sepsis secondary to UTI [facility]) [[id number]]   Estimated Length of Stay: Estimated stay of more than 2 midnights   Admitting Provider: Jason Castillo [[id number]]   Telemetry/Cardiac Monitoring Required?: Yes

## 2021-11-11 ENCOUNTER — TELEPHONE (OUTPATIENT)
Dept: FAMILY MEDICINE CLINIC | Age: 86
End: 2021-11-11

## 2021-11-11 PROBLEM — N39.0 SEPSIS SECONDARY TO UTI (HCC): Status: ACTIVE | Noted: 2021-01-01

## 2021-11-11 PROBLEM — A41.9 SEPSIS SECONDARY TO UTI (HCC): Status: ACTIVE | Noted: 2021-01-01

## 2021-11-11 LAB
AMMONIA: 45 UMOL/L (ref 11–60)
EKG ATRIAL RATE: 92 BPM
EKG P AXIS: 42 DEGREES
EKG P-R INTERVAL: 224 MS
EKG Q-T INTERVAL: 346 MS
EKG QRS DURATION: 72 MS
EKG QTC CALCULATION (BAZETT): 427 MS
EKG R AXIS: -40 DEGREES
EKG T AXIS: 13 DEGREES
EKG VENTRICULAR RATE: 92 BPM
FLU A ANTIGEN: NEGATIVE
FLU B ANTIGEN: NEGATIVE
LACTIC ACID, SEPSIS: 0.9 MMOL/L (ref 0.5–1.9)
LACTIC ACID: 1.1 MMOL/L (ref 0.5–2)
MAGNESIUM: 2.3 MG/DL (ref 1.6–2.4)
MRSA SCREEN RT-PCR: NEGATIVE
PROCALCITONIN: 0.1 NG/ML (ref 0.01–0.09)
SARS-COV-2, NAAT: NOT  DETECTED
T4 FREE: 0.56 NG/DL (ref 0.93–1.76)
TROPONIN T: 0.03 NG/ML
TROPONIN T: 0.03 NG/ML
TROPONIN T: 0.06 NG/ML
TSH SERPL DL<=0.05 MIU/L-ACNC: 126.8 UIU/ML (ref 0.4–4.2)
VANCOMYCIN RESISTANT ENTEROCOCCUS: NEGATIVE

## 2021-11-11 PROCEDURE — 6370000000 HC RX 637 (ALT 250 FOR IP): Performed by: PEDIATRICS

## 2021-11-11 PROCEDURE — 2580000003 HC RX 258: Performed by: PEDIATRICS

## 2021-11-11 PROCEDURE — 2580000003 HC RX 258: Performed by: INTERNAL MEDICINE

## 2021-11-11 PROCEDURE — 6360000002 HC RX W HCPCS: Performed by: PEDIATRICS

## 2021-11-11 PROCEDURE — 84484 ASSAY OF TROPONIN QUANT: CPT

## 2021-11-11 PROCEDURE — 96361 HYDRATE IV INFUSION ADD-ON: CPT

## 2021-11-11 PROCEDURE — 93010 ELECTROCARDIOGRAM REPORT: CPT | Performed by: INTERNAL MEDICINE

## 2021-11-11 PROCEDURE — 96367 TX/PROPH/DG ADDL SEQ IV INF: CPT

## 2021-11-11 PROCEDURE — 6360000002 HC RX W HCPCS: Performed by: INTERNAL MEDICINE

## 2021-11-11 PROCEDURE — 83735 ASSAY OF MAGNESIUM: CPT

## 2021-11-11 PROCEDURE — 87641 MR-STAPH DNA AMP PROBE: CPT

## 2021-11-11 PROCEDURE — 6360000002 HC RX W HCPCS: Performed by: EMERGENCY MEDICINE

## 2021-11-11 PROCEDURE — 99223 1ST HOSP IP/OBS HIGH 75: CPT | Performed by: PEDIATRICS

## 2021-11-11 PROCEDURE — 2500000003 HC RX 250 WO HCPCS: Performed by: INTERNAL MEDICINE

## 2021-11-11 PROCEDURE — 96365 THER/PROPH/DIAG IV INF INIT: CPT

## 2021-11-11 PROCEDURE — 83605 ASSAY OF LACTIC ACID: CPT

## 2021-11-11 PROCEDURE — 36415 COLL VENOUS BLD VENIPUNCTURE: CPT

## 2021-11-11 PROCEDURE — 87500 VANOMYCIN DNA AMP PROBE: CPT

## 2021-11-11 PROCEDURE — 2060000000 HC ICU INTERMEDIATE R&B

## 2021-11-11 PROCEDURE — 87081 CULTURE SCREEN ONLY: CPT

## 2021-11-11 PROCEDURE — 2580000003 HC RX 258: Performed by: EMERGENCY MEDICINE

## 2021-11-11 RX ORDER — ACETAMINOPHEN 325 MG/1
650 TABLET ORAL EVERY 6 HOURS PRN
Status: DISCONTINUED | OUTPATIENT
Start: 2021-11-11 | End: 2021-11-28 | Stop reason: HOSPADM

## 2021-11-11 RX ORDER — ASPIRIN 81 MG/1
81 TABLET, CHEWABLE ORAL DAILY
Status: DISCONTINUED | OUTPATIENT
Start: 2021-11-11 | End: 2021-11-28 | Stop reason: HOSPADM

## 2021-11-11 RX ORDER — SODIUM CHLORIDE 9 MG/ML
25 INJECTION, SOLUTION INTRAVENOUS PRN
Status: DISCONTINUED | OUTPATIENT
Start: 2021-11-11 | End: 2021-11-28 | Stop reason: HOSPADM

## 2021-11-11 RX ORDER — ACETAMINOPHEN 650 MG/1
650 SUPPOSITORY RECTAL EVERY 6 HOURS PRN
Status: DISCONTINUED | OUTPATIENT
Start: 2021-11-11 | End: 2021-11-28 | Stop reason: HOSPADM

## 2021-11-11 RX ORDER — POLYETHYLENE GLYCOL 3350 17 G/17G
17 POWDER, FOR SOLUTION ORAL DAILY PRN
Status: DISCONTINUED | OUTPATIENT
Start: 2021-11-11 | End: 2021-11-28 | Stop reason: HOSPADM

## 2021-11-11 RX ORDER — SODIUM CHLORIDE 0.9 % (FLUSH) 0.9 %
10 SYRINGE (ML) INJECTION PRN
Status: DISCONTINUED | OUTPATIENT
Start: 2021-11-11 | End: 2021-11-22

## 2021-11-11 RX ORDER — ONDANSETRON 4 MG/1
4 TABLET, ORALLY DISINTEGRATING ORAL EVERY 8 HOURS PRN
Status: DISCONTINUED | OUTPATIENT
Start: 2021-11-11 | End: 2021-11-28 | Stop reason: HOSPADM

## 2021-11-11 RX ORDER — SODIUM CHLORIDE 0.9 % (FLUSH) 0.9 %
10 SYRINGE (ML) INJECTION EVERY 12 HOURS SCHEDULED
Status: DISCONTINUED | OUTPATIENT
Start: 2021-11-11 | End: 2021-11-28 | Stop reason: HOSPADM

## 2021-11-11 RX ORDER — MAGNESIUM HYDROXIDE/ALUMINUM HYDROXICE/SIMETHICONE 120; 1200; 1200 MG/30ML; MG/30ML; MG/30ML
30 SUSPENSION ORAL EVERY 6 HOURS PRN
Status: DISCONTINUED | OUTPATIENT
Start: 2021-11-11 | End: 2021-11-28 | Stop reason: HOSPADM

## 2021-11-11 RX ORDER — SODIUM CHLORIDE 9 MG/ML
INJECTION, SOLUTION INTRAVENOUS CONTINUOUS
Status: DISCONTINUED | OUTPATIENT
Start: 2021-11-11 | End: 2021-11-11

## 2021-11-11 RX ORDER — LEVOTHYROXINE SODIUM 0.05 MG/1
50 TABLET ORAL DAILY
Status: DISCONTINUED | OUTPATIENT
Start: 2021-11-11 | End: 2021-11-18

## 2021-11-11 RX ORDER — LABETALOL 20 MG/4 ML (5 MG/ML) INTRAVENOUS SYRINGE
10 ONCE
Status: DISCONTINUED | OUTPATIENT
Start: 2021-11-11 | End: 2021-11-11

## 2021-11-11 RX ORDER — AMLODIPINE BESYLATE 10 MG/1
10 TABLET ORAL DAILY
Status: DISCONTINUED | OUTPATIENT
Start: 2021-11-11 | End: 2021-11-28 | Stop reason: HOSPADM

## 2021-11-11 RX ORDER — ONDANSETRON 2 MG/ML
4 INJECTION INTRAMUSCULAR; INTRAVENOUS EVERY 6 HOURS PRN
Status: DISCONTINUED | OUTPATIENT
Start: 2021-11-11 | End: 2021-11-28 | Stop reason: HOSPADM

## 2021-11-11 RX ORDER — SODIUM CHLORIDE 9 MG/ML
INJECTION, SOLUTION INTRAVENOUS CONTINUOUS
Status: DISCONTINUED | OUTPATIENT
Start: 2021-11-11 | End: 2021-11-14

## 2021-11-11 RX ADMIN — ASPIRIN 81 MG CHEWABLE TABLET 81 MG: 81 TABLET CHEWABLE at 09:44

## 2021-11-11 RX ADMIN — AMLODIPINE BESYLATE 10 MG: 10 TABLET ORAL at 12:43

## 2021-11-11 RX ADMIN — MEROPENEM 1000 MG: 1 INJECTION, POWDER, FOR SOLUTION INTRAVENOUS at 22:10

## 2021-11-11 RX ADMIN — CEFTRIAXONE SODIUM 1000 MG: 1 INJECTION, POWDER, FOR SOLUTION INTRAMUSCULAR; INTRAVENOUS at 01:51

## 2021-11-11 RX ADMIN — AZITHROMYCIN MONOHYDRATE 500 MG: 500 INJECTION, POWDER, LYOPHILIZED, FOR SOLUTION INTRAVENOUS at 12:43

## 2021-11-11 RX ADMIN — LEVOTHYROXINE SODIUM 50 MCG: 0.05 TABLET ORAL at 14:01

## 2021-11-11 RX ADMIN — SODIUM CHLORIDE: 9 INJECTION, SOLUTION INTRAVENOUS at 01:56

## 2021-11-11 RX ADMIN — MICONAZOLE NITRATE: 20 POWDER TOPICAL at 22:05

## 2021-11-11 RX ADMIN — SODIUM CHLORIDE: 9 INJECTION, SOLUTION INTRAVENOUS at 10:48

## 2021-11-11 RX ADMIN — ENOXAPARIN SODIUM 30 MG: 30 INJECTION SUBCUTANEOUS at 12:44

## 2021-11-11 RX ADMIN — SODIUM CHLORIDE, PRESERVATIVE FREE 10 ML: 5 INJECTION INTRAVENOUS at 22:05

## 2021-11-11 NOTE — ED NOTES
telesitter monitor placed in pt room at this time for continuous supervision due to pt removing IV access.      Kenan Cao, ANNIE  11/11/21 [address], RN  11/11/21 4968

## 2021-11-11 NOTE — TELEPHONE ENCOUNTER
Pts son just wanted to let you know he had to call the squad last night for his dad  Pt is admitted @ [facility]  He wanted you to know his TSH is at an all-time high for him  TSH with Reflex  Order: [id number]   Status: Final result     Visible to patient: Yes (seen)     Next appt: None     0 Result Notes     1  Topic     Ref Range & Units 11/10/21 2235   TSH 0.400 - 4.200 uIU/mL 126.800 High

## 2021-11-11 NOTE — ED NOTES
Called 4k to notify that pt would be up soon. Pt transported by cart, ca.       Rylie Kidney  11/11/21 2332

## 2021-11-11 NOTE — ED NOTES
Bed: 022A  Expected date:   Expected time:   Means of arrival:   Comments:     Radha Louis  11/10/21 7521

## 2021-11-11 NOTE — ED NOTES
ED to inpatient nurses report    Chief Complaint   Patient presents with    Altered Mental Status      Present to ED from home  LOC: alert and orientated to name, place, date  Vital signs   Vitals:    11/10/21 2223 11/10/21 2326 11/10/21 2354 11/11/21 0132   BP: (!) 150/98 (!) 138/100 (!) 161/76 (!) 168/99   Pulse: 99 85 80 76   Resp: 16 17 14 17   Temp: 98.7 °F (37.1 °C)      TempSrc: Oral      SpO2: 99%  98% 98%   Weight: 185 lb (83.9 kg)      Height: 6' (1.829 m)         Oxygen Baseline RA    Current needs required RA   LDAs:   Peripheral IV 11/10/21 Left Forearm (Active)   Site Assessment Clean; Dry; Intact 11/10/21 2308   Line Status Blood return noted; Brisk blood return; Flushed 11/10/21 2308   Dressing Status Clean; Dry; Intact 11/10/21 2308   Dressing Intervention New 11/10/21 2308     Mobility: Requires assistance * 2  Pending ED orders: Complete  Present condition: Stable  Person of contract, Ta Jones (Son) , phone number  [phone]      Electronically signed by Sam Hernandez RN on 11/11/2021 at 3:07 AM       Sam Hernandez RN  11/11/21 4639

## 2021-11-11 NOTE — ED TRIAGE NOTES
Patient presents to the ED via EMS with complaints of altered mental status that has been ongoing for the last two days. EMS state that the patient's son ( who is at bedside) takes care of him. Patient's son had gotten his father to the bathtub and was expecting a call from the patient when he got out of the tub. After approximately 3 hours of no phone call from the patient, his son called EMS who found the patient in the bathtub with no water in it. Patient has a history of urinary problems as well as TURP procedures. Patient's son at bedside states that he has been intermittently confused within the last few days and has not seen him urinate. Patient's son states the patient is intermittently incontinent. EKG completed, urine, and labs collected at bedside.

## 2021-11-11 NOTE — ED NOTES
Patient resting in semi fowlers position, breathing easy and unlabored. Family at bedside. VSS. Call light within reach. No new concerns voiced at this time. Will continue to monitor. Pose alarm in place for patient safety.       Marlen Doherty RN  11/11/21 1930

## 2021-11-11 NOTE — ED NOTES
Patient resting on cot with telesitter at bedside and posey alarm applied for patient's safety.      [address], RN  11/11/21 8811

## 2021-11-11 NOTE — ED NOTES
ED nurse-to-nurse bedside report    Chief Complaint   Patient presents with    Altered Mental Status      LOC: alert to place and situation  Vital signs   Vitals:    11/11/21 0132 11/11/21 0306 11/11/21 0321 11/11/21 0620   BP: (!) 168/99 (!) 161/93 (!) 178/88 (!) 153/63   Pulse: 76 72 72 85   Resp: 17 18 14 16   Temp:       TempSrc:       SpO2: 98%   96%   Weight:       Height:          Pain:    Pain Interventions: N/A  Pain Goal: N/A  Oxygen: No    Current needs required RA   Telemetry: Yes  LDAs:   Peripheral IV 11/11/21 Left Forearm (Active)   Site Assessment Clean; Dry; Intact 11/11/21 0444   Line Status Blood return noted 11/11/21 0444   Dressing Status Clean; Intact; Dry 11/11/21 0444     Continuous Infusions:    sodium chloride Stopped (11/11/21 0443)     Mobility: Requires assistance * 2  Almazan Fall Risk Score: Fall Risk 10/11/2021 7/16/2021 10/12/2020 1/13/2020 1/4/2019   2 or more falls in past year? no no no no no   Fall with injury in past year? no no no no no     Fall Interventions: Side rails up x2, pose alarm, and telesitter  Report given to:  ANNIE Root RN  11/11/21 [address] [facility], RN  11/11/21 3479

## 2021-11-11 NOTE — ED NOTES
Patient resting in supine position, breathing easy and unlabored. Call light within reach. Telesitter monitor in place at this time for continuous supervision. Pose alarm attached. No new concerns voiced.       Giuliana Montes RN  11/11/21 6897

## 2021-11-11 NOTE — ED NOTES
Patient resting in supine position, breathing easy and unlabored. Family at bedside, call light within reach. Pose alarm in place. Will continue to monitor.       Haven Guerra RN  11/11/21 4170

## 2021-11-11 NOTE — ED PROVIDER NOTES
[address] ENCOUNTER      PATIENT NAME: Emily Castaneda  MRN: [id number]  : 1926  [place]: 11/10/2021  PROVIDER: Ronald Beckett MD      CHIEF COMPLAINT       Chief Complaint   Patient presents with    Altered Mental Status       Patient is seen and evaluated in a timely fashion. Nurses Notes are reviewed and I agree except as noted in the HPI. HISTORY OF PRESENT ILLNESS    Emily Castaneda is a 80 y.o. male who presents to Emergency Department with Altered Mental Status     This patient is a 58-year-old male who lives at home and is independent brought into ED by EMS because of confusion and diffuse weakness. This was noticed by his son tonight. Patient was in the bathtube, too weak to get out by himself. Past medical history remarkable for prostate cancer with multiple TURPs, hypertension, hyperlipidemia, and hypothyroidism. Patient had frequent UTI. His son is concerned that the patient may have not taking Cipro and Synthroid as directed. Patient has no fever or chills. No cough. No nausea or vomiting. No abdominal pain. Patient complains groin and perineal pain with skin excoriation. No fall at home. This HPI was provided by son.      REVIEW OF SYSTEMS   Ten-point review of systems is negative except those documented in above HPI including constitutional, HEENT, respiratory, cardiovascular, gastrointestinal, genitourinary, musculoskeletal, skin, neurological, hematological and behavioral.      PAST MEDICAL HISTORY     Past Medical History:   Diagnosis Date    Cancer [facility])     Prostate    Hyperlipidemia     Hypertension     Kidney stone     Thyroid disease        SURGICAL HISTORY       Past Surgical History:   Procedure Laterality Date    ABDOMEN SURGERY      CYSTOSCOPY N/A 2019    CYSTOSCOPY TRANSURETHRAL RESECTION OF PROSTATE AND BLADDER  NECK performed by Katherine Luevano MD at [address] 2019    CYSTOSCOPY, EVACUATION OF CLOTS, FULGERATION OF BLEEDERS performed by Yolie Davis MD at [facility] Right 2017    with mesh--Dr. Fernando Youssef    OTHER SURGICAL HISTORY Right     Bad sprain    PROSTATE BIOPSY      prostate cancer with radiation    TURP  2017       CURRENT MEDICATIONS       Previous Medications    AMLODIPINE (NORVASC) 10 MG TABLET    Take 1 tablet by mouth daily    CIPROFLOXACIN (CIPRO) 500 MG TABLET    250 mg     LEVOTHYROXINE (SYNTHROID) 50 MCG TABLET    Take 1 tablet by mouth daily    OXYCODONE-ACETAMINOPHEN (PERCOCET) 5-325 MG PER TABLET    take 1 tablet by mouth every 8 hours if needed for pain for up to 3 days       ALLERGIES     Penicillins    FAMILY HISTORY     He indicated that his mother is . He indicated that his father is . family history includes Cancer (age of onset: 80) in his mother; High Blood Pressure in his father and mother; Stroke in his mother. SOCIAL HISTORY      reports that he has never smoked. He has never used smokeless tobacco. He reports current alcohol use. He reports that he does not use drugs. PHYSICAL EXAM      height is 6' (1.829 m) and weight is 185 lb (83.9 kg). His oral temperature is 98.7 °F (37.1 °C). His blood pressure is 168/99 (abnormal) and his pulse is 76. His respiration is 17 and oxygen saturation is 98%. Physical Exam  Vitals and nursing note reviewed. Constitutional:       Appearance: He is well-developed. He is not diaphoretic. HENT:      Head: Normocephalic and atraumatic. Nose: Nose normal.   Eyes:      General: No scleral icterus. Right eye: No discharge. Left eye: No discharge. Conjunctiva/sclera: Conjunctivae normal.      Pupils: Pupils are equal, round, and reactive to light. Neck:      Vascular: No JVD. Trachea: No tracheal deviation. Cardiovascular:      Rate and Rhythm: Normal rate and regular rhythm.       Heart sounds: Normal Hematocrit 45.0 42.0 - 52.0 %    .2 (H) 80.0 - 94.0 fL    MCH 35.8 (H) 26.0 - 33.0 pg    MCHC 34.7 32.2 - 35.5 gm/dl    RDW-CV 13.1 11.5 - 14.5 %    RDW-SD 49.8 (H) 35.0 - 45.0 fL    Platelets 087 708 - 641 thou/mm3    MPV 9.5 9.4 - 12.4 fL    Seg Neutrophils 68.6 %    Lymphocytes 21.6 %    Monocytes 7.6 %    Eosinophils 1.5 %    Basophils 0.4 %    Immature Granulocytes 0.3 %    Segs Absolute 4.6 1.8 - 7.7 thou/mm3    Lymphocytes Absolute 1.4 1.0 - 4.8 thou/mm3    Monocytes Absolute 0.5 0.4 - 1.3 thou/mm3    Eosinophils Absolute 0.1 0.0 - 0.4 thou/mm3    Basophils Absolute 0.0 0.0 - 0.1 thou/mm3    Immature Grans (Abs) 0.02 0.00 - 0.07 thou/mm3    nRBC 0 /100 wbc   Comprehensive Metabolic Panel   Result Value Ref Range    Glucose 99 70 - 108 mg/dL    CREATININE 2.1 (H) 0.4 - 1.2 mg/dL    BUN 33 (H) 7 - 22 mg/dL    Sodium 140 135 - 145 meq/L    Potassium 4.5 3.5 - 5.2 meq/L    Chloride 101 98 - 111 meq/L    CO2 20 (L) 23 - 33 meq/L    Calcium 10.2 8.5 - 10.5 mg/dL    AST 34 5 - 40 U/L    Alkaline Phosphatase 89 38 - 126 U/L    Total Protein 7.7 6.1 - 8.0 g/dL    Albumin 4.6 3.5 - 5.1 g/dL    Total Bilirubin 0.9 0.3 - 1.2 mg/dL    ALT 14 11 - 66 U/L   Lactic acid, plasma   Result Value Ref Range    Lactic Acid 1.9 0.5 - 2.0 mmol/L   Brain Natriuretic Peptide   Result Value Ref Range    Pro-BNP 1416.0 0.0 - 1800.0 pg/mL   TSH with Reflex   Result Value Ref Range    .800 (H) 0.400 - 4.200 uIU/mL   Troponin   Result Value Ref Range    Troponin T 0.057 (A) ng/ml   Procalcitonin   Result Value Ref Range    Procalcitonin 0.10 (H) 0.01 - 0.09 ng/mL   Ammonia   Result Value Ref Range    Ammonia 45 11 - 60 umol/L   Anion Gap   Result Value Ref Range    Anion Gap 19.0 (H) 8.0 - 16.0 meq/L   Osmolality   Result Value Ref Range    Osmolality Calc 286.7 275.0 - 300.0 mOsmol/kg   Glomerular Filtration Rate, Estimated   Result Value Ref Range    Est, Glom Filt Rate 29 (A) ml/min/1.73m2   Urine with Reflexed Micro Result Value Ref Range    Glucose, Ur NEGATIVE NEGATIVE mg/dl    Bilirubin Urine SMALL (A) NEGATIVE    Ketones, Urine TRACE (A) NEGATIVE    Specific Gravity, Urine 1.026 1.002 - 1.030    Blood, Urine LARGE (A) NEGATIVE    pH, UA 6.5 5.0 - 9.0    Protein,  (A) NEGATIVE    Urobilinogen, Urine 1.0 0.0 - 1.0 eu/dl    Nitrite, Urine NEGATIVE NEGATIVE    Leukocyte Esterase, Urine LARGE (A) NEGATIVE    Color, UA ORANGE (A) STRAW-YELLOW    Character, Urine TURBID (A) CLEAR-SL CLOUD    RBC, UA OBSCURED 0-2/hpf /hpf    WBC, UA OBSCURED 0-4/hpf /hpf    Epithelial Cells, UA 5-10 3-5/hpf /hpf    Bacteria, UA FEW FEW/NONE SEEN /hpf    Casts UA >15 HYALINE NONE SEEN /lpf    Crystals, UA NONE SEEN NONE SEEN    Renal Epithelial, UA NONE SEEN NONE SEEN    Yeast, UA NONE SEEN NONE SEEN    CASTS 2 NONE SEEN NONE SEEN /lpf    MISCELLANEOUS 2 NONE SEEN    Bile Acids, Total   Result Value Ref Range    Ictotest NEGATIVE NEGATIVE   EKG Altered Mental Status   Result Value Ref Range    Ventricular Rate 92 BPM    Atrial Rate 92 BPM    P-R Interval 224 ms    QRS Duration 72 ms    Q-T Interval 346 ms    QTc Calculation (Bazett) 427 ms    P Axis 42 degrees    R Axis -40 degrees    T Axis 13 degrees       RADIOLOGY REPORTS  CT HEAD WO CONTRAST   Final Result   FINDINGS: Generalized moderate to severe volume loss. Chronic small vessel ischemic disease changes. No hemorrhage. No acute infarction. Ventricles are normal. Marked calcific atherosclerosis of the vertebral arteries. Calcific atherosclerosis of the    cavernous portions of the internal carotid arteries. Calvarium is intact. Visualized paranasal sinuses and mastoid air cells are clear. Deviation of the nasal septum. IMPRESSION:  No acute intracranial pathology            **This report has been created using voice recognition software. It may contain minor errors which are inherent in voice recognition technology. **      Final report electronically signed by Dr. Coco Huntley on 11/11/2021 12:32 AM      XR CHEST PORTABLE   Final Result   Left lung infiltrates. **This report has been created using voice recognition software. It may contain minor errors which are inherent in voice recognition technology. **      Final report electronically signed by Dr. Sid Lora on 11/10/2021 11:51 PM          ED COURSE AND MEDICAL Dericdoyle Pavon [id number] ([facility])   10:56 PM EST   INITIAL ASSESSMENT AND PLAN  Differential Diagnosis: UTI, pneumonia, dehydration, encephalopathy, CVA, dementia  Plan: Large-bore IV, normal saline bolus, labs, CT head, chest x-ray, ECG, plan to admit    [facility]  Vitals:    11/10/21 2223 11/10/21 2326 11/10/21 2354 11/11/21 0132   BP: (!) 150/98 (!) 138/100 (!) 161/76 (!) 168/99   Pulse: 99 85 80 76   Resp: 16 17 14 17   Temp: 98.7 °F (37.1 °C)      TempSrc: Oral      SpO2: 99%  98% 98%   Weight: 185 lb (83.9 kg)      Height: 6' (1.829 m)          Medications   0.9 % sodium chloride infusion (has no administration in time range)   cefTRIAXone (ROCEPHIN) 1000 mg IVPB in 50 mL D5W minibag (1,000 mg IntraVENous New Bag 11/11/21 0151)   0.9 % sodium chloride bolus (0 mL/kg × 83.9 kg IntraVENous Stopped 11/11/21 0132)     Stable ED stay, vital signs are stable. Patient is pleasantly confused. ECG has no acute ischemic changes. Chest x-ray shows left lower lobe infiltrate but patient has no pulmonary symptoms. Head CT has no acute intracranial findings. Pertinent labs: Flu negative, Covid-19 negative, WBC 6.7, BUN/creatinine 33/2.1 (base line Cr 1.6), lactic acid 1.9, UA has large leukocyte with RBC and WBC obscured. IV Rocephin is given. Patient's altered mental status seems to be multifactorial including UTI and VADIM. CVA is also is possibility, but I am reluctant to perform CTAs head and neck given patient's GFR now is 29, I feel a brain MRI after admission is appropriate. Consulted and admitted by hospitalist service.     Addendum: TSH and troponin elevation are reviewed. No clinical findings suggesting myxedema. Hospitalist is updated. CRITICAL CARE   None    CONSULTS   Dr. Daria Cabrera   None    FINAL IMPRESSION AND DISPOSITION      1. Altered mental status, unspecified altered mental status type    2. Urinary tract infection in male    3. VADIM (acute kidney injury) ([facility] [address]    4. Hypothyroidism, unspecified type    5. Troponin level elevated        DISPOSITION Decision To Admit 11/11/2021 01:45:44 AM        PATIENT REFERRED TO:  No follow-up provider specified.     DISCHARGE MEDICATIONS:  New Prescriptions    No medications on file       (Please note that portions of this note were completed with a voice recognition program.  Efforts were made to edit the dictations but occasionally words aremis-transcribed.)    MD Carly Espinoza MD  11/11/21 [address] MD Maritza  11/11/21 1998

## 2021-11-11 NOTE — ED NOTES
Upon first contact with patient this RN receives bedside shift report from ANNIE Rojo.      [address], RN  11/11/21 8903

## 2021-11-11 NOTE — PLAN OF CARE
[facility]    Name: Kathy Green  : 1926  MRN: [id number]  Date of Service: 21      7:22 AM: Kathy Green is a 80 y.o. male who was seen after midnight on 21. Kathy Green has a PMHX of prostate cancer s/p radiation, HTN, chronic bladder stones, dementia, HLD, and hypothyroidism. He was admitted to [facility] for complaint of altered mental status decreased interaction, and inability to care for himself as reported by the patient's son. The patient lives alone and is checked on by his son frequently. On the night of complaint the patient was not answering his phone and the patient's son went to his home and found him in the bathroom in a disoriented state. There was no evidence of obvious injury. The patient was transported to [facility] for further care and management. Per interview with the patients daughter the patient sees Dr. Roro Chase (urology) outpatient for chronic bladder stones and UTI. He takes ciprofloxacin daily and has done so for immunosuppressive purposes. The patient was stable on exam and all questions and concerns were addressed at bedside with the patient and the patient's daughter Polly Garland. 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 Multiple previous cultures show E faecalis, Strep agalactiae, Staph aureus, and Staph epi. Received ceftriaxone on admission  - Switch to meropenem for possible ESBL and follow cultures for sensitivities. Hypothyroidism - non-adherence to medication. TSH=126.8, FreeT4=0.56. Per patients daughter the patient has had iatrogenic destruction of the thyroid, but uncertain of exact details. This was also unconfirmed via chart review. - Resume levothyroxine    Stage 2 VADIM on CKD stage 3b - Cr=2.1 on baseline of 1.6. GFR=29  - Avoid nephrotoxic agents and renally dose medications  - Daily standing weights, strict I/O  - Daily BMP     HTN - Controlled.   - Continue amlodipine with holding parameters    Hx of prostate cancer - s/p radiation in 2000. Hx of bladder stones - Noted. Follows with Dr. Pat Jean (urology) outpatient. Code status - Patient's daughter and son are POA. They will discuss possibility of code change and requested I follow-up tomorrow (11/12/21) with them regarding this.     Case discussed with Dr. Lissette Coy    Electronically signed by Paco Flores DO on 11/11/2021 at 7:22 AM

## 2021-11-11 NOTE — H&P
Hospitalist History and Physical          Patient: Franca Shen  : 1926  MRN: [id number]     Acct: [de-identified]    PCP: OLGA Wyman - CNP  Date of Admission: 11/10/2021  Date of Service: Pt seen/examined on 21  and Admitted to Inpatient with expected LOS greater than two midnights due to medical therapy. Hospital Problems           Last Modified POA    Sepsis secondary to UTI ([address] 2021 Yes          Assessment and Plan:    1. Sepsis secondary to UTI/pneumonia:  On presentation patient with tachycardia, tachypnea, and 2 identified sources of infection, meeting SIRS criteria for sepsis. Also 2 out of 3 qSOFA criteria for sepsis (tachypnea and altered mental status). Lactic acid at 1.9  IV antibiotic coverage for UTI and community-acquired pneumonia (Rocephin and azithromycin). Follow-up blood, urine, and sputum cultures. Legionella and Streptococcus urine antigen. Repeat lactic acid now. Repeat CBC in a.m.    2.  UTI:  Strongly positive urinalysis. Empiric Rocephin. Follow urine cultures. 3.  Left upper lobe and lingular pneumonia:  No hypoxia with good O2 sats on room air. Empiric coverage with Rocephin and azithromycin for commune acquired pneumonia. Follow-up sputum and blood cultures as well as Legionella and Streptococcus urine antigen. 4.  Uncontrolled hypothyroidism:  Severely elevated TSH. Questionable compliance with taking home medications or following appropriate instructions when taking levothyroxine. Resumed home dose levothyroxine at 50 mcg daily. Administer levothyroxine in a.m. without food and wait 1 hour prior to given food. Levothyroxine not to be administered with iron or other mineral supplementations. Repeat TSH and free T4 in 2 weeks. 5.  Acute kidney injury on chronic kidney disease stage III:  Secondary to sepsis and dehydration. IV fluid hydration continue. Repeat renal function in a.m. and correct electrolytes.   Avoid nephrotoxic medications. 6.  Altered mental status:  Likely toxic metabolic encephalopathy secondary to sepsis, dehydration, and severe hypothyroidism. Neurochecks every 4 hours. Fall and aspiration precautions. Treat underlying causes. Avoid narcotics, sedatives, and all mentation altering medications. 7.  Hypertension:  Suboptimal control at this time. Resumed home dose Norvasc. As needed hydralazine with parameters. 8.  Elevated troponins:  Likely demand ischemia that is secondary to sepsis and VADIM. Serial troponins, telemetry, and repeat EKG as needed. No history of coronary disease, patient asymptomatic with no chest pain. Low-dose aspirin for now. 9.  GI/DVT prophylaxis:  PPI and subcu Lovenox. 10.  Debility and physical deconditioning:  Secondary to all of the above. PT/OT evaluation and treatment to assess patient's safety for discharge home versus temporary placement.  consult for discharge planning and assistance with possible placement versus home health services. 11. Fluids/electrolytes/nutrition:  Normal saline at 75 cc an hour. Electrolytes within normal limits. Check magnesium. Regular diet.    =======================================================================      Chief Complaint: Altered mental status. History Of Present Illness:  Kathy Green is a 80 y.o. male with PMHx of prostate cancer status post TURP with history of UTIs, hypertension, hyperlipidemia, hypothyroidism, nephrolithiasis, and osteoarthritis who was brought into the emergency room here at [facility] by EMS on 11/10/2021 with complaints of about a 2-day history of altered mentation increased confusion decreased interaction and inability to care for himself like he normally did. At the time of evaluation and interview in the ER patient disoriented to time and unable to provide much information about why he is in the emergency room and what happened at home. He states that he is just unable to get it altogether for some reason. Per emergency staff notes, patient was brought in because his son realized that patient was not acting himself with increased confusion, decreased interaction and decreased energy, staying in the bathtub for 3 hours with no water despite being instructed by the son to call after he was done showering. Son also is unsure about whether patient has been taking his medications at home lately and whether he has been taking them correctly. It appears that patient has been living alone but failing to care for herself and needs higher level of care at this time. Denies any nausea vomiting abdominal pain or diarrhea. No fevers or chills or sore throat or shortness of breath. No chest pain. In the emergency room patient was afebrile 98.7, tachycardic 99, respirate of 16-18, blood pressure 150/98 with 90% oxygen saturation on room air. Laboratory vesication's were positive for an elevated serum creatinine of 2.1 from a baseline of 1.6, lactic acid of 1.9, pro-Red of 0.1, troponin of 0.057, and a significantly increased TSH of 126.8 with a low free T4 of 0.56. CBC was normal.  Urinalysis was strongly positive for UTI plus hyaline casts. Chest x-ray showed a left upper and lingular lobe pneumonia or infiltrate. Twelve-lead EKG with sinus rhythm and first-degree AV block but no acute ST segment elevation or depression. Patient received ceftriaxone 1 g IV x1 and 2.5 L IV fluid boluses. Patient was then admitted to our hospitalist service for further evaluation and treatment. At the time of evaluation in the ER patient was laying in bed comfortably in no acute distress. Able to follow commands and answer simple questions and name his date of birth but disoriented and unable to provide any details about current or recent events.     Past Medical History:        Diagnosis Date    Cancer [facility]) 2000    Prostate    Hyperlipidemia     Hypertension  Kidney stone     Thyroid disease        Past Surgical History:        Procedure Laterality Date    ABDOMEN SURGERY      CYSTOSCOPY N/A 5/8/2019    CYSTOSCOPY TRANSURETHRAL RESECTION OF PROSTATE AND BLADDER  NECK performed by Osman Gordon MD at [address] 5/31/2019    CYSTOSCOPY, EVACUATION OF CLOTS, FULGERATION OF BLEEDERS performed by Lamar Smith MD at [place] Right 03/30/2017    with mesh--Dr. Stevenson Jordan    OTHER SURGICAL HISTORY Right 2007    Bad sprain    PROSTATE BIOPSY      prostate cancer with radiation    TURP  03/30/2017       Medications Prior to Admission:   Prior to Admission medications    Medication Sig Start Date End Date Taking? Authorizing Provider   levothyroxine (SYNTHROID) 50 MCG tablet Take 1 tablet by mouth daily 7/19/21   OLGA Jernigan CNP   ciprofloxacin (CIPRO) 500 MG tablet 250 mg  10/6/20   Historical Provider, MD   oxyCODONE-acetaminophen (PERCOCET) 5-325 MG per tablet take 1 tablet by mouth every 8 hours if needed for pain for up to 3 days 10/3/20   Historical Provider, MD   amLODIPine (NORVASC) 10 MG tablet Take 1 tablet by mouth daily 9/14/20   OLGA Jernigan CNP       Allergies:  Penicillins    Social History:    The patient currently lives alone he says and normally is capable of caring for himself with a lot of assistance from his son. Uses a walker for ambulation. Tobacco use:   reports that he has never smoked. He has never used smokeless tobacco.  Alcohol use:   reports current alcohol use. Drug use:  reports no history of drug use. Family History:   as follows:      Problem Relation Age of Onset    Cancer Mother 80    Stroke Mother     High Blood Pressure Mother     High Blood Pressure Father        Review of Systems:   Pertinent positives and negatives as noted in the HPI. All other systems reviewed and negative.     Physical Exam:    BP (!) 153/63   Pulse 85   Temp 98.7 °F (37.1 °C) (Oral)   Resp 16   Ht 6' (1.829 m)   Wt 185 lb (83.9 kg)   SpO2 96%   BMI 25.09 kg/m²       General appearance: No apparent distress, well developed, appears stated age. Eyes:  Conjunctivae/corneas clear. HENT: Head normal in appearance. External nares normal.  Oral mucosa moist without lesions. Hearing grossly intact. Neck: Supple, with full range of motion. Trachea midline. No gross JVD appreciated. Respiratory:  Normal respiratory effort. Clear to auscultation, bilaterally without rales or wheezes or rhonchi. Cardiovascular: Normal rate, regular rhythm with normal S1/S2 without murmurs. No lower extremity edema. Abdomen: Soft, non-tender, non-distended with normal bowel sounds. Musculoskeletal: There is no joint swelling or tenderness. Normal tone. No abnormal movements. Skin: Warm and dry. No rashes or lesions. Neurologic:  No focal sensory/motor deficits in the upper and lower extremities. Cranial nerves:  grossly non-focal 2-12. Psychiatric: Alert and disoriented, able to follow simple commands and answer yes or no questions. Inability to recall recent events, short-term memory loss. Capillary Refill: Brisk,< 3 seconds. Peripheral Pulses: +2 palpable, equal bilaterally. Labs:     Recent Labs     11/10/21  2235   WBC 6.7   HGB 15.6   HCT 45.0        Recent Labs     11/10/21  2235      K 4.5      CO2 20*   BUN 33*   CREATININE 2.1*   CALCIUM 10.2     Recent Labs     11/10/21  2235   AST 34   ALT 14   BILITOT 0.9   ALKPHOS 89     No results for input(s): INR in the last 72 hours. No results for input(s): [facility] in the last 72 hours.   Lab Results   Component Value Date    NITRU NEGATIVE 11/10/2021    WBCUA OBSCURED 11/10/2021    BACTERIA FEW 11/10/2021    RBCUA OBSCURED 11/10/2021    BLOODU LARGE 11/10/2021    SPECGRAV >=1.030 01/13/2020    SPECGRAV 1.013 01/14/2017    GLUCOSEU NEGATIVE 11/10/2021         Radiology:     CT HEAD WO CONTRAST   Final Result   FINDINGS: Generalized moderate to severe volume loss. Chronic small vessel ischemic disease changes. No hemorrhage. No acute infarction. Ventricles are normal. Marked calcific atherosclerosis of the vertebral arteries. Calcific atherosclerosis of the    cavernous portions of the internal carotid arteries. Calvarium is intact. Visualized paranasal sinuses and mastoid air cells are clear. Deviation of the nasal septum. IMPRESSION:  No acute intracranial pathology            **This report has been created using voice recognition software. It may contain minor errors which are inherent in voice recognition technology. **      Final report electronically signed by Dr. Anisa Hammond on 11/11/2021 12:32 AM      XR CHEST PORTABLE   Final Result   Left lung infiltrates. **This report has been created using voice recognition software. It may contain minor errors which are inherent in voice recognition technology. **      Final report electronically signed by Dr. Anisa Hammond on 11/10/2021 11:51 PM             EKG:  I have reviewed the EKG with the following interpretation: Normal sinus rhythm with first-degree AV block. No ischemic changes. PT/OT Eval Status:  will be assessed  Diet: No diet orders on file  DVT prophylaxis: Subcu Lovenox. Code Status: Prior  Disposition: admit to inpatient [facility] with telemetry. Thank you OLGA St CNP for the opportunity to be involved in this patient's care.     Electronically signed by Stephanie Bonilla MD on 11/11/2021 at 7:01 AM.

## 2021-11-12 LAB
ANION GAP SERPL CALCULATED.3IONS-SCNC: 11 MEQ/L (ref 8–16)
BASOPHILS # BLD: 0.7 %
BASOPHILS ABSOLUTE: 0 THOU/MM3 (ref 0–0.1)
BUN BLDV-MCNC: 29 MG/DL (ref 7–22)
CALCIUM SERPL-MCNC: 8.5 MG/DL (ref 8.5–10.5)
CHLORIDE BLD-SCNC: 109 MEQ/L (ref 98–111)
CO2: 21 MEQ/L (ref 23–33)
CREAT SERPL-MCNC: 1.5 MG/DL (ref 0.4–1.2)
EOSINOPHIL # BLD: 4.2 %
EOSINOPHILS ABSOLUTE: 0.3 THOU/MM3 (ref 0–0.4)
ERYTHROCYTE [DISTWIDTH] IN BLOOD BY AUTOMATED COUNT: 13.2 % (ref 11.5–14.5)
ERYTHROCYTE [DISTWIDTH] IN BLOOD BY AUTOMATED COUNT: 50.9 FL (ref 35–45)
GFR SERPL CREATININE-BSD FRML MDRD: 43 ML/MIN/1.73M2
GLUCOSE BLD-MCNC: 93 MG/DL (ref 70–108)
HCT VFR BLD CALC: 40 % (ref 42–52)
HEMOGLOBIN: 13.5 GM/DL (ref 14–18)
IMMATURE GRANS (ABS): 0.01 THOU/MM3 (ref 0–0.07)
IMMATURE GRANULOCYTES: 0.2 %
LYMPHOCYTES # BLD: 30.8 %
LYMPHOCYTES ABSOLUTE: 1.8 THOU/MM3 (ref 1–4.8)
MCH RBC QN AUTO: 35.5 PG (ref 26–33)
MCHC RBC AUTO-ENTMCNC: 33.8 GM/DL (ref 32.2–35.5)
MCV RBC AUTO: 105.3 FL (ref 80–94)
MONOCYTES # BLD: 8.3 %
MONOCYTES ABSOLUTE: 0.5 THOU/MM3 (ref 0.4–1.3)
NUCLEATED RED BLOOD CELLS: 0 /100 WBC
PLATELET # BLD: 191 THOU/MM3 (ref 130–400)
PMV BLD AUTO: 9.5 FL (ref 9.4–12.4)
POTASSIUM SERPL-SCNC: 3.6 MEQ/L (ref 3.5–5.2)
RBC # BLD: 3.8 MILL/MM3 (ref 4.7–6.1)
SEG NEUTROPHILS: 55.8 %
SEGMENTED NEUTROPHILS ABSOLUTE COUNT: 3.3 THOU/MM3 (ref 1.8–7.7)
SODIUM BLD-SCNC: 141 MEQ/L (ref 135–145)
WBC # BLD: 6 THOU/MM3 (ref 4.8–10.8)

## 2021-11-12 PROCEDURE — 6370000000 HC RX 637 (ALT 250 FOR IP): Performed by: STUDENT IN AN ORGANIZED HEALTH CARE EDUCATION/TRAINING PROGRAM

## 2021-11-12 PROCEDURE — 80048 BASIC METABOLIC PNL TOTAL CA: CPT

## 2021-11-12 PROCEDURE — 97530 THERAPEUTIC ACTIVITIES: CPT

## 2021-11-12 PROCEDURE — 36415 COLL VENOUS BLD VENIPUNCTURE: CPT

## 2021-11-12 PROCEDURE — 6360000002 HC RX W HCPCS: Performed by: PEDIATRICS

## 2021-11-12 PROCEDURE — 2580000003 HC RX 258: Performed by: PEDIATRICS

## 2021-11-12 PROCEDURE — 51798 US URINE CAPACITY MEASURE: CPT

## 2021-11-12 PROCEDURE — 96366 THER/PROPH/DIAG IV INF ADDON: CPT

## 2021-11-12 PROCEDURE — 2580000003 HC RX 258

## 2021-11-12 PROCEDURE — 97166 OT EVAL MOD COMPLEX 45 MIN: CPT

## 2021-11-12 PROCEDURE — 6360000002 HC RX W HCPCS: Performed by: INTERNAL MEDICINE

## 2021-11-12 PROCEDURE — 6370000000 HC RX 637 (ALT 250 FOR IP): Performed by: PEDIATRICS

## 2021-11-12 PROCEDURE — 6370000000 HC RX 637 (ALT 250 FOR IP): Performed by: INTERNAL MEDICINE

## 2021-11-12 PROCEDURE — 99233 SBSQ HOSP IP/OBS HIGH 50: CPT | Performed by: INTERNAL MEDICINE

## 2021-11-12 PROCEDURE — 96361 HYDRATE IV INFUSION ADD-ON: CPT

## 2021-11-12 PROCEDURE — 97162 PT EVAL MOD COMPLEX 30 MIN: CPT

## 2021-11-12 PROCEDURE — 2580000003 HC RX 258: Performed by: INTERNAL MEDICINE

## 2021-11-12 PROCEDURE — 85025 COMPLETE CBC W/AUTO DIFF WBC: CPT

## 2021-11-12 PROCEDURE — 96372 THER/PROPH/DIAG INJ SC/IM: CPT

## 2021-11-12 PROCEDURE — 2060000000 HC ICU INTERMEDIATE R&B

## 2021-11-12 PROCEDURE — 6360000002 HC RX W HCPCS

## 2021-11-12 RX ORDER — ZIPRASIDONE MESYLATE 20 MG/ML
10 INJECTION, POWDER, LYOPHILIZED, FOR SOLUTION INTRAMUSCULAR ONCE
Status: COMPLETED | OUTPATIENT
Start: 2021-11-13 | End: 2021-11-12

## 2021-11-12 RX ORDER — LANOLIN ALCOHOL/MO/W.PET/CERES
3 CREAM (GRAM) TOPICAL NIGHTLY PRN
Status: DISCONTINUED | OUTPATIENT
Start: 2021-11-12 | End: 2021-11-13 | Stop reason: SDUPTHER

## 2021-11-12 RX ORDER — QUETIAPINE FUMARATE 25 MG/1
25 TABLET, FILM COATED ORAL ONCE
Status: DISCONTINUED | OUTPATIENT
Start: 2021-11-12 | End: 2021-11-12

## 2021-11-12 RX ORDER — ZIPRASIDONE MESYLATE 20 MG/ML
INJECTION, POWDER, LYOPHILIZED, FOR SOLUTION INTRAMUSCULAR
Status: COMPLETED
Start: 2021-11-12 | End: 2021-11-12

## 2021-11-12 RX ADMIN — ZIPRASIDONE MESYLATE 10 MG: 20 INJECTION, POWDER, LYOPHILIZED, FOR SOLUTION INTRAMUSCULAR at 23:54

## 2021-11-12 RX ADMIN — Medication 1.2 ML: at 23:54

## 2021-11-12 RX ADMIN — MICONAZOLE NITRATE: 20 POWDER TOPICAL at 19:35

## 2021-11-12 RX ADMIN — SODIUM CHLORIDE, PRESERVATIVE FREE 10 ML: 5 INJECTION INTRAVENOUS at 19:35

## 2021-11-12 RX ADMIN — MEROPENEM 1000 MG: 1 INJECTION, POWDER, FOR SOLUTION INTRAVENOUS at 10:23

## 2021-11-12 RX ADMIN — SODIUM CHLORIDE: 9 INJECTION, SOLUTION INTRAVENOUS at 10:16

## 2021-11-12 RX ADMIN — ENOXAPARIN SODIUM 30 MG: 30 INJECTION SUBCUTANEOUS at 12:16

## 2021-11-12 RX ADMIN — AMLODIPINE BESYLATE 10 MG: 10 TABLET ORAL at 07:51

## 2021-11-12 RX ADMIN — Medication 3 MG: at 21:51

## 2021-11-12 RX ADMIN — LEVOTHYROXINE SODIUM 50 MCG: 0.05 TABLET ORAL at 06:14

## 2021-11-12 RX ADMIN — MICONAZOLE NITRATE: 20 POWDER TOPICAL at 07:51

## 2021-11-12 RX ADMIN — ASPIRIN 81 MG CHEWABLE TABLET 81 MG: 81 TABLET CHEWABLE at 07:51

## 2021-11-12 RX ADMIN — WATER 1.2 ML: 1 INJECTION INTRAMUSCULAR; INTRAVENOUS; SUBCUTANEOUS at 23:54

## 2021-11-12 ASSESSMENT — PAIN SCALES - GENERAL
PAINLEVEL_OUTOF10: 0
PAINLEVEL_OUTOF10: 0

## 2021-11-12 NOTE — PROGRESS NOTES
Palliative care eval received \"per protocol\" to discuss code status and goals of care. Current code status is Full code, pt has LW and DPOA on file. Pt's son,Tarik is listed as DPOA and Edward Cid is alternate. Dr Melina Barber is on unit, case discussed with him. Writer in to see pt. Pt is sitting in bedside chair, awake and alert, pleasantly confused. Pt states that he's feeling \"pretty good\". Telesitter is at bedside. Pt's son,Tarik is here. Palliative care introduced, pt condition reviewed, baseline health reviewed. We discussed the cumulative effects of chronic UTI on pt, discussed that at some point, pt is not safe to stay by himself. Willem Davies states agreement, states that he is going to increase surveillance of pt to assure safety. Code status options explained, along with what is entailed with each level. Discussed possible complications of rib fractures, brain and organ damage associated with resuscitative measures. Intubation and MIV for resp failure also explained along with what is entailed with that. All questions answered and Willem Davies states understanding. Willem Davies requests code status changed to reflect no intubation for resp failure and no resuscitation for cardiac arrest.  Willem Davies states that other treatments that pt may need will be decided as the need arises. [facility] DNR-CCA and script with \"no intubation\" completed and signed by Dr Melina Barber and Willem Davies. Copy placed on chart, original given to Willem Davies along with copies to have on hand at home in the event an ambulance is needed. Code status changed in Epic to Limited, no x4. Copy of forms faxed to medical records to be filed at this facility. Pt's nurse,ANNIE Green updated. Palliative care will continue to follow as needed, floor to notify PRN for urgent needs.

## 2021-11-12 NOTE — CARE COORDINATION
DISCHARGE/PLANNING EVALUATION  11/12/21, 8:58 AM EST    Reason for Referral: Discharge planning  Mental Status: Alert to person only  Decision Making: Self  Family/Social/Home Environment: Patient lives alone in his home. Patient has a son close by that is supportive and assists as needed. Current Services including food security, transportation and housekeeping: Patient was independent prior to admission and was able to cook, clean and drive short distances. Current Equipment: None  Payment Source: [employer]  Concerns or Barriers to Discharge: None  Post acute provider list with quality measures, geographic area and applicable managed care information provided. Questions regarding selection process answered: Offered    Teach Back Method used with patient and son Meghna Serrano [facility]) regarding care plan and needs. Patient and Meghna Serrano verbalize understanding of the plan of care and contribute to goal setting. Patient goals, treatment preferences and discharge plan: SW spoke with son Meghna Serrano due to patient being alert to person only. Meghna Serrano reported that he goes and checks on patient frequently throughout the day. He reported that he is independent at home. He reported that he is incontinent and gets confused when he gets UTIs. He reported that he would be open to [facility] at discharge but patient will refuse to go to an ECF. KATHLEEN called and made referral to [facility].     Electronically signed by PAMELLA Storm on 11/12/2021 at 8:58 AM

## 2021-11-12 NOTE — PROGRESS NOTES
Patient moved from [phone] to [phone] due to being very confused, telesitter going off as patient out of bed. For patient safety moved to 4K15. Telesitter called and updated. Family also was aware of the move for patient safety and agreeable.

## 2021-11-12 NOTE — FLOWSHEET NOTE
11/12/21 1736   Provider Notification   Reason for Communication Review case; Evaluate   Provider Name Dr Sullivan Members   Provider Notification Physician   Method of Communication Face to face   Response See orders   Notification Time [phone]   patient cough up dinner, having whole pieces.  New order for speech eval.

## 2021-11-12 NOTE — PROGRESS NOTES
Pt admitted to  [facility] from ED and via cart/stretcher. Complaints: confusion. IV normal saline infusing into the forearm left, condition patent and no redness at a rate of 75 mls/ hour with about 900 mls in the bag still. IV site free of s/s of infection or infiltration. Vital signs obtained. Assessment and data collection initiated. Two nurse skin assessment performed by Francoise Chatterjee RN and Karoline Juarez RN. Oriented to room. Policies and procedures for  explained. All questions answered with no further questions at this time. Fall prevention and safety brochure discussed with patient. Bed alarm on. Call light in reach. Skin integrity issues as follows:   1. Buttocks red, blanchable  2. Bilateral heels red, blanchable  3. Scattered abrasions and bruising  4.  Groin red and excoriated

## 2021-11-12 NOTE — PROGRESS NOTES
Chun Perera 60  INPATIENT OCCUPATIONAL THERAPY  [facility] ICU STEPDOWN TELEMETRY 4K  EVALUATION    Time:   Time In: 3177  Time Out: 1150  Timed Code Treatment Minutes: 17 Minutes  Minutes: 27          Date: 2021  Patient Name: Alix Hodgkins,   Gender: male      MRN: [id number]  : 1926  (89 y.o.)  Referring Practitioner: Dustin Kee. Liv Skelton MD  Diagnosis: chronic UTI  Additional Pertinent Hx: per chart review; Alix Hodgkins is a 80 y.o. male with PMHx of prostate cancer status post TURP with history of UTIs, hypertension, hyperlipidemia, hypothyroidism, nephrolithiasis, and osteoarthritis who was brought into the emergency room here at [facility] by EMS on 11/10/2021 with complaints of about a 2-day history of altered mentation increased confusion decreased interaction and inability to care for himself like he normally did. At the time of evaluation and interview in the ER patient disoriented to time and unable to provide much information about why he is in the emergency room and what happened at home. He states that he is just unable to get it altogether for some reason. Per emergency staff notes, patient was brought in because his son realized that patient was not acting himself with increased confusion, decreased interaction and decreased energy, staying in the bathtub for 3 hours with no water despite being instructed by the son to call after he was done showering. Son also is unsure about whether patient has been taking his medications at home lately and whether he has been taking them correctly. It appears that patient has been living alone but failing to care for herself and needs higher level of care at this time. Denies any nausea vomiting abdominal pain or diarrhea. No fevers or chills or sore throat or shortness of breath.   No chest pain    Restrictions/Precautions:  Restrictions/Precautions: General Precautions, Fall Risk  Position Activity Restriction  Other position/activity restrictions: increased confusion/AMS    Subjective  Chart Reviewed: Yes, Orders, Progress Notes, History and Physical  Patient assessed for rehabilitation services?: Yes  Response to previous treatment: Patient with no complaints from previous session  Family / Caregiver Present: Yes (son)    Subjective: RN approved session. patient side lying in bed and agreeable to eval. delayed response to questions. A & O x 1. telesitter present. Pain:  Pain Assessment  Patient Currently in Pain: Denies    Vitals: Vitals not assessed per clinical judgement, see nursing flowsheet    Social/Functional History:  Lives With: Alone  Type of Home: House  Home Layout: Two level, Bed/Bath upstairs  Home Access: Stairs to enter with rails  Entrance Stairs - Number of Steps: 2   Bathroom Shower/Tub: Tub/Shower unit  Bathroom Toilet: Standard  Bathroom Equipment: Grab bars in shower  Bathroom Accessibility: Accessible    Receives Help From: Family  ADL Assistance: Independent  Homemaking Assistance: Independent  Ambulation Assistance: Independent  Transfer Assistance: Independent    Active : Yes  Occupation: Retired  Additional Comments: pt is a poor historian, unabel to get accurate or much information from pt due to AMS at this time. Hx taken from chart. reports he uses no AD prior to admit. VISION:WFL    HEARING:  WFL    COGNITION: Decreased Recall, Decreased Insight, Impaired Memory, Decreased Safety Awareness, Impaired Attention, Difficulty Following Commands and Impulsive    RANGE OF MOTION:  Right Upper Extremity: WFL  Left Upper Extremity:  WFL    STRENGTH:  Right Upper Extremity: shldr 4/5 elbow flex 4/5 ext 4-/5  (F)  Left Upper Extremity:  shldr 4-/5 elbow flex 4/5 ext 4-/5  (F)    SENSATION:   WFL    ADL:   Lower Extremity Dressing: Stand By Assistance. to doff/don slipper socks seated EOB  Toilet Transfer: [employer].  with no AD and impulsiveness. BALANCE:  Sitting Balance:  Stand By Assistance. due to impulsiveness  Standing Balance: Contact Guard Assistance. with no AD    BED MOBILITY:  Supine to Sit: Modified Independent with use of bedrails    TRANSFERS:  Sit to Stand:  Supervision. with patient standing impulsively and cues for awareness of IV line  Patient stood suddenly from EOB and attempted to start to walk prior to this writer being ready for patient to stand due to getting IV line untangled and pole around bed to safely stand and was not aware he was attached to an IV. FUNCTIONAL MOBILITY:  Assistive Device: None  Assist Level:  Contact Guard Assistance. Distance: To and from bathroom  And from EOB to recliner with cues for slowing down and for awareness of IV     Exercise:  not completed this date    Activity Tolerance:  Patient tolerance of  treatment: fair. Due to cognition, high fall risk, de-conditioned. Assessment:  Assessment: patient is very impulsive and has decreased insight to his deficits impacting his ability to complete ADLs and functional transfers safely unsupervised at this time. patient would benefit from conitnued, skilled OT to increase activity tolerance and ease, (I) and safety during ADLs and functional transfers to return home and decrease caregiver burden, prevent falls and re-hospitalization. Performance deficits / Impairments: Decreased ADL status, Decreased cognition, Decreased endurance, Decreased strength  Prognosis: Good  REQUIRES OT FOLLOW UP: Yes  Decision Making: Medium Complexity    Treatment Initiated: Treatment and education initiated within context of evaluation. Evaluation time included review of current medical information, gathering information related to past medical, social and functional history, completion of standardized testing, formal and informal observation of tasks, assessment of data and development of plan of care and goals.   Treatment time included skilled education and facilitation of tasks to increase safety and independence with ADL's for improved functional independence and quality of life. Discharge Recommendations:  Continue to assess pending progress, Patient would benefit from continued therapy after discharge    Patient Education:  OT Education: OT Role, Transfer Training, Plan of Care, Precautions, ADL Adaptive Strategies  Patient Education: safety during functional transfers and awareness of enviornment  Barriers to Learning: cognition    Equipment Recommendations:  Equipment Needed: No    Plan:  Times per week: 3-5x  Times per day: Daily  Current Treatment Recommendations: Strengthening, Endurance Training, Neuromuscular Re-education, Patient/Caregiver Education & Training, Self-Care / ADL, Safety Education & Training. See long-term goal time frame for expected duration of plan of care. If no long-term goals established, a short length of stay is anticipated. Goals:  Patient goals : none stated  Short term goals  Time Frame for Short term goals: by discharge  Short term goal 1: patient will tolerate 7 min functional standing with two hand release with MOD I and 1-2 verbal cues for safety to increase activity tolerance for ADL routine. Short term goal 2: patient will complete functional mobility house hold distances with (S) with 1-2 cues for safety and awareness of surroundings to increase ease with safely transitioning home. Short term goal 3: patient will tolerate moderate resistive UB exer to increase UB strength for self care routine. Short term goal 4: patient will complete ADL routine with MOD I with distant (S) and 1-2 verbal cues for safety and sequencing. Following session, patient left in safe position with all fall risk precautions in place.

## 2021-11-12 NOTE — PROGRESS NOTES
Hospitalist Progress Note       Patient:  Gaurang Soni      Unit/Bed:4K-15/015-A    YOB: 1926    MRN: [id number]       Acct: [de-identified]     PCP: OLGA Angulo CNP    Date of Admission: 11/10/2021    Assessment/Plan:     #Chronic UTI: Likely colonized bacteria. Multiple previous cultures show E faecalis, Strep agalactiae, Staph aureus, and Staph epi. Received ceftriaxone on admission. Switched to meropenem for possible ESBL and follow cultures for sensitivities. - D/c Abx as it does not appear to be a UTI at this time      #Hypothyroidism, uncontrolled: non-adherence to medication. TSH=126.8, FreeT4=0.56. Per patients daughter the patient has had iatrogenic destruction of the thyroid, but uncertain of exact details. This was also unconfirmed via chart review. 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 GFR=29  - Avoid nephrotoxic agents and renally dose medications  - Daily standing weights, strict I/O  - Daily BMP      #HTN : Controlled. - Continue amlodipine with holding parameters     #Hx of prostate cancer: s/p radiation in 2000.     #Hx of bladder stones: Noted. Follows with Dr. Lissette Maxwell (urology) outpatient.     #Code status: Patient's daughter and son are POA. A long conversation with the son was had along with palliative and the decision has been made to change pt to Limited No x4. Expected discharge date:  TBD    Disposition: Unknown at this time        [] Home       [] TCU       [] Rehab       [] Psych       [] SNF       [] [place]       [] Other-    Chief Complaint: UTI?     Hospital Course:     Gaurang Soni is a 80 y.o. male with PMHx of Prostate cancer s/p TURP, UTIs on Cipro daily, HTN, HLD, hypothyroidism, nephrolithiasis, and OA who presented to [facility] ED by EMS on 11/10/2021 with complaints of 2-day history of altered mentation, increased confusion, decreased interaction, and inability to care for himself like he normally did. Per ED staff notes, pt was brought in because his son realized that patient was not acting himself with increased confusion, decreased interaction and decreased energy, staying in the bathtub for 3 hours with no water despite being instructed by the son to call after he was done showering. Pt's son also is unsure about whether pt has been taking his medications at home lately and whether he has been taking them correctly. On the night of admission, pt's son went to his home after pt failed to answer his phone, pt was found disoriented in bathroom w/no obvious signs of injury. Denied any CP, SOB, N/V, abdominal pain, fever/chills.      ED Summary: Afebrile 98.7, tachycardic 99, RR 16-18, /98, with 90% O2 on RA. Cr 2.1 from a baseline of 1.6, lactic acid of 1.9, pro-Red of 0.1, troponin of 0.057, and a significantly increased TSH of 126.8 with a low free T4 of 0.56. CBC wnl. UA was strongly positive for UTI plus hyaline casts. CXR showed a LION and lingular lobe PNA or infiltrate. EKG with sinus rhythm and first-degree AV block but no acute ST segment elevation or depression. Pt received ceftriaxone 1g IV x1 and 2.5 L IV fluid boluses. Subjective (past 24 hours):      Pt A/Ox1, but easily re-oriented and pleasantly confused. Seems to be a slow progression of his baseline dementia. Pt denies any complaints at this time except for his confusion.      Medications:  Reviewed    Infusion Medications    sodium chloride      sodium chloride 75 mL/hr at 11/11/21 6220     Scheduled Medications    amLODIPine  10 mg Oral Daily    levothyroxine  50 mcg Oral Daily    sodium chloride flush  10 mL IntraVENous 2 times per day    enoxaparin  30 mg SubCUTAneous Daily    aspirin  81 mg Oral Daily    meropenem  1,000 mg IntraVENous Q12H    miconazole   Topical BID     PRN Meds: sodium chloride flush, sodium chloride, ondansetron **OR** ondansetron, polyethylene glycol, acetaminophen **OR** acetaminophen, aluminum & magnesium hydroxide-simethicone      Intake/Output Summary (Last 24 hours) at 11/12/2021 0759  Last data filed at 11/11/2021 2242  Gross per 24 hour   Intake 2818.71 ml   Output --   Net 2818.71 ml       Diet:  ADULT DIET; Regular    Exam:  BP (!) 168/80   Pulse 63   Temp 97.8 °F (36.6 °C) (Oral)   Resp 18   Ht 6' (1.829 m)   Wt 191 lb 9.6 oz (86.9 kg)   SpO2 96%   BMI 25.99 kg/m²     General appearance: No apparent distress, appears stated age and cooperative. HEENT: Pupils equal, round, and reactive to light. Conjunctivae/corneas clear. Neck: Supple, with full range of motion. No jugular venous distention. Trachea midline. Respiratory:  Normal respiratory effort. Clear to auscultation, bilaterally without Rales/Wheezes/Rhonchi. Cardiovascular: Regular rate and rhythm with normal S1/S2 without murmurs, rubs or gallops. Abdomen: Soft, non-tender, non-distended with normal bowel sounds. Musculoskeletal: passive and active ROM x 4 extremities. Skin: Skin color, texture, turgor normal.    Neurologic:  Neurovascularly intact without any focal sensory/motor deficits. Cranial nerves: II-XII intact, grossly non-focal.  Psychiatric: Alert and oriented, thought content appropriate  Capillary Refill: Brisk,< 3 seconds   Peripheral Pulses: +2 palpable, equal bilaterally       Labs:   Recent Labs     11/10/21  2235 11/12/21  0535   WBC 6.7 6.0   HGB 15.6 13.5*   HCT 45.0 40.0*    191     Recent Labs     11/10/21  2235 11/12/21  0535    141   K 4.5 3.6    109   CO2 20* 21*   BUN 33* 29*   CREATININE 2.1* 1.5*   CALCIUM 10.2 8.5     Recent Labs     11/10/21  2235   AST 34   ALT 14   BILITOT 0.9   ALKPHOS 89     No results for input(s): INR in the last 72 hours. No results for input(s): Adonica Hoose in the last 72 hours.   Recent Labs     11/10/21  2235   PROCAL 0.10*       Microbiology:      Urinalysis:      Lab Results   Component Value Date    NITRU NEGATIVE 11/10/2021    WBCUA OBSCURED 11/10/2021    BACTERIA FEW 11/10/2021    RBCUA OBSCURED 11/10/2021    BLOODU LARGE 11/10/2021    SPECGRAV >=1.030 01/13/2020    SPECGRAV 1.013 01/14/2017    GLUCOSEU NEGATIVE 11/10/2021       Radiology:  CT HEAD WO CONTRAST   Final Result   FINDINGS: Generalized moderate to severe volume loss. Chronic small vessel ischemic disease changes. No hemorrhage. No acute infarction. Ventricles are normal. Marked calcific atherosclerosis of the vertebral arteries. Calcific atherosclerosis of the    cavernous portions of the internal carotid arteries. Calvarium is intact. Visualized paranasal sinuses and mastoid air cells are clear. Deviation of the nasal septum. IMPRESSION:  No acute intracranial pathology            **This report has been created using voice recognition software. It may contain minor errors which are inherent in voice recognition technology. **      Final report electronically signed by Dr. Ramya Garcia on 11/11/2021 12:32 AM      XR CHEST PORTABLE   Final Result   Left lung infiltrates. **This report has been created using voice recognition software. It may contain minor errors which are inherent in voice recognition technology. **      Final report electronically signed by Dr. Ramya Garcia on 11/10/2021 11:51 PM          DVT prophylaxis: [x] Lovenox                                 [] SCDs                                 [] SQ Heparin                                 [] Encourage ambulation           [] Already on Anticoagulation     Code Status: Full Code    Tele:   [x] yes             [] no    Active Hospital Problems    Diagnosis Date Noted    Chronic UTI [N39.0] 11/11/2021       Electronically signed by Chalrey Mayorga MD on 11/12/2021 at 7:59 AM

## 2021-11-12 NOTE — PROGRESS NOTES
[address] [address]  INPATIENT PHYSICAL THERAPY  EVALUATION  [facility] ICU STEPDOWN TELEMETRY 4K - 4K-15/015-A    Time In: 6524  Time Out: 0806  Timed Code Treatment Minutes: 8 Minutes  Minutes: 16          Date: 2021  Patient Name: Fercho Charles,  Gender:  male        MRN: [id number]  : 1926  (89 y.o.)      Referring Practitioner: Sharron Roque MD  Diagnosis: elevated troponin level  Additional Pertinent Hx: Per chart Mame Vazquez is a 80 y.o. male with PMHx of prostate cancer status post TURP with history of UTIs, hypertension, hyperlipidemia, hypothyroidism, nephrolithiasis, and osteoarthritis who was brought into the emergency room here at [facility] by EMS on 11/10/2021 with complaints of about a 2-day history of altered mentation increased confusion decreased interaction and inability to care for himself like he normally did. At the time of evaluation and interview in the ER patient disoriented to time and unable to provide much information about why he is in the emergency room and what happened at home. He states that he is just unable to get it altogether for some reason. Per emergency staff notes, patient was brought in because his son realized that patient was not acting himself with increased confusion, decreased interaction and decreased energy, staying in the bathtub for 3 hours with no water despite being instructed by the son to call after he was done showering. Son also is unsure about whether patient has been taking his medications at home lately and whether he has been taking them correctly. It appears that patient has been living alone but failing to care for herself and needs higher level of care at this time. Denies any nausea vomiting abdominal pain or diarrhea. No fevers or chills or sore throat or shortness of breath. No chest pain. \"     Restrictions/Precautions:  Restrictions/Precautions: General Precautions, Fall Risk  Position Activity Restriction  Other position/activity restrictions: increased confusion/AMS    Subjective:  Chart Reviewed: Yes  Patient assessed for rehabilitation services?: Yes  Family / Caregiver Present: No  Subjective: OK to see pt per nursing, telesitter and nursing present. Pt willing to sit EOB and get into bedside chair during session initially, agreeable to PT session. General:  Overall Orientation Status: Impaired  Orientation Level: Unable to assess (AMS and confusion)  Follows Commands: Impaired    Vision: Within Functional Limits    Hearing: Within functional limits         Pain: denies     Vitals: Vitals not assessed per clinical judgement, see nursing flowsheet  Nurse checked vitals prior to session    Social/Functional History:    Lives With: Alone  Type of Home: House             ADL Assistance: Independent  Homemaking Assistance: Independent  Ambulation Assistance: Independent  Transfer Assistance: Independent    Active : Yes  Occupation: Retired  Additional Comments: pt is a poor historian, unabel to get accurate or much information from pt due to AMS at this time. Hx taken from chart    OBJECTIVE:  Range of Motion:  Bilateral Lower Extremity: WFL    Strength:  not able to accurately assess MMT due to resistance Pt able to lift LE off pillow in supine with mod A    Balance:  Static Sitting Balance:  Maximum Assistance, X 1, with cues for safety, with verbal cues   Pt able to get almost fully upright EOB and then increased resistance noted  Bed Mobility:  Rolling to Left: Maximum Assistance, X 1, with head of bed raised, with verbal cues , with increased time for completion   Supine to Sit: Maximum Assistance, X 1, with head of bed raised, with verbal cues , with increased time for completion  Scooting: Dependent with hercules sheet  Max cues required for completion, pt with increased resistance noted once almost sitting EOB and wanting to lay back in bed immediately following session.    Transfers:  Not Patient/Caregiver Education & Training, Positioning    Goals:  Patient goals : unable to state  Short term goals  Time Frame for Short term goals: by discharge  Short term goal 1: Pt will demo mod A for bed mobility tasks with modifications as needed to progress with mobility. Short term goal 2: Pt will toleratate 10 min sitting EOB to complete functional mobility to progress with transfers. Short term goal 3: Pt will complete rolling in bed L and R with mod A x1 for support to improve positioning. Short term goal 4: PT to assess transfers when pt appropriate. Long term goals  Time Frame for Long term goals : NA due to short ELOS    Following session, patient left in safe position with all fall risk precautions in place. Pt left in bed with all needs and call light in reach following session, bed alarm on.

## 2021-11-12 NOTE — PROGRESS NOTES
Collazo catheter attempted per order from Dr. Gracia Grissom. This RN unable to place collazo catheter due to resistance.

## 2021-11-12 NOTE — CARE COORDINATION
11/12/21, 7:39 AM EST  DISCHARGE PLANNING EVALUATION:    Saeed Sams       Admitted: 11/10/2021/ 2218   Hospital day: 1   Location: -15/015-A Reason for admit: Troponin level elevated [R77.8]  VADIM (acute kidney injury) ([facility] [address] [N17.9]  Sepsis secondary to UTI ([facility] [address] [A41.9, N39.0]  Altered mental status, unspecified altered mental status type [R41.82]  Urinary tract infection in male [N39.0]  Hypothyroidism, unspecified type [E03.9]   PMH:  has a past medical history of Cancer ([facility] [address] Hyperlipidemia, Hypertension, Kidney stone, and Thyroid disease. Procedure:   11/10 CXR: left lung infiltrates  11/11 CT head:   FINDINGS: Generalized moderate to severe volume loss. Chronic small vessel ischemic disease changes. No hemorrhage. No acute infarction. Ventricles are normal. Marked calcific atherosclerosis of the vertebral arteries. Calcific atherosclerosis of the    cavernous portions of the internal carotid arteries. Calvarium is intact. Visualized paranasal sinuses and mastoid air cells are clear. Deviation of the nasal septum. IMPRESSION:  No acute intracranial pathology           Barriers to Discharge:  Presented to ED for confusion and weakness. + UTI and pneumonia. Sepsis. Lactic 1.9. Cultures pending. Room air. IVF. IV merrem. + troponin. Creatinine 1.5. Daily wts. I/O. Telemetry. PT/OT. PCP: OLGA Dinh - CNP  Readmission Risk Score: 10.2 ( )%    Patient Goals/Plan/Treatment Preferences: Swati Gan is confused, SW did speak with son. He normally lives at home independently. Possible HH vs rehab/ecf. Following for needs. Transportation/Food Security/Housekeeping Addressed:  No issues identified.

## 2021-11-13 LAB
ANION GAP SERPL CALCULATED.3IONS-SCNC: 14 MEQ/L (ref 8–16)
BUN BLDV-MCNC: 16 MG/DL (ref 7–22)
CALCIUM SERPL-MCNC: 8.8 MG/DL (ref 8.5–10.5)
CHLORIDE BLD-SCNC: 109 MEQ/L (ref 98–111)
CO2: 19 MEQ/L (ref 23–33)
CREAT SERPL-MCNC: 1 MG/DL (ref 0.4–1.2)
FOLATE: 6.1 NG/ML (ref 4.8–24.2)
GFR SERPL CREATININE-BSD FRML MDRD: 69 ML/MIN/1.73M2
GLUCOSE BLD-MCNC: 101 MG/DL (ref 70–108)
MRSA SCREEN: NORMAL
POTASSIUM SERPL-SCNC: 3.3 MEQ/L (ref 3.5–5.2)
SODIUM BLD-SCNC: 142 MEQ/L (ref 135–145)
VITAMIN B-12: 219 PG/ML (ref 211–911)

## 2021-11-13 PROCEDURE — 92523 SPEECH SOUND LANG COMPREHEN: CPT

## 2021-11-13 PROCEDURE — 87086 URINE CULTURE/COLONY COUNT: CPT

## 2021-11-13 PROCEDURE — 2580000003 HC RX 258: Performed by: PEDIATRICS

## 2021-11-13 PROCEDURE — 36415 COLL VENOUS BLD VENIPUNCTURE: CPT

## 2021-11-13 PROCEDURE — 82607 VITAMIN B-12: CPT

## 2021-11-13 PROCEDURE — 99223 1ST HOSP IP/OBS HIGH 75: CPT | Performed by: INTERNAL MEDICINE

## 2021-11-13 PROCEDURE — 6370000000 HC RX 637 (ALT 250 FOR IP): Performed by: INTERNAL MEDICINE

## 2021-11-13 PROCEDURE — 96372 THER/PROPH/DIAG INJ SC/IM: CPT

## 2021-11-13 PROCEDURE — 92610 EVALUATE SWALLOWING FUNCTION: CPT

## 2021-11-13 PROCEDURE — 96361 HYDRATE IV INFUSION ADD-ON: CPT

## 2021-11-13 PROCEDURE — 82746 ASSAY OF FOLIC ACID SERUM: CPT

## 2021-11-13 PROCEDURE — 6370000000 HC RX 637 (ALT 250 FOR IP): Performed by: PEDIATRICS

## 2021-11-13 PROCEDURE — 6360000002 HC RX W HCPCS: Performed by: PEDIATRICS

## 2021-11-13 PROCEDURE — 6370000000 HC RX 637 (ALT 250 FOR IP): Performed by: STUDENT IN AN ORGANIZED HEALTH CARE EDUCATION/TRAINING PROGRAM

## 2021-11-13 PROCEDURE — 51798 US URINE CAPACITY MEASURE: CPT

## 2021-11-13 PROCEDURE — 2060000000 HC ICU INTERMEDIATE R&B

## 2021-11-13 PROCEDURE — 80048 BASIC METABOLIC PNL TOTAL CA: CPT

## 2021-11-13 RX ORDER — LANOLIN ALCOHOL/MO/W.PET/CERES
6 CREAM (GRAM) TOPICAL NIGHTLY PRN
Status: DISCONTINUED | OUTPATIENT
Start: 2021-11-13 | End: 2021-11-28 | Stop reason: HOSPADM

## 2021-11-13 RX ORDER — ZIPRASIDONE MESYLATE 20 MG/ML
10 INJECTION, POWDER, LYOPHILIZED, FOR SOLUTION INTRAMUSCULAR NIGHTLY PRN
Status: DISCONTINUED | OUTPATIENT
Start: 2021-11-13 | End: 2021-11-28 | Stop reason: HOSPADM

## 2021-11-13 RX ADMIN — SODIUM CHLORIDE, PRESERVATIVE FREE 10 ML: 5 INJECTION INTRAVENOUS at 19:54

## 2021-11-13 RX ADMIN — LEVOTHYROXINE SODIUM 50 MCG: 0.05 TABLET ORAL at 12:29

## 2021-11-13 RX ADMIN — Medication 6 MG: at 23:28

## 2021-11-13 RX ADMIN — SODIUM CHLORIDE: 9 INJECTION, SOLUTION INTRAVENOUS at 04:09

## 2021-11-13 RX ADMIN — AMLODIPINE BESYLATE 10 MG: 10 TABLET ORAL at 12:29

## 2021-11-13 RX ADMIN — ENOXAPARIN SODIUM 30 MG: 30 INJECTION SUBCUTANEOUS at 12:30

## 2021-11-13 ASSESSMENT — PAIN SCALES - GENERAL: PAINLEVEL_OUTOF10: 0

## 2021-11-13 NOTE — PROGRESS NOTES
2230 - patient increasingly agitated and not able to be redirected. Patient states he is at work and needs to go home and get his car to go to the gas station. Writer attempted to reorient patient, unsuccessful. Messaged Dr. Xenia Saldana for something to help with patient's agitation, see new orders. 2340 - patient continues climbing out of bed, live sitter placed at bedside. [phone] - patient verbally threatening nursing staff and attempting to hit them. Security called to bedside, patient unable to be reoriented and uncooperative at this time. 46 - Dr. Xenia Saldana updated on events, see new orders. 0400 - nursing staff attempted to get vitals from patient, patient still attempting to hit staff and threatening staff saying \"I'll kill you\". Unable to obtain vital signs at this time. Patient remains in bed, not attempting to get up at this time. 8540 - nurse in to give patient morning medications, patient told nurse to leave him alone. Live sitter still in room, will continue to monitor.

## 2021-11-13 NOTE — PROGRESS NOTES
radiation in 2000.     #Hx of bladder stones: Noted. Follows with Dr. Jesus Vega (urology) outpatient.     #Code status: Patient's daughter and son are POA. A long conversation with the son was had along with palliative and the decision has been made to change pt to Limited No x4. Expected discharge date:  TBD    Disposition: Unknown at this time        [] Home       [] TCU       [] Rehab       [] Psych       [] SNF       [] [place]       [] Other-    Chief Complaint: AMS    Hospital Course:     Leon Delaney is a 80 y.o. male with PMHx of Prostate cancer s/p TURP, UTIs on Cipro daily, HTN, HLD, hypothyroidism, nephrolithiasis, and OA who presented to [facility] ED by EMS on 11/10/2021 with complaints of 2-day history of altered mentation, increased confusion, decreased interaction, and inability to care for himself like he normally did. Per ED staff notes, pt was brought in because his son realized that patient was not acting himself with increased confusion, decreased interaction and decreased energy, staying in the bathtub for 3 hours with no water despite being instructed by the son to call after he was done showering. Pt's son also is unsure about whether pt has been taking his medications at home lately and whether he has been taking them correctly. On the night of admission, pt's son went to his home after pt failed to answer his phone, pt was found disoriented in bathroom w/no obvious signs of injury. Denied any CP, SOB, N/V, abdominal pain, fever/chills.      ED Summary: Afebrile 98.7, tachycardic 99, RR 16-18, /98, with 90% O2 on RA. Cr 2.1 from a baseline of 1.6, lactic acid of 1.9, pro-Red of 0.1, troponin of 0.057, and a significantly increased TSH of 126.8 with a low free T4 of 0.56. CBC wnl. UA was strongly positive for UTI plus hyaline casts. CXR showed a LION and lingular lobe PNA or infiltrate.  EKG with sinus rhythm and first-degree AV block but no acute ST segment elevation or depression. Pt received ceftriaxone 1g IV x1 and 2.5 L IV fluid boluses. 11/13: Pt seemed more confused and aggravated last night requiring Geodon. Following that pt went to sleep and had since been ok. Talked with pt all day today and was again, pleasantly confused. Very long discussion with the son Fortino Queen and the daughter was had in reference to pt disposition and progression. Pt likely needs 24/7 care and 1:1 supervision for medications and such. Will await the evaluation by psychology, but it appears pt's dementia is worsening. Hard to determine true extent as sever hypothyroidism can mimic dementia in many ways. Noted Macrocytosis on CBC and no evaluation of Vit B12 and Folate, so it has been ordered. Will f/u with CM/SW on Monday to discuss the full dispo in regard to pt safety in d/c. Pt seems to be experiencing what resembles sundowners or hospital delirium. Subjective (past 24 hours):      Pt A/Ox1, but easily re-oriented and pleasantly confused. Seems to be a slow progression of his baseline dementia. Pt denies any complaints at this time except for his confusion. Medications:  Reviewed    Infusion Medications    sodium chloride      sodium chloride 75 mL/hr at 11/13/21 0410     Scheduled Medications    amLODIPine  10 mg Oral Daily    levothyroxine  50 mcg Oral Daily    sodium chloride flush  10 mL IntraVENous 2 times per day    enoxaparin  30 mg SubCUTAneous Daily    [Held by provider] aspirin  81 mg Oral Daily    miconazole   Topical BID     PRN Meds: melatonin, sodium chloride flush, sodium chloride, ondansetron **OR** ondansetron, polyethylene glycol, acetaminophen **OR** acetaminophen, aluminum & magnesium hydroxide-simethicone      Intake/Output Summary (Last 24 hours) at 11/13/2021 0733  Last data filed at 11/13/2021 0435  Gross per 24 hour   Intake 3452.44 ml   Output --   Net 3452.44 ml       Diet:  ADULT DIET;  Regular    Exam:  BP (!) 158/72   Pulse 68   Temp 97.3 °F (36.3 °C) (Axillary)   Resp 18   Ht 6' (1.829 m)   Wt 185 lb (83.9 kg)   SpO2 100%   BMI 25.09 kg/m²     General appearance: No apparent distress, appears stated age and cooperative. HEENT: Pupils equal, round, and reactive to light. Conjunctivae/corneas clear. Neck: Supple, with full range of motion. No jugular venous distention. Trachea midline. Respiratory:  Normal respiratory effort. Clear to auscultation, bilaterally without Rales/Wheezes/Rhonchi. Cardiovascular: Regular rate and rhythm with normal S1/S2 without murmurs, rubs or gallops. Abdomen: Soft, non-tender, non-distended with normal bowel sounds. Musculoskeletal: passive and active ROM x 4 extremities. Skin: Skin color, texture, turgor normal.    Neurologic:  Neurovascularly intact without any focal sensory/motor deficits. Cranial nerves: II-XII intact, grossly non-focal.  Psychiatric: A/O x1, disorganized speech, tangential at times  Capillary Refill: Brisk,< 3 seconds   Peripheral Pulses: +2 palpable, equal bilaterally       Labs:   Recent Labs     11/10/21  2235 11/12/21  0535   WBC 6.7 6.0   HGB 15.6 13.5*   HCT 45.0 40.0*    191     Recent Labs     11/10/21  2235 11/12/21  0535    141   K 4.5 3.6    109   CO2 20* 21*   BUN 33* 29*   CREATININE 2.1* 1.5*   CALCIUM 10.2 8.5     Recent Labs     11/10/21  2235   AST 34   ALT 14   BILITOT 0.9   ALKPHOS 89     No results for input(s): INR in the last 72 hours. No results for input(s): Ld Jose in the last 72 hours.   Recent Labs     11/10/21  2235   PROCAL 0.10*       Microbiology:      Urinalysis:      Lab Results   Component Value Date    NITRU NEGATIVE 11/10/2021    WBCUA OBSCURED 11/10/2021    BACTERIA FEW 11/10/2021    RBCUA OBSCURED 11/10/2021    BLOODU LARGE 11/10/2021    SPECGRAV >=1.030 01/13/2020    SPECGRAV 1.013 01/14/2017    GLUCOSEU NEGATIVE 11/10/2021       Radiology:  CT HEAD WO CONTRAST   Final Result   FINDINGS: Generalized moderate to severe volume loss. Chronic small vessel ischemic disease changes. No hemorrhage. No acute infarction. Ventricles are normal. Marked calcific atherosclerosis of the vertebral arteries. Calcific atherosclerosis of the    cavernous portions of the internal carotid arteries. Calvarium is intact. Visualized paranasal sinuses and mastoid air cells are clear. Deviation of the nasal septum. IMPRESSION:  No acute intracranial pathology            **This report has been created using voice recognition software. It may contain minor errors which are inherent in voice recognition technology. **      Final report electronically signed by Dr. Claudia Diallo on 11/11/2021 12:32 AM      XR CHEST PORTABLE   Final Result   Left lung infiltrates. **This report has been created using voice recognition software. It may contain minor errors which are inherent in voice recognition technology. **      Final report electronically signed by Dr. Claudia Diallo on 11/10/2021 11:51 PM          DVT prophylaxis: [x] Lovenox                                 [] SCDs                                 [] SQ Heparin                                 [] Encourage ambulation           [] Already on Anticoagulation     Code Status: Limited    Tele:   [x] yes             [] no    Active Hospital Problems    Diagnosis Date Noted    Chronic UTI [N39.0] 11/11/2021       Electronically signed by Orson Dakin, MD on 11/13/2021 at 7:33 AM

## 2021-11-13 NOTE — PROGRESS NOTES
[facility] THERAPY  STR ICU STEPDOWN TELEMETRY 4K  Speech - Language - Cognitive Evaluation + Clinical Swallow Evaluation    SLP Individual Minutes  Time In: 1137  Time Out: 8978  Minutes: 38  Timed Code Treatment Minutes: 0 Minutes   Cognitive-linguistic evaluation: 26 minutes  Clinical swallow evaluation: 12 minutes    Date: 2021  Patient Name: Konrad Don      CSN: [id number]   : 1926  (89 y.o.)  Gender: male   Referring Physician: Maye Kunz MD  Diagnosis: Troponin level elevation  Secondary Diagnosis: Dysphagia, cognitive deficits  Precautions: Aspiration precautions  History of Present Illness/Injury: Patient admitted to [facility] with above diagnosis. See physician H&P for further details. Per chart review: Torres Santiago is a 80 y.o. male with PMHx of prostate cancer status post TURP with history of UTIs, hypertension, hyperlipidemia, hypothyroidism, nephrolithiasis, and osteoarthritis who was brought into the emergency room here at [facility] by EMS on 11/10/2021 with complaints of about a 2-day history of altered mentation increased confusion decreased interaction and inability to care for himself like he normally did. At the time of evaluation and interview in the ER patient disoriented to time and unable to provide much information about why he is in the emergency room and what happened at home. He states that he is just unable to get it altogether for some reason. Per emergency staff notes, patient was brought in because his son realized that patient was not acting himself with increased confusion, decreased interaction and decreased energy, staying in the bathtub for 3 hours with no water despite being instructed by the son to call after he was done showering. Son also is unsure about whether patient has been taking his medications at home lately and whether he has been taking them correctly.   It appears that patient has been living alone but failing to care for herself and needs higher level of care at this time. Denies any nausea vomiting abdominal pain or diarrhea. No fevers or chills or sore throat or shortness of breath. No chest pain. Chest x-ray showed a left upper and lingular lobe pneumonia or infiltrate. Twelve-lead EKG with sinus rhythm and first-degree AV block but no acute ST segment elevation or depression. Patient received ceftriaxone 1 g IV x1 and 2.5 L IV fluid boluses. Patient was then admitted to our hospitalist service for further evaluation and treatment. At the time of evaluation in the ER patient was laying in bed comfortably in no acute distress. Able to follow commands and answer simple questions and name his date of birth but disoriented and unable to provide any details about current or recent events. Novel orders received per Minerva Franco MD to complete clinical swallow evaluation and cognitive-linguistic evaluation this date.      Past Medical History:   Diagnosis Date    Cancer [facility]) 2000    Prostate    Hyperlipidemia     Hypertension     Kidney stone     Thyroid disease        Pain: No pain reported. Subjective:  RN Queen Xander confirmed patient appropriate for clinical swallow evaluation and cognitive-linguistic evaluation this date. Live sitter present upon arrival with patient. Jaimie Peace reports patient did not receive a breakfast tray or lunch tray this date. Patient pleasantly confused and cooperative. No family present. SOCIAL HISTORY:   Living Arrangements: Reports to live with wife in [facility]; however, H&P reports he lives alone  Work History: Retired  Education Level: 12th grade  Driving Status: Active   Finance Management: Assistance Required \"Son and neighbors help\"  Medication Management: Independent  ADL's: Independent.    Hobbies: N/A  Vision Status: Wears glasses, not present in room this date  Hearing: Impaired  Type of Home: House  Home Layout: Two level, Bed/Bath upstairs  Home Access: Stairs to enter with rails  Entrance Stairs - Number of Steps: 2    SPEECH / VOICE:  Speech and Voice appear to be grossly intact for basic and complex daily communication    LANGUAGE:  Receptive:  1 Step Commands: 2/2  2 Step Commands: 2/2  Simple Yes/No Questions: 2/2  Complex Yes/No Questions: 2/2  Receptive language skills appear to be grossly intact for basic and complex daily communication. Expressive:  Automatic Speech: 2/2  Sentence Completion (automatic): 2/2  Confrontational Namin/3  Responsive Namin/2  Divergent Naming (concrete): 2 words per minute (target 11)  Conversational Speech: WFL  Expressive language skills appear to be grossly intact for basic and complex daily communication. COGNITION:  [place] Cognitive Assessment (MOCA) version 7.2 completed. Pt scored . Normal is greater than or equal to /30.   Inclusion of +1 point given highest level of education achieved less than/equal to 12th grade or GED with limited-0 post-secondary schooling     Orientation:   Immediate Recall: Trial 1: 2/5, Trial 2: 4/5   Short-Term Recall: 0/5 indep, 2/5 category cue, 2/5 multiple choice cue  Divergent Namin words per minute (target 11)  Reasonin/  Attention:   Math Computation: 0/3  Executive Functionin/5    SWALLOWING:    Respiratory Status: Independent      Behavioral Observation: Confused    ORAL MECHANISM EVALUATION:      Facial / Labial WFL    Lingual WFL    Dentition WFL    Velum WFL    Vocal Quality WFL    Sensation Not Tested    Cough WFL      PATIENT WAS EVALUATED USING:  Thin liquids via cup/straw, puree, minced and moist, soft and bite size, regular solids    ORAL PHASE:  Impaired:  Reduced Bolus Formation    PHARYNGEAL PHASE:  Impaired:  Delayed Swallow, Decreased Hyolaryngeal Elevation and Audible Swallow    SIGNS AND SYMPTOMS OF LARYNGEAL PENETRATION / ASPIRATION:  Immediate Cough    INSTRUMENTAL EVALUATION: Modified Barium Swallow (MBS)    DIET RECOMMENDATIONS:  Regular diet with thin liquids (SMALL SIPS)    STRATEGIES: Strategies pending MBS completion          RECOMMENDATIONS/ASSESSMENT:  DIAGNOSTIC IMPRESSIONS:  Patient presents with a moderate cognitive impairment as evidenced by deficits within the domains of orientation, immediate/delayed recall, math computation, sentence repetition, higher level naming and executive functions. Patient tangential during evaluation and demonstrating confusion. Patient reports to be independent in completing ADLs and only requires minimal assistance with medications and finances. No family present during evaluation; therefore unsure of levels of support when in home environment. It is recommended patient receive skilled speech therapy addressing aforementioned cognitive deficits to promote safety in current environment. At this time it is also recommended patient receive DIRECT 24 hr supervision upon discharge home with DIRECT 1:1 assitance with medications and finances. In regards to swallowing, patient presents with a mild oral dysphagia and a suspected mild pharyngeal dysphagia as evidenced by above skilled level findings. Patient with reduced textural breakdown of regular solids resulting in mild lingual stasis. Stasis effectively cleared with additional swallow. Hyolaryngeal elevation was reduced upon tactile palpation. Audible swallow present on 8/8 trials of thin liquids this date rising concerns for potential presence of delayed swallow. Patient with immediate cough x1 via large drink of thin liquids by cup and x1 via large drink by straw. 6/8 trials of small sips of thin liquids via cup/straw were demonstrated without overt s/s of aspiration. Certainly cannot rule out pharyngeal phase dysfunctions without formal imaging.  Given findings of \"left upper and lingular lobe pneumonia or infiltrate\" it is recommended patient complete a MBS to further assess pharyngeal swallowing integrity to determine safest diet level recommendation. Until MBS, it is recommended patient consume a regular diet with thin liquids (SMALL SIPS, close pulmonary monitoring) to assist in nutrition/hydration measures without overt s/s of aspiration until completion of MBS. Rehabilitation Potential: fair    EDUCATION:  Learner: Patient  Education:  Reviewed results and recommendations of this evaluation, Reviewed diet and strategies, Reviewed ST goals and Plan of Care, Reviewed recommendations for follow-up, Education Related to Potential Risks and Complications Due to Impairment/Illness/Injury, Education Related to Prevention of Recurrence of Impairment/Illness/Injury, Education Related to [employer] and Wellness and Home Safety Education  Evaluation of Education: Demonstrates with assistance, Needs further instruction and Family not present    PLAN:  Skilled SLP intervention on acute care 3-5 x per week or until goals met and/or pt plateaus in function. Specific interventions for next session may include: MBS. PATIENT GOAL:    Did not state. Will further assess during treatment. SHORT TERM GOALS:  Short-term Goals  Timeframe for Short-term Goals: 2 weeks  Goal 1: Patient will consume a regular diet with thin liquids (SMALL SIPS) without overt s/s of aspriation while implementing identified compensatory strategies to assist in nutrition/hydration measures. Goal 2: ST f/u to determine potential need for MBS to further asess pharyngeal phase of swallowing to ensure safest diet level. Goal 3: Patient will complete basic orientation and STM of 3 units of information with 60% accuracy given mod cues to improve awareness and retention of information in immediate environment. Goal 4: Patient will complete basic problem solving and reasoning tasks with 60% accuracy given mod cues to improve safety within current and home environment.   Goal 5: Patient will complete thought organizaion and sequencing tasks with 60% accuracy given mod cues to improve mental flexibility and participation in ADL tasks. LONG TERM GOALS:  No LTGs at this date d/t ELOS.     Meagan Soni M.S., CF-SLP, [id number]

## 2021-11-14 PROBLEM — G30.1 LATE ONSET ALZHEIMER'S DEMENTIA WITH BEHAVIORAL DISTURBANCE (HCC): Status: ACTIVE | Noted: 2021-01-01

## 2021-11-14 PROBLEM — F02.818 LATE ONSET ALZHEIMER'S DEMENTIA WITH BEHAVIORAL DISTURBANCE (HCC): Status: ACTIVE | Noted: 2021-01-01

## 2021-11-14 PROBLEM — G93.41 METABOLIC ENCEPHALOPATHY: Status: ACTIVE | Noted: 2021-01-01

## 2021-11-14 LAB
ANION GAP SERPL CALCULATED.3IONS-SCNC: 12 MEQ/L (ref 8–16)
BUN BLDV-MCNC: 24 MG/DL (ref 7–22)
CALCIUM SERPL-MCNC: 9.2 MG/DL (ref 8.5–10.5)
CHLORIDE BLD-SCNC: 105 MEQ/L (ref 98–111)
CO2: 24 MEQ/L (ref 23–33)
CREAT SERPL-MCNC: 1.3 MG/DL (ref 0.4–1.2)
GFR SERPL CREATININE-BSD FRML MDRD: 51 ML/MIN/1.73M2
GLUCOSE BLD-MCNC: 100 MG/DL (ref 70–108)
POTASSIUM SERPL-SCNC: 3.6 MEQ/L (ref 3.5–5.2)
SODIUM BLD-SCNC: 141 MEQ/L (ref 135–145)

## 2021-11-14 PROCEDURE — 80048 BASIC METABOLIC PNL TOTAL CA: CPT

## 2021-11-14 PROCEDURE — 2580000003 HC RX 258: Performed by: PEDIATRICS

## 2021-11-14 PROCEDURE — 96361 HYDRATE IV INFUSION ADD-ON: CPT

## 2021-11-14 PROCEDURE — 6360000002 HC RX W HCPCS: Performed by: PEDIATRICS

## 2021-11-14 PROCEDURE — 99232 SBSQ HOSP IP/OBS MODERATE 35: CPT | Performed by: INTERNAL MEDICINE

## 2021-11-14 PROCEDURE — 6370000000 HC RX 637 (ALT 250 FOR IP): Performed by: STUDENT IN AN ORGANIZED HEALTH CARE EDUCATION/TRAINING PROGRAM

## 2021-11-14 PROCEDURE — 6370000000 HC RX 637 (ALT 250 FOR IP): Performed by: INTERNAL MEDICINE

## 2021-11-14 PROCEDURE — 36415 COLL VENOUS BLD VENIPUNCTURE: CPT

## 2021-11-14 PROCEDURE — 96372 THER/PROPH/DIAG INJ SC/IM: CPT

## 2021-11-14 PROCEDURE — 6370000000 HC RX 637 (ALT 250 FOR IP): Performed by: PEDIATRICS

## 2021-11-14 PROCEDURE — 2060000000 HC ICU INTERMEDIATE R&B

## 2021-11-14 RX ORDER — POTASSIUM CHLORIDE 7.45 MG/ML
10 INJECTION INTRAVENOUS PRN
Status: DISCONTINUED | OUTPATIENT
Start: 2021-11-14 | End: 2021-11-28 | Stop reason: HOSPADM

## 2021-11-14 RX ORDER — POTASSIUM CHLORIDE 20 MEQ/1
40 TABLET, EXTENDED RELEASE ORAL PRN
Status: DISCONTINUED | OUTPATIENT
Start: 2021-11-14 | End: 2021-11-28 | Stop reason: HOSPADM

## 2021-11-14 RX ADMIN — MICONAZOLE NITRATE: 20 POWDER TOPICAL at 08:19

## 2021-11-14 RX ADMIN — AMLODIPINE BESYLATE 10 MG: 10 TABLET ORAL at 08:19

## 2021-11-14 RX ADMIN — SODIUM CHLORIDE, PRESERVATIVE FREE 10 ML: 5 INJECTION INTRAVENOUS at 21:29

## 2021-11-14 RX ADMIN — LEVOTHYROXINE SODIUM 50 MCG: 0.05 TABLET ORAL at 06:17

## 2021-11-14 RX ADMIN — SODIUM CHLORIDE, PRESERVATIVE FREE 10 ML: 5 INJECTION INTRAVENOUS at 08:19

## 2021-11-14 RX ADMIN — ENOXAPARIN SODIUM 40 MG: 100 INJECTION SUBCUTANEOUS at 12:45

## 2021-11-14 RX ADMIN — MICONAZOLE NITRATE: 20 POWDER TOPICAL at 21:29

## 2021-11-14 RX ADMIN — Medication 6 MG: at 21:29

## 2021-11-14 ASSESSMENT — PAIN SCALES - GENERAL
PAINLEVEL_OUTOF10: 0

## 2021-11-14 NOTE — PROGRESS NOTES
Paged Dr. Mazin Soria Via perfect serve awaiting response stating, Patient potassium 3.3 this morning, never replaced. Patient has human sitter due to agitation, confusion can I put an order in for that. Also Patient does not have tele on due to agitation.  Patient is a limitedx4 with a human sitter do you want the tele on  Response: Place order for sitter, and have patient where telemetry

## 2021-11-14 NOTE — PROGRESS NOTES
Assessment and Plan:        1. Dementia- pleasantly confused this am; up in chair; thinks he is in Fi. Dr Coni Littlejohn discussed with family 11.13 re pt's not being safe at home ( at length); eg sitting in empty bathtub with heater on. 2. asxatic bacteriuria- literature indicates no rx. CC:  confusion  HPI:  Pt with dementia, hbp,  brought to ER with confusion; billed as pneumonia/uti, he has neither. He was noted to have very high TSH on admission, despite being on thyroid replacement, suggesting not taking meds. ROS (12 point review of systems completed. Pertinent positives noted.  Otherwise ROS is negative) :   PMH:  Per HPI  SHX:  Currently lives alone  FHX: Noncontributory    Allergies: See above    Medications:     sodium chloride        sterile water  1.2 mL IntraMUSCular Once    amLODIPine  10 mg Oral Daily    levothyroxine  50 mcg Oral Daily    sodium chloride flush  10 mL IntraVENous 2 times per day    enoxaparin  30 mg SubCUTAneous Daily    [Held by provider] aspirin  81 mg Oral Daily    miconazole   Topical BID       Vital Signs:   BP (!) 143/69   Pulse 67   Temp 96.1 °F (35.6 °C) (Oral)   Resp 16   Ht 6' (1.829 m)   Wt 185 lb (83.9 kg)   SpO2 96%   BMI 25.09 kg/m²      Intake/Output Summary (Last 24 hours) at 11/14/2021 1027  Last data filed at 11/14/2021 0416  Gross per 24 hour   Intake 360 ml   Output --   Net 360 ml        Physical Examination: General appearance - alert, well appearing, and in no distress  Mental status - not oriented to place, time  Neck - supple, no significant adenopathy, no JVD, or carotid bruits  Chest - clear to auscultation, no wheezes, rales or rhonchi, symmetric air entry  Heart - normal rate, regular rhythm, normal S1, S2, no murmurs, rubs, clicks or gallops  Abdomen - soft, nontender, nondistended, no masses or organomegaly  Neurological - See above    Musculoskeletal - no muscular tenderness noted  Extremities - no pedal edema noted  Skin - normal coloration and turgor, no rashes, no suspicious skin lesions noted    Data: (All radiographs, tracings, PFTs, and imaging are personally viewed and interpreted unless otherwise noted).     Bmp- improved      Electronically signed by Clemencia Leavitt MD on 11/14/2021 at 10:27 AM

## 2021-11-15 ENCOUNTER — APPOINTMENT (OUTPATIENT)
Dept: GENERAL RADIOLOGY | Age: 86
DRG: 640 | End: 2021-11-15
Payer: MEDICARE

## 2021-11-15 LAB
ORGANISM: ABNORMAL
URINE CULTURE, ROUTINE: ABNORMAL

## 2021-11-15 PROCEDURE — 92611 MOTION FLUOROSCOPY/SWALLOW: CPT

## 2021-11-15 PROCEDURE — 92526 ORAL FUNCTION THERAPY: CPT

## 2021-11-15 PROCEDURE — 97535 SELF CARE MNGMENT TRAINING: CPT

## 2021-11-15 PROCEDURE — 96372 THER/PROPH/DIAG INJ SC/IM: CPT

## 2021-11-15 PROCEDURE — 6360000002 HC RX W HCPCS: Performed by: PEDIATRICS

## 2021-11-15 PROCEDURE — 97530 THERAPEUTIC ACTIVITIES: CPT

## 2021-11-15 PROCEDURE — 1200000000 HC SEMI PRIVATE

## 2021-11-15 PROCEDURE — 6370000000 HC RX 637 (ALT 250 FOR IP): Performed by: STUDENT IN AN ORGANIZED HEALTH CARE EDUCATION/TRAINING PROGRAM

## 2021-11-15 PROCEDURE — 99232 SBSQ HOSP IP/OBS MODERATE 35: CPT | Performed by: INTERNAL MEDICINE

## 2021-11-15 PROCEDURE — 74230 X-RAY XM SWLNG FUNCJ C+: CPT

## 2021-11-15 PROCEDURE — 2580000003 HC RX 258: Performed by: PEDIATRICS

## 2021-11-15 PROCEDURE — 97129 THER IVNTJ 1ST 15 MIN: CPT

## 2021-11-15 PROCEDURE — 2500000003 HC RX 250 WO HCPCS: Performed by: INTERNAL MEDICINE

## 2021-11-15 PROCEDURE — 6370000000 HC RX 637 (ALT 250 FOR IP): Performed by: PEDIATRICS

## 2021-11-15 RX ADMIN — BARIUM SULFATE 20 ML: 400 PASTE ORAL at 10:04

## 2021-11-15 RX ADMIN — ENOXAPARIN SODIUM 40 MG: 100 INJECTION SUBCUTANEOUS at 11:52

## 2021-11-15 RX ADMIN — SODIUM CHLORIDE, PRESERVATIVE FREE 10 ML: 5 INJECTION INTRAVENOUS at 20:32

## 2021-11-15 RX ADMIN — SODIUM CHLORIDE, PRESERVATIVE FREE 10 ML: 5 INJECTION INTRAVENOUS at 08:38

## 2021-11-15 RX ADMIN — BARIUM SULFATE 100 ML: 0.81 POWDER, FOR SUSPENSION ORAL at 10:04

## 2021-11-15 RX ADMIN — MICONAZOLE NITRATE: 20 POWDER TOPICAL at 20:32

## 2021-11-15 RX ADMIN — BARIUM SULFATE 60 ML: 400 SUSPENSION ORAL at 10:05

## 2021-11-15 RX ADMIN — LEVOTHYROXINE SODIUM 50 MCG: 0.05 TABLET ORAL at 06:43

## 2021-11-15 RX ADMIN — AMLODIPINE BESYLATE 10 MG: 10 TABLET ORAL at 08:38

## 2021-11-15 RX ADMIN — BARIUM SULFATE 50 ML: 400 SUSPENSION ORAL at 10:04

## 2021-11-15 RX ADMIN — MICONAZOLE NITRATE: 20 POWDER TOPICAL at 08:36

## 2021-11-15 ASSESSMENT — PAIN SCALES - GENERAL
PAINLEVEL_OUTOF10: 0

## 2021-11-15 NOTE — PROGRESS NOTES
Hospitalist Progress Note       Patient:  Fercho Bath      Unit/Bed:4K-15/015-A    YOB: 1926    MRN: [id number]       Acct: [de-identified]     PCP: OLGA Jernigan CNP    Date of Admission: 11/10/2021    Assessment/Plan:     #AMS likely 2/2 dehydration in setting of progressing Dementia: Could this have been a metabolic encephalopathy from a multitude of etiologies w/hypoxia as the dominating factor? More information from the son Chey Wolf appears to show that pt was found in bath tub, with no water left in it, a stand alone heater was blaring and had the temperature of the bathroom to a staggering degree. Appears to be a multifactorial combination of worsening dementia, uncontrolled hypothyroidism and dehydration leading to presentation on admission. MOCA assessment, pt scored 11/30. Severe hypothyroidism can mimic dementia and the presenting symptoms as well. - Psych evaluation pending for complete assessment of pt's overall clinical status   - Vitamin B12 & Folate ordered to evaluate for other causes - pending results     #Asymptomatic bacteruria in setting of Hx of Chronic UTI/Incontinence: Likely colonized bacteria. Multiple previous cultures show E faecalis, Strep agalactiae, Staph aureus, and Staph epi. Received ceftriaxone on admission. Switched to meropenem for possible ESBL and follow cultures for sensitivities. - D/c Abx as it does not appear to be a UTI at this time      #Hypothyroidism, uncontrolled: non-adherence to medication. .8, Free T4 0.56. Per pts daughter the patient has had iatrogenic destruction of the thyroid, but uncertain of exact details. This was also unconfirmed via chart review. 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 GFR 29  - Avoid nephrotoxic agents and renally dose medications  - Daily standing weights, strict I/O  - Daily BMP      #HTN : Controlled.   - Continue amlodipine with holding parameters     #Hx of prostate cancer: s/p radiation in 2000.     #Hx of bladder stones: Noted. Follows with Dr. Sandra Saul (urology) outpatient.     #Code status: Patient's daughter and son are POA. A long conversation with the son was had along with palliative and the decision has been made to change pt to Limited No x4. Expected discharge date:  TBD    Disposition: Unknown at this time        [] Home       [] TCU       [] Rehab       [] Psych       [] SNF       [] [place]       [] Other-    Chief Complaint: AMS    Hospital Course:     Saeed Sams is a 80 y.o. male with PMHx of Prostate cancer s/p TURP, UTIs on Cipro daily, HTN, HLD, hypothyroidism, nephrolithiasis, and OA who presented to [facility] ED by EMS on 11/10/2021 with complaints of 2-day history of altered mentation, increased confusion, decreased interaction, and inability to care for himself like he normally did. Per ED staff notes, pt was brought in because his son realized that patient was not acting himself with increased confusion, decreased interaction and decreased energy, staying in the bathtub for 3 hours with no water despite being instructed by the son to call after he was done showering. Pt's son also is unsure about whether pt has been taking his medications at home lately and whether he has been taking them correctly. On the night of admission, pt's son went to his home after pt failed to answer his phone, pt was found disoriented in bathroom w/no obvious signs of injury. Denied any CP, SOB, N/V, abdominal pain, fever/chills.      ED Summary: Afebrile 98.7, tachycardic 99, RR 16-18, /98, with 90% O2 on RA. Cr 2.1 from a baseline of 1.6, lactic acid of 1.9, pro-Red of 0.1, troponin of 0.057, and a significantly increased TSH of 126.8 with a low free T4 of 0.56. CBC wnl. UA was strongly positive for UTI plus hyaline casts. CXR showed a LION and lingular lobe PNA or infiltrate.  EKG with sinus rhythm and first-degree AV block but no acute ST segment elevation or depression. Pt received ceftriaxone 1g IV x1 and 2.5 L IV fluid boluses. 11/15: Pt  and pleasant today. Very long discussion with the son Antonio Varela again in reference to pt disposition and progression. Pt needs 24/7 care and 1:1 supervision for medications and such. Will f/u with CM/SW to discuss the full dispo in regard to pt safety in d/c. Medically he is stable and can be transferred off stepdown for the interim while pre-cert to [facility] is awaiting. Subjective (past 24 hours):      Pt A/Ox1, but easily re-oriented and pleasantly confused, remembers who I am when I come to see him. Seems to be a slow progression of his baseline dementia. Pt denies any complaints at this time. Medications:  Reviewed    Infusion Medications    sodium chloride       Scheduled Medications    enoxaparin  40 mg SubCUTAneous Daily    sterile water  1.2 mL IntraMUSCular Once    amLODIPine  10 mg Oral Daily    levothyroxine  50 mcg Oral Daily    sodium chloride flush  10 mL IntraVENous 2 times per day    [Held by provider] aspirin  81 mg Oral Daily    miconazole   Topical BID     PRN Meds: potassium chloride **OR** potassium alternative oral replacement **OR** potassium chloride, melatonin, ziprasidone **AND** sterile water, sodium chloride flush, sodium chloride, ondansetron **OR** ondansetron, polyethylene glycol, acetaminophen **OR** acetaminophen, aluminum & magnesium hydroxide-simethicone      Intake/Output Summary (Last 24 hours) at 11/15/2021 0721  Last data filed at 11/15/2021 0421  Gross per 24 hour   Intake 640 ml   Output 0 ml   Net 640 ml       Diet:  ADULT DIET; Regular    Exam:  BP (!) 152/80   Pulse 63   Temp 97.4 °F (36.3 °C) (Oral)   Resp 18   Ht 6' (1.829 m)   Wt 189 lb 14.4 oz (86.1 kg)   SpO2 96%   BMI 25.76 kg/m²     General appearance: No apparent distress, appears stated age and cooperative.   HEENT: Pupils equal, round, and reactive to light. Conjunctivae/corneas clear. Neck: Supple, with full range of motion. No jugular venous distention. Trachea midline. Respiratory:  Normal respiratory effort. Clear to auscultation, bilaterally without Rales/Wheezes/Rhonchi. Cardiovascular: Regular rate and rhythm with normal S1/S2 without murmurs, rubs or gallops. Abdomen: Soft, non-tender, non-distended with normal bowel sounds. Musculoskeletal: passive and active ROM x 4 extremities. Skin: Skin color, texture, turgor normal.    Neurologic:  Neurovascularly intact without any focal sensory/motor deficits. Cranial nerves: II-XII intact, grossly non-focal.  Psychiatric: A/O x1, disorganized speech, tangential at times  Capillary Refill: Brisk,< 3 seconds   Peripheral Pulses: +2 palpable, equal bilaterally       Labs:   No results for input(s): WBC, HGB, HCT, PLT in the last 72 hours. Recent Labs     11/13/21  1216 11/14/21  0852    141   K 3.3* 3.6    105   CO2 19* 24   BUN 16 24*   CREATININE 1.0 1.3*   CALCIUM 8.8 9.2     No results for input(s): AST, ALT, BILIDIR, BILITOT, ALKPHOS in the last 72 hours. No results for input(s): INR in the last 72 hours. No results for input(s): Marcela Ill in the last 72 hours. No results for input(s): PROCAL in the last 72 hours. Microbiology:      Urinalysis:      Lab Results   Component Value Date    NITRU NEGATIVE 11/10/2021    WBCUA OBSCURED 11/10/2021    BACTERIA FEW 11/10/2021    RBCUA OBSCURED 11/10/2021    BLOODU LARGE 11/10/2021    SPECGRAV >=1.030 01/13/2020    SPECGRAV 1.013 01/14/2017    GLUCOSEU NEGATIVE 11/10/2021       Radiology:  CT HEAD WO CONTRAST   Final Result   FINDINGS: Generalized moderate to severe volume loss. Chronic small vessel ischemic disease changes. No hemorrhage. No acute infarction. Ventricles are normal. Marked calcific atherosclerosis of the vertebral arteries. Calcific atherosclerosis of the    cavernous portions of the internal carotid arteries.  Calvarium is intact. Visualized paranasal sinuses and mastoid air cells are clear. Deviation of the nasal septum. IMPRESSION:  No acute intracranial pathology            **This report has been created using voice recognition software. It may contain minor errors which are inherent in voice recognition technology. **      Final report electronically signed by Dr. Anay Silveira on 11/11/2021 12:32 AM      XR CHEST PORTABLE   Final Result   Left lung infiltrates. **This report has been created using voice recognition software. It may contain minor errors which are inherent in voice recognition technology. **      Final report electronically signed by Dr. Anay Silveira on 11/10/2021 11:51 PM          DVT prophylaxis: [x] Lovenox                                 [] SCDs                                 [] SQ Heparin                                 [] Encourage ambulation           [] Already on Anticoagulation     Code Status: Limited    Tele:   [x] yes             [] no    Active Hospital Problems    Diagnosis Date Noted    Late onset Alzheimer's dementia with behavioral disturbance ([facility] [address] [G30.1, F02.81] 46/33/9541    Metabolic encephalopathy [Q62.01] 11/14/2021    Asymptomatic bacteriuria [R82.71]        Electronically signed by Raiza Subramanian MD on 11/15/2021 at 7:21 AM

## 2021-11-15 NOTE — PROGRESS NOTES
[facility]  Occupational Therapy  Daily Note  Time:   Time In: 1016  Time Out: 1309  Timed Code Treatment Minutes: 40 Minutes  Minutes: 40          Date: 11/15/2021  Patient Name: Konrad Don,   Gender: male      Room: -15015-A  MRN: [id number]  : 1926  (89 y.o.)  Referring Practitioner: Omelia Leyden. Cass Stoner MD  Diagnosis: chronic UTI  Additional Pertinent Hx: per chart review; Konrad Don is a 80 y.o. male with PMHx of prostate cancer status post TURP with history of UTIs, hypertension, hyperlipidemia, hypothyroidism, nephrolithiasis, and osteoarthritis who was brought into the emergency room here at [facility] by EMS on 11/10/2021 with complaints of about a 2-day history of altered mentation increased confusion decreased interaction and inability to care for himself like he normally did. At the time of evaluation and interview in the ER patient disoriented to time and unable to provide much information about why he is in the emergency room and what happened at home. He states that he is just unable to get it altogether for some reason. Per emergency staff notes, patient was brought in because his son realized that patient was not acting himself with increased confusion, decreased interaction and decreased energy, staying in the bathtub for 3 hours with no water despite being instructed by the son to call after he was done showering. Son also is unsure about whether patient has been taking his medications at home lately and whether he has been taking them correctly. It appears that patient has been living alone but failing to care for herself and needs higher level of care at this time. Denies any nausea vomiting abdominal pain or diarrhea. No fevers or chills or sore throat or shortness of breath.   No chest pain    Restrictions/Precautions:  Restrictions/Precautions: General Precautions, Fall Risk  Position Activity Restriction  Other position/activity restrictions: increased confusion/AMS     SUBJECTIVE: Pt resting in bed with son present upon arrival, agreeable to OT session. PAIN: No c/o. Vitals: Vitals not assessed per clinical judgement, see nursing flowsheet    COGNITION: Slow Processing, Decreased Recall, Decreased Insight, Impaired Memory, Decreased Problem Solving, Decreased Safety Awareness, Impaired Attention, Difficulty Following Commands, Impulsive and pt preservates on tasks and requires max cues to reorient to situation and transistioning to next task. ADL:   Grooming: Stand By Assistance. Standing sink side shaving face with electric razor and for oral care. Pt often questions what he is suppossed to be doing and demo's poor iniation with tasks. Extended time to complete shaving task    BALANCE:  Sitting Balance:  Stand By Assistance. EOB >5 minutes to encourage ambulation to BR for ADLs. Standing Balance: Stand By Assistance, [employer]. >10 minutes within grooming tasks- pt demo's flexed forward posture and monroe not align self with countertop appropriately. BED MOBILITY:  Supine to Sit: Stand By Assistance, with verbal cues , with increased time for completion, pt required several cues to initiate. Scooting: Stand By Assistance EOB    TRANSFERS:  Sit to Stand:  Contact Guard Assistance. Stand to Sit: Contact Guard Assistance. Comment: Pt impulsive    FUNCTIONAL MOBILITY:  Assistive Device: None  Assist Level:  Contact Guard Assistance. Distance:   Completed functional mobility to/from BR and within pt room at hurried pace, no LOB noted with pt unsteady throughout. Pt declines RW- may benefit from AD to increase stability- seated rest break after trial of mobility, min fatigue noted. ASSESSMENT:     Activity Tolerance:  Patient tolerance of  treatment: fair.        Discharge Recommendations: Subacute/skilled nursing facility, Not safe to return home at this time  Equipment Recommendations: Equipment Needed: No  Plan: Times per week: 3-5x  Times per day: Daily  Current Treatment Recommendations: Strengthening, Endurance Training, Neuromuscular Re-education, Patient/Caregiver Education & Training, Self-Care / ADL, Safety Education & Training    Patient Education  Patient Education: ADL's, Family Education, Importance of Increasing Activity and Safety with transfers and mobilty. Goals  Short term goals  Time Frame for Short term goals: by discharge  Short term goal 1: patient will tolerate 7 min functional standing with two hand release with MOD I and 1-2 verbal cues for safety to increase activity tolerance for ADL routine. Short term goal 2: patient will complete functional mobility house hold distances with (S) with 1-2 cues for safety and awareness of surroundings to increase ease with safely transitioning home. Short term goal 3: patient will tolerate moderate resistive UB exer to increase UB strength for self care routine. Short term goal 4: patient will complete ADL routine with MOD I with distant (S) and 1-2 verbal cues for safety and sequencing. Following session, patient left in safe position with all fall risk precautions in place.

## 2021-11-15 NOTE — PROGRESS NOTES
[facility]  PHYSICAL THERAPY MISSED TREATMENT NOTE  [facility] ICU STEPDOWN TELEMETRY 4K    Date: 11/15/2021  Patient Name: Modesto Garcia        MRN: [id number]   : 1926  (89 y.o.)  Gender: male   Referring Practitioner: Elisa Byers MD  Diagnosis: elevated troponin level         REASON FOR MISSED TREATMENT:  Missed Treat.   Pt leaving unit for swallow test.

## 2021-11-15 NOTE — CARE COORDINATION
11/15/21, 1:55 PM EST  DISCHARGE PLANNING EVALUATION    SW spoke with patient and son Ta Jones about discharge planning. Patient and son reported that they are agreeable to referral being sent to [facility]. SW explained precert process. SW made referral to Selene Cerrato at [facility].

## 2021-11-15 NOTE — PROGRESS NOTES
[facility] [address]  PHYSICAL THERAPY MISSED TREATMENT NOTE  [facility] ICU STEPDOWN TELEMETRY 4K    Date: 11/15/2021  Patient Name: Konrad Don        MRN: [id number]   : 1926  (89 y.o.)  Gender: male   Referring Practitioner: Festus Castro MD  Diagnosis: elevated troponin level         REASON FOR MISSED TREATMENT:  Missed Treat. On second attempt, pt with OT.

## 2021-11-15 NOTE — PROGRESS NOTES
[facility] ICU STEPDOWN TELEMETRY 4K  DAILY NOTE    TIME   SLP Individual Minutes  Time In: 3467  Time Out: 4689  Minutes: 15  Timed Code Treatment Minutes: 8 Minutes       Date: 11/15/2021  Patient Name: Taisha Stubbs      CSN: [id number]   : 1926  (89 y.o.)  Gender: male   Referring Physician:  Karin Dyson MD  Diagnosis: Troponin level elevation  Secondary Diagnosis: Dysphagia, cognitive deficits  Precautions: fall risk, aspiration  Current Diet: Regular/thin  Swallowing Strategies: Standard Universal Swallow Precautions  Date of Last MBS/FEES: Not Applicable    Pain:  No pain reported. Subjective:  Patient sitting upright in bed upon ST arrival. Patient pleasant and cooperative with ST session. Short-Term Goals:  SHORT TERM GOAL #1:  Goal 1: Patient will consume a regular diet with thin liquids (SMALL SIPS) without overt s/s of aspriation while implementing identified compensatory strategies to assist in nutrition/hydration measures. INTERVENTIONS: Skilled dietary analysis completed utilizing regular hard solid via cracker with peanut butter on top and thin liquid via cup. Patient with NO recall of compensatory swallow strategies introduced at last session. Hard solids revealed prolonged mastication, with mild lingual residue present (cleared with prompted liquid wash), incoordinated bolus formation/coordination with suspected delayed AP transit and mildly delayed swallow initiation. Patient presents with throat clear x3 following hard solid trials. PO intake of thin liquid revealed adequate oral acceptance, suspected delayed AP transit and swallow initiation. Patient presents with immediate throat clear following liquids and delayed cough. Audible swallow present across ALL PO trials.  Due to ST being unable to assess pharyngeal integrity at bedside and patient's decreased level of cognition, it is recommended patient complete MBS to determine cooperative within ST session     Assessment/Plan: Patient progressing toward established goals. Continues to require skilled care of licensed speech pathologist to progress toward achievement of established goals and plan of care. .     Plan for Next Session: [address] [place] [address], [address] [place] [address]

## 2021-11-15 NOTE — PROGRESS NOTES
Chart reviewed, no new issues for palliative care. We will remain available, floor to notify PRN for needs.

## 2021-11-15 NOTE — PROGRESS NOTES
[facility] THERAPY  STR ICU STEPDOWN TELEMETRY 4K  Modified Barium Swallow    SLP Individual Minutes  Time In: 8891  Time Out: 4687  Minutes: 30  Timed Code Treatment Minutes: 0 Minutes       Date: 11/15/2021  Patient Name: Franca Shen      CSN: [id number]   : 1926  (89 y.o.)  Gender: male   Referring Physician:  Elo Bennett MD  Diagnosis: Troponin level elevation   Secondary Diagnosis: dysphagia, cognitive impairment  Precautions: aspiration, fall risk   History of Present Illness/Injury: Patient admitted to [facility] with above diagnosis. See physician H&P for further details. Per chart review: Dunia Francis a 89 y. o. male with PMHx of prostate cancer status post TURP with history of UTIs, hypertension, hyperlipidemia, hypothyroidism, nephrolithiasis, and osteoarthritis who was brought into the emergency room here at [facility] by EMS on 11/10/2021 with complaints of about a 2-day history of altered mentation increased confusion decreased interaction and inability to care for himself like he normally did.  At the time of evaluation and interview in the ER patient disoriented to time and unable to provide much information about why he is in the emergency room and what happened at home. Yeimi Bond states that he is just unable to get it altogether for some reason.  Per emergency staff notes, patient was brought in because his son realized that patient was not acting himself with increased confusion, decreased interaction and decreased energy, staying in the bathtub for 3 hours with no water despite being instructed by the son to call after he was done showering.  Son also is unsure about whether patient has been taking his medications at home lately and whether he has been taking them correctly.  It appears that patient has been living alone but failing to care for herself and needs higher level of care at this time.  Denies any nausea vomiting PHASE FARIDA SCORE: (Dysphagia outcome and severity scale)  4 = Mild-Moderate Dysphagia - One or two consistencies restricted - may exhibit one or more of the following: Residue clears with cue, Aspiration of one consistency with weak or no reflexive cough, Laryngeal penetration to the vocal cords with cough with two consistencies, Laryngeal penetration to the vocal cords without cough on one consistency    EVIDENCE FOR LARYNGEAL PENETRATION AND/OR ASPIRATION:  Laryngeal penetration evident with nectar/thin  Audible aspiration evident with thin    PENETRATION-ASPIRATION SCALE (PAS): Thin Liquids: 7 = Material enters the airway, passes below the vocal folds, and is not ejected from the trachea despite effort  Mildly Thick Liquids:  3 = Material enters the airway, remains above the vocal folds, and is not ejected from the airway      ESOPHAGEAL PHASE:   No significant findings    ATTEMPTED TECHNIQUES:  Small Bolus Size Effective    Straw Effective    Cup Effective    Chin Tuck Ineffective    Head Turn Not Attempted    Spoon Presentations Effective    Volitional Cough Not Attempted    Spontaneous Cough Effective           DIAGNOSTIC IMPRESSIONS:   Patient presents with mild to moderate oropharyngeal dysphagia as evidenced by the skilled findings within formal instrumental assessment. As related to the oral phase, patient presents with decreased posterior tongue base control, decreased/uncoordinated AP transit, and decreased bolus formation resulting in moderate to severe premature spillage of liquids to the level of the valleculae - improved with viscosity and solids. Also, pt presents with decreased tongue based retraction & decreased hyo-laryngeal excursion/elevation, poor pharyngeal constriction resulting in diminished/incomplete epiglottic inversion and pharyngeal residue.  Moderate-severe residue evident in vallecular space with mild pyriform sinus residue - patient inconsistent in clearance of residual materials - requires verbal cues to complete multiple swallows consistently/more frequently. Mild residue noted along aryepiglottic folds of thin liquids; which was not cleared despite pts attempts. Aforementioned oropharyngeal deficits resulted in decreased airway protection as documented below:  Tracheal aspiration/Laryngeal Penetration:  PO intake of thin liquids revealed deep tracheal aspiration occurring before onset and during swallow initiation passing below vocal folds - reflexive cough NOT effective in clearing material from airway. Thin liquid with chin tuck noted to be ineffective with deep tracheal penetration that did not clear. PO intake of mildly thickened liquid revealed moderate stasis in valleculae that was inconsistently cleared via prompted multiple swallow with deep penetration. Patient self engaging in reflexive throat clearing, indicative of adequate phayrngeal sensation; however, inconsistent in clearing material from airway. PO intake of honey via cup largely unremarkable with no evident penetration/aspiration occurring. Moderately thick liquids: no laryngeal penetration or tracheal aspiration     Due to patient's decreased level of cognition, generalized weakness of structures and inconsistent effectiveness of compensatory strategies, ST recommends patient consume a regular diet with moderately thickened liquids while maintaining neutral head positioning. Intermittent throat clearing at bedside NOT indicative of aspiration event. ST to continue dysphagia tx to determine potentially readiness for dietary upgrade.        Diet Recommendations:  Regular/moderately thickened liquids  Strategies:  Full Upright Position, Small Bite/Sip, Multiple Swallow and Pulmonary Monitoring   Rehabilitation Potential: good    EDUCATION:  Learner: Patient  Education:  Reviewed results and recommendations of this evaluation, Reviewed diet and strategies and Reviewed recommendations for follow-up  Evaluation of Education: Verbalizes understanding and Family not present    PLAN:  Skilled SLP intervention on acute care 3-5 x per week or until goals met and/or pt plateaus in function. Specific interventions for next session may include: dysphagia tx and cognitive tx. PATIENT GOAL:    Did not state. Will further assess during treatment. SHORT TERM GOALS:  Short-term Goals  Timeframe for Short-term Goals: 2 weeks  Goal 1: Patient will consume a regular diet with moderately thickened liquids without overt s/s of penetration/aspiration to maintain adequate nutrition/hydration measures  Goal 2: Patient will complete advanced PO trials of liquids without s/s of penetration/aspiration while monitoring pulmonary status to determine potential readiness for dietary upgrade. Goal 3: Patient will complete basic orientation and STM of 3 units of information with 60% accuracy given mod cues to improve awareness and retention of information in immediate environment. Goal 4: Patient will complete basic problem solving and reasoning tasks with 60% accuracy given mod cues to improve safety within current and home environment. Goal 5: Patient will complete thought organizaion and sequencing tasks with 60% accuracy given mod cues to improve mental flexibility and participation in ADL tasks.       LONG TERM GOALS:  No LTG's established d/t short [address], [address]

## 2021-11-16 LAB
BLOOD CULTURE, ROUTINE: NORMAL
BLOOD CULTURE, ROUTINE: NORMAL

## 2021-11-16 PROCEDURE — 6370000000 HC RX 637 (ALT 250 FOR IP): Performed by: PEDIATRICS

## 2021-11-16 PROCEDURE — 97110 THERAPEUTIC EXERCISES: CPT

## 2021-11-16 PROCEDURE — 1200000000 HC SEMI PRIVATE

## 2021-11-16 PROCEDURE — 96372 THER/PROPH/DIAG INJ SC/IM: CPT

## 2021-11-16 PROCEDURE — 2580000003 HC RX 258: Performed by: PEDIATRICS

## 2021-11-16 PROCEDURE — 99232 SBSQ HOSP IP/OBS MODERATE 35: CPT | Performed by: INTERNAL MEDICINE

## 2021-11-16 PROCEDURE — 97530 THERAPEUTIC ACTIVITIES: CPT

## 2021-11-16 PROCEDURE — 6360000002 HC RX W HCPCS: Performed by: PEDIATRICS

## 2021-11-16 PROCEDURE — 6370000000 HC RX 637 (ALT 250 FOR IP): Performed by: STUDENT IN AN ORGANIZED HEALTH CARE EDUCATION/TRAINING PROGRAM

## 2021-11-16 RX ADMIN — MICONAZOLE NITRATE: 20 POWDER TOPICAL at 21:43

## 2021-11-16 RX ADMIN — MICONAZOLE NITRATE: 20 POWDER TOPICAL at 07:47

## 2021-11-16 RX ADMIN — SODIUM CHLORIDE, PRESERVATIVE FREE 10 ML: 5 INJECTION INTRAVENOUS at 21:42

## 2021-11-16 RX ADMIN — AMLODIPINE BESYLATE 10 MG: 10 TABLET ORAL at 07:46

## 2021-11-16 RX ADMIN — LEVOTHYROXINE SODIUM 50 MCG: 0.05 TABLET ORAL at 05:19

## 2021-11-16 RX ADMIN — SODIUM CHLORIDE, PRESERVATIVE FREE 10 ML: 5 INJECTION INTRAVENOUS at 07:46

## 2021-11-16 RX ADMIN — ENOXAPARIN SODIUM 40 MG: 100 INJECTION SUBCUTANEOUS at 11:06

## 2021-11-16 NOTE — PROGRESS NOTES
Hospitalist Progress Note       Patient:  Emily Castaneda      Unit/Bed:4K-15/015-A    YOB: 1926    MRN: [id number]       Acct: [de-identified]     PCP: OLGA Hooks CNP    Date of Admission: 11/10/2021    Assessment/Plan:     #AMS likely 2/2 dehydration in setting of progressing Dementia: Could this have been a metabolic encephalopathy from a multitude of etiologies w/hypoxia as the dominating factor? More information from the son Rodolfo Hunter appears to show that pt was found in bath tub, with no water left in it, a stand alone heater was blaring and had the temperature of the bathroom to a staggering degree. Appears to be a multifactorial combination of worsening dementia, uncontrolled hypothyroidism and dehydration leading to presentation on admission. MOCA assessment, pt scored 11/30. Severe hypothyroidism can mimic dementia and the presenting symptoms as well. - Psych evaluation pending for complete assessment of pt's overall clinical status   - Vitamin B12 & Folate ordered to evaluate for other causes - both wnl     #Asymptomatic bacteruria in setting of Hx of Chronic UTI/Incontinence: Likely colonized bacteria. Multiple previous cultures show E faecalis, Strep agalactiae, Staph aureus, and Staph epi. Received ceftriaxone on admission. Switched to meropenem for possible ESBL and follow cultures for sensitivities. - D/c Abx as it does not appear to be a UTI at this time      #Hypothyroidism, uncontrolled: non-adherence to medication. .8, Free T4 0.56. Per pts daughter the patient has had iatrogenic destruction of the thyroid, but uncertain of exact details. This was also unconfirmed via chart review. 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 GFR 29  - Avoid nephrotoxic agents and renally dose medications  - Daily standing weights, strict I/O  - Daily BMP      #HTN : Controlled.   - Continue amlodipine with holding parameters     #Hx of recognition software. It may contain minor errors which are inherent in voice recognition technology. **      Final report electronically signed by Dr. Sandra Borrero on 11/15/2021 11:18 AM      CT HEAD WO CONTRAST   Final Result   FINDINGS: Generalized moderate to severe volume loss. Chronic small vessel ischemic disease changes. No hemorrhage. No acute infarction. Ventricles are normal. Marked calcific atherosclerosis of the vertebral arteries. Calcific atherosclerosis of the    cavernous portions of the internal carotid arteries. Calvarium is intact. Visualized paranasal sinuses and mastoid air cells are clear. Deviation of the nasal septum. IMPRESSION:  No acute intracranial pathology            **This report has been created using voice recognition software. It may contain minor errors which are inherent in voice recognition technology. **      Final report electronically signed by Dr. Sandra Borrero on 11/11/2021 12:32 AM      XR CHEST PORTABLE   Final Result   Left lung infiltrates. **This report has been created using voice recognition software. It may contain minor errors which are inherent in voice recognition technology. **      Final report electronically signed by Dr. Sandra Borrero on 11/10/2021 11:51 PM          DVT prophylaxis: [x] Lovenox                                 [] SCDs                                 [] SQ Heparin                                 [] Encourage ambulation           [] Already on Anticoagulation     Code Status: Limited    Tele:   [x] yes             [] no    Active Hospital Problems    Diagnosis Date Noted    Late onset Alzheimer's dementia with behavioral disturbance ([facility] [address] [G30.1, F02.81] 66/53/1299    Metabolic encephalopathy [D23.37] 11/14/2021    Asymptomatic bacteriuria [R82.71]        Electronically signed by Dru Byers MD on 11/16/2021 at 7:48 AM

## 2021-11-16 NOTE — PROGRESS NOTES
[facility]  Occupational Therapy  Daily Note  Time:   Time In: 7771  Time Out: 1046  Timed Code Treatment Minutes: 23 Minutes  Minutes: 23          Date: 2021  Patient Name: Franca Shen,   Gender: male      Room: 6E-67/067-A  MRN: [id number]  : 1926  (89 y.o.)  Referring Practitioner: Lisandro Huffman. Shun Aggarwal MD  Diagnosis: chronic UTI  Additional Pertinent Hx: per chart review; Franca Shen is a 80 y.o. male with PMHx of prostate cancer status post TURP with history of UTIs, hypertension, hyperlipidemia, hypothyroidism, nephrolithiasis, and osteoarthritis who was brought into the emergency room here at [facility] by EMS on 11/10/2021 with complaints of about a 2-day history of altered mentation increased confusion decreased interaction and inability to care for himself like he normally did. At the time of evaluation and interview in the ER patient disoriented to time and unable to provide much information about why he is in the emergency room and what happened at home. He states that he is just unable to get it altogether for some reason. Per emergency staff notes, patient was brought in because his son realized that patient was not acting himself with increased confusion, decreased interaction and decreased energy, staying in the bathtub for 3 hours with no water despite being instructed by the son to call after he was done showering. Son also is unsure about whether patient has been taking his medications at home lately and whether he has been taking them correctly. It appears that patient has been living alone but failing to care for herself and needs higher level of care at this time. Denies any nausea vomiting abdominal pain or diarrhea. No fevers or chills or sore throat or shortness of breath.   No chest pain    Restrictions/Precautions:  Restrictions/Precautions: General Precautions, Fall Risk  Position Activity Restriction  Other position/activity restrictions: increased confusion/AMS      SUBJECTIVE: Pt seated in bedside chair upon arrival, agreeable to OT session. Telesitter in room. Pt confused throughout session, thinking in [place], then [facility] at times. PAIN: 0/10:      Vitals: Vitals not assessed per clinical judgement, see nursing flowsheet    COGNITION: Slow Processing, Decreased Recall, Decreased Insight, Impaired Memory, Decreased Problem Solving, Decreased Safety Awareness, Impaired Attention and Impulsive  Significantly impaired attention to task. ADL:   No ADL's completed this session. Rosi Chamorro BALANCE:  Sitting Balance:  Stand By Assistance. Standing Balance: Contact Guard Assistance. for static; min assist for dynamic    BED MOBILITY:  Not Tested    TRANSFERS:  Sit to Stand:  [employer], cues for hand placement. with additional cues for safety  Stand to Sit: [employer], with verbal cues. FUNCTIONAL MOBILITY:  Assistive Device: Rolling Walker  Assist Level:  Contact Guard Assistance. Distance: 2 laps in room  Minimal assistance without use of RW. Improved stability with use of RW although does still require moderate cues for safety. ADDITIONAL ACTIVITIES:  Pt completed BUE minimal resistance theraband exercises while seated in chair. Pt completed 1 set X 10 repetitions in all planes with short rest breaks in between variations. Max cues for attention to task, quality of movement with exercises. Pt easily distracted, does not complete all reps of exercises and switches to different exercise/topic of conversation. Exercises completed to increase overall strength/endurance needed for ADLs and transfers. ASSESSMENT:     Activity Tolerance:  Patient tolerance of  treatment: fair.         Discharge Recommendations: ECF with OT  Equipment Recommendations: Equipment Needed: No  Plan: Times per week: 3-5x  Times per day: Daily  Current Treatment Recommendations: Strengthening, Endurance Training, Neuromuscular Re-education, Patient/Caregiver Education & Training, Self-Care / ADL, Safety Education & Training    Patient Education  Patient Education: Plan of Care, Home Exercise Program, Importance of Increasing Activity and Assistive Device Safety    Goals  Short term goals  Time Frame for Short term goals: by discharge  Short term goal 1: patient will tolerate 7 min functional standing with two hand release with MOD I and 1-2 verbal cues for safety to increase activity tolerance for ADL routine. Short term goal 2: patient will complete functional mobility house hold distances with (S) with 1-2 cues for safety and awareness of surroundings to increase ease with safely transitioning home. Short term goal 3: patient will tolerate moderate resistive UB exer to increase UB strength for self care routine. Short term goal 4: patient will complete ADL routine with MOD I with distant (S) and 1-2 verbal cues for safety and sequencing. Following session, patient left in safe position with all fall risk precautions in place.

## 2021-11-16 NOTE — PROGRESS NOTES
Physician Progress Note      PATIENT:               Roel Jackson  CSN #:                  [id number]  :                       1926  ADMIT DATE:       11/10/2021 10:18 PM  [address] [place] DATE:  RESPONDING  PROVIDER #:        Debbi Mendoza MD          QUERY TEXT:    Patient admitted with Worsening Dementia, dehydration , Metabolic   encephalopathy VADIM  . Documentation reflects Sepsis secondary to UTI and   Pneumonia  in H&P note dated 21 . If possible, please make selection   below ,document in the progress notes and discharge summary if Sepsis  was: The medical record reflects the following:  Risk Factors: advanced age, worsening dementia, VADIM, metabolic encephalopathy,   non compliance with medications  Clinical Indicators: UA - now asymptomatic bacteruria, Pneumonia, only in    documentation with Empiric coverage with Rocephin and azithromycin for   commune acquired pneumonia. X, 1 day IV antibiotics  Treatment: Admission, imaging, labs, fluids, bolus and infusion for   rehydration , IV antibiotics - empiric    Thank Jono Morrison RN, BSN, [employer]   Clinical   P: [phone]  F: [phone]  Options provided:  -- Sepsis, UTI and Pneumonia  ruled out after study  -- Sepsis ruled out after study  -- Sepsis POA and  confirmed after study  -- Sepsis  treated and resolved  -- Other - I will add my own diagnosis  -- Disagree - Not applicable / Not valid  -- Disagree - Clinically unable to determine / Unknown  -- Refer to Clinical Documentation Reviewer    PROVIDER RESPONSE TEXT:    Sepsis , UTI and Pneumonia ruled out after study.     Query created by: Radhika Merrill on 2021 12:39 PM      Electronically signed by:  Debbi Mendoza MD 2021 1:59 PM

## 2021-11-16 NOTE — DISCHARGE INSTR - COC
Continuity of Care Form    Patient Name: Shayan Ivy   :  1926  MRN:  [id number]    Admit date:  11/10/2021  Discharge date:  ***    Code Status Order: Limited   Advance Directives:      Admitting Physician:  Mayuri Duggan MD  PCP: Edis Valentin, APRN - CNP    Discharging Nurse: [facility] Unit/Room#: 6E-67/067-A  Discharging Unit Phone Number: [phone]    Emergency Contact:   Extended Emergency Contact Information  Primary Emergency Contact: Carolyn Owusu [facility] of [address] [facility] Phone: [phone]  Mobile Phone: [phone]  Relation: Child   needed? No  Secondary Emergency Contact: [address] [facility] of [address] [facility] Phone: [phone]  Mobile Phone: [phone]  Relation: Child   needed?  No    Past Surgical History:  Past Surgical History:   Procedure Laterality Date    ABDOMEN SURGERY      CYSTOSCOPY N/A 2019    CYSTOSCOPY TRANSURETHRAL RESECTION OF PROSTATE AND BLADDER  NECK performed by Irineo Martinez MD at [address] [facility] 2019    CYSTOSCOPY, EVACUATION OF CLOTS, FULGERATION OF BLEEDERS performed by Diane Maurice MD at [address] [facility] 2017    with mesh--Dr. 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    S/P TURP Z90.79    Hypothyroidism E03.9    Chronic UTI N39.0    Altered mental status R41.82    Asymptomatic bacteriuria R82.71    Late onset Alzheimer's dementia with behavioral disturbance ([facility] [address] G30.1, P64.81    Metabolic encephalopathy C37.38       Isolation/Infection:   Isolation            No Isolation          Patient Infection Status       Infection Onset Added Last Indicated Last Indicated By Review Planned Expiration Resolved Resolved By    None active    Resolved    COVID-19 Rule Out 11/10/21 11/10/21 11/10/21 COVID-19, Rapid (Ordered)   11/11/21 Rule-Out Test Resulted            Nurse Assessment:  Last Vital Signs: BP (!) 139/92   Pulse 82   Temp 97.6 °F (36.4 °C) (Oral)   Resp 18   Ht 6' (1.829 m)   Wt 184 lb 9.6 oz (83.7 kg)   SpO2 98%   BMI 25.04 kg/m²     Last documented pain score (0-10 scale): Pain Level: 0  Last Weight:   Wt Readings from Last 1 Encounters:   11/16/21 184 lb 9.6 oz (83.7 kg)     Mental Status:   patient oriented to person, occasionally time and place    IV Access:  - None    Nursing Mobility/ADLs:  Walking   Assisted  Transfer  Assisted  Bathing  Assisted  Dressing  Assisted  Toileting  Assisted  Feeding  Assisted  Med Admin  Assisted  Med Delivery   whole    Wound Care Documentation and Therapy:        Elimination:  Continence: Bowel: No  Bladder: No  Urinary Catheter: None   Colostomy/Ileostomy/Ileal Conduit: No       Date of Last BM: ***    Intake/Output Summary (Last 24 hours) at 11/16/2021 1223  Last data filed at 11/16/2021 0923  Gross per 24 hour   Intake 310 ml   Output 0 ml   Net 310 ml     I/O last 3 completed shifts:   In: 400 [P.O.:390; I.V.:10]  Out: 0     Safety Concerns:     Sund[employer] [place], History of Falls (last 30 days), At Risk for Falls, and Aspiration Risk    Impairments/Disabilities:      Vision    Nutrition Therapy:  Current Nutrition Therapy:   - Regular diet with moderately thickened liquids, honey consistency    Routes of Feeding: Oral  Liquids: Honey Thick Liquids  Daily Fluid Restriction: no  Last Modified Barium Swallow with Video (Video Swallowing Test): {Done Not Done

## 2021-11-17 PROCEDURE — 6370000000 HC RX 637 (ALT 250 FOR IP): Performed by: STUDENT IN AN ORGANIZED HEALTH CARE EDUCATION/TRAINING PROGRAM

## 2021-11-17 PROCEDURE — 99232 SBSQ HOSP IP/OBS MODERATE 35: CPT | Performed by: INTERNAL MEDICINE

## 2021-11-17 PROCEDURE — 1200000000 HC SEMI PRIVATE

## 2021-11-17 PROCEDURE — 92526 ORAL FUNCTION THERAPY: CPT

## 2021-11-17 PROCEDURE — 6370000000 HC RX 637 (ALT 250 FOR IP): Performed by: PEDIATRICS

## 2021-11-17 PROCEDURE — 6360000002 HC RX W HCPCS: Performed by: PEDIATRICS

## 2021-11-17 PROCEDURE — 96372 THER/PROPH/DIAG INJ SC/IM: CPT

## 2021-11-17 PROCEDURE — 97116 GAIT TRAINING THERAPY: CPT

## 2021-11-17 PROCEDURE — 2580000003 HC RX 258: Performed by: PEDIATRICS

## 2021-11-17 PROCEDURE — 97129 THER IVNTJ 1ST 15 MIN: CPT

## 2021-11-17 RX ADMIN — ENOXAPARIN SODIUM 40 MG: 100 INJECTION SUBCUTANEOUS at 12:34

## 2021-11-17 RX ADMIN — SODIUM CHLORIDE, PRESERVATIVE FREE 10 ML: 5 INJECTION INTRAVENOUS at 21:36

## 2021-11-17 RX ADMIN — MICONAZOLE NITRATE: 20 POWDER TOPICAL at 21:36

## 2021-11-17 RX ADMIN — AMLODIPINE BESYLATE 10 MG: 10 TABLET ORAL at 10:11

## 2021-11-17 RX ADMIN — LEVOTHYROXINE SODIUM 50 MCG: 0.05 TABLET ORAL at 06:53

## 2021-11-17 RX ADMIN — SODIUM CHLORIDE, PRESERVATIVE FREE 10 ML: 5 INJECTION INTRAVENOUS at 09:51

## 2021-11-17 RX ADMIN — MICONAZOLE NITRATE: 20 POWDER TOPICAL at 09:51

## 2021-11-17 NOTE — PROGRESS NOTES
[address] [address]  INPATIENT PHYSICAL THERAPY  DAILY NOTE  [facility] - [id number]    Time In: 6036  Time Out: 1102  Timed Code Treatment Minutes: 13 Minutes  Minutes: 13          Date: 2021  Patient Name: Allyson Garcia,  Gender:  male        MRN: [id number]  : 1926  (89 y.o.)     Referring Practitioner: Satish Clark MD  Diagnosis: elevated troponin level  Additional Pertinent Hx: Per chart Matheus Forbes is a 80 y.o. male with PMHx of prostate cancer status post TURP with history of UTIs, hypertension, hyperlipidemia, hypothyroidism, nephrolithiasis, and osteoarthritis who was brought into the emergency room here at [facility] by EMS on 11/10/2021 with complaints of about a 2-day history of altered mentation increased confusion decreased interaction and inability to care for himself like he normally did. At the time of evaluation and interview in the ER patient disoriented to time and unable to provide much information about why he is in the emergency room and what happened at home. He states that he is just unable to get it altogether for some reason. Per emergency staff notes, patient was brought in because his son realized that patient was not acting himself with increased confusion, decreased interaction and decreased energy, staying in the bathtub for 3 hours with no water despite being instructed by the son to call after he was done showering. Son also is unsure about whether patient has been taking his medications at home lately and whether he has been taking them correctly. It appears that patient has been living alone but failing to care for herself and needs higher level of care at this time. Denies any nausea vomiting abdominal pain or diarrhea. No fevers or chills or sore throat or shortness of breath. No chest pain. \"     Prior Level of Function:  Lives With: Alone  Type of Home: House  Home Layout: Two level, Bed/Bath upstairs  Home Access: Stairs to enter with rails  Entrance Stairs - Number of Steps: 2   Bathroom Shower/Tub: Tub/Shower unit  Bathroom Toilet: Standard  Bathroom Equipment: Grab bars in shower  Bathroom Accessibility: Accessible    Receives Help From: Family  ADL Assistance: Independent  Homemaking Assistance: Independent  Ambulation Assistance: Independent  Transfer Assistance: Independent  Active : Yes  Additional Comments: pt is a poor historian, unabel to get accurate or much information from pt due to AMS at this time. Hx taken from chart. reports he uses no AD prior to admit. Restrictions/Precautions:  Restrictions/Precautions: General Precautions, Fall Risk  Position Activity Restriction  Other position/activity restrictions: increased confusion/AMS     SUBJECTIVE: Pt in bedside chair, motivated and pleasant for PT and ambualtion and ex. PAIN: 0/10:     Vitals: Vitals not assessed per clinical judgement, see nursing flowsheet    OBJECTIVE:  Bed Mobility:  Supine to Sit: Contact Guard Assistance    Transfers:  Sit to Stand: Contact Guard Assistance, X 1, with increased time for completion, cues for hand placement, to/from chair with arms  Stand to [address] [address], X 1, with increased time for completion, cues for hand placement, to/from chair with arms    Ambulation:  Contact Guard Assistance, Minimal Assistance, X 1  Distance: 50 feet X 1   Surface: Level Tile  Device:Rolling Walker  Gait Deviations: Forward Flexed Posture, Mild Path Deviations and Pt had one episode with initail walk where he was going to fast and it took tactile cues to slow him down and assist to place all 4 contacts of the RW on the floor.   After that pt mainly CGA     Balance:  Static Sitting Balance:  Supervision  Static Standing Balance: Contact Guard Assistance  Dynamic Standing Balance: Contact Guard Assistance, Minimal Assistance    Exercise:  Patient was guided in 1 set(s) 10 reps of exercise to both lower extremities. Hip abduction/adduction, Straight leg raises, Seated marches, Seated heel/toe raises and Long arc quads. Exercises were completed for increased independence with functional mobility. Functional Outcome Measures: Completed  AM-PAC Inpatient Mobility Raw Score : 16  AM-PAC Inpatient T-Scale Score : 40.78    ASSESSMENT:  Assessment: Patient progressing toward established goals. Activity Tolerance:  Patient tolerance of  treatment: good. Equipment Recommendations:Equipment Needed: No  Discharge Recommendations: Subacte/Skilled Nursing Facility  Plan: Times per week: 3-5x GM  Current Treatment Recommendations: Strengthening, Neuromuscular Re-education, Home Exercise Program, Safety Education & Training, Balance Training, Endurance Training, Patient/Caregiver Education & Training, Positioning    Patient Education  Patient Education: Home Exercise Program, Transfers, Gait    Goals:  Patient goals : unable to state  Short term goals  Time Frame for Short term goals: by discharge  Short term goal 1: Pt will demo SBA for bed mobility tasks with modifications to get in and out of bed  Short term goal 2: Pt to complete sit to stand and return with safe technique with SBA to get on and off various surfaces  Short term goal 3: Pt will ambulate with/without RW 50 feet with SBA and straight pathand no LOB to walk safely household distances  Short term goal 4: NA  Long term goals  Time Frame for Long term goals : NA due to short ELOS    Following session, patient left in safe position with all fall risk precautions in place.

## 2021-11-17 NOTE — PROGRESS NOTES
[facility]  INPATIENT SPEECH THERAPY  [facility] 6E  DAILY NOTE    TIME   SLP Individual Minutes  Time In: 7044  Time Out: 1520  Minutes: 16  Timed Code Treatment Minutes: 8 Minutes     Cog tx; 8 minutes   Dysphagia tx: 8 minutes     Date: 2021  Patient Name: Cynthia Rain      CSN: [id number]   : 1926  (89 y.o.)  Gender: male   Diagnosis: Troponin level elevation   Secondary Diagnosis: dysphagia, cognitive impairment  Precautions: aspiration, fall risk   Current Diet: Regular and Moderately thick liquids  Swallowing Strategies: Full Upright Position, Small Bite/Sip, Multiple Swallow and Pulmonary Monitoring    Date of Last MBS/FEES: 11/15/2021    Pain:  No pain reported. Subjective:  RN Nivia Chavez approved completion of dysphagia tx and cog tx this date. Upon arrival to room, patient awake in recliner. Patient agreeable to skilled ST services. Pleasant and cooperative with noted confusion. Short-Term Goals:  SHORT TERM GOAL #1:  Goal 1: Patient will consume a regular diet with moderately thickened liquids without overt s/s of penetration/aspiration to maintain adequate nutrition/hydration measures  INTERVENTIONS: ST completed PO trials of hard/coarse texture (anca cracker) with moderately thick liquids via cup. Patient with adequate labial seal, suspected adequate bolus control/containment and manipulation, timely/coordinated mastication, adequate bolus formation, suspected mildly delayed swallow initiation d/t presence of audible swallow intermittently, suspected pharyngeal residue evident by utilization of multiple swallows, and intermittent throat clearing noted during po trials this date. ST completed review of MBS results and rationale for thickened liquids. ST defined aspiration and educated consequences of aspiration. Patient stated understanding; however, question comprehension of information d/t level of cognitive impairment.  At this time, it is recommended patient resume with regular texture diet and moderately thick liquids at this time with intermittent supervision      SHORT TERM GOAL #2:  Goal 2: Patient will complete advanced PO trials of liquids without s/s of penetration/aspiration while monitoring pulmonary status to determine potential readiness for dietary upgrade. INTERVENTIONS: Did not address this session d/t focus on other goals. SHORT TERM GOAL #3:  Goal 3: Patient will complete basic orientation and STM of 3 units of information with 60% accuracy given mod cues to improve awareness and retention of information in immediate environment. INTERVENTIONS:  Orientation  Place -  [facility] - independent  Year- max cues   Month - mod cues with presence of daily calendar  BESS - mod cues with presence of daily calendar  Date - mod cues with presence of daily calendar  Upcoming Holiday - total assist      SHORT TERM GOAL #4:  Goal 4: Patient will complete basic problem solving and reasoning tasks with 60% accuracy given mod cues to improve safety within current and home environment. INTERVENTIONS:   Call Light   - independently identified need for call light  - total assist with locating call light   - identified 2/3 scenarios call light would be needed. *suspect POOR carryover of call light   *recommend intermittent supervision for safety    Basic Home Safety   - 4/5 independently, 1/5 given multiple choice cue in a FO2  *did not recall 911 as emergency number    SHORT TERM GOAL #5:  Goal 5: Patient will complete thought organizaion and sequencing tasks with 60% accuracy given mod cues to improve mental flexibility and participation in ADL tasks. INTERVENTIONS: Did not address this session d/t focus on other goals.        Long-Term Goals:  No long term goals recommended d/t short ELOS              EDUCATION:  Learner: Patient  Education:  Reviewed results and recommendations of this evaluation, Reviewed diet and strategies, Reviewed signs, symptoms and risks of aspiration, Education Related to Potential Risks and Complications Due to Impairment/Illness/Injury, Education Related to [employer] and Wellness and Home Safety Education  Evaluation of Education: Verbalizes understanding, Demonstrates with assistance, Needs further instruction, No evidence of learning and Family not present    ASSESSMENT/PLAN:  Activity Tolerance:  Patient tolerance of  treatment: good. Pleasant and cooperative     Assessment/Plan: Patient progressing toward established goals. Continues to require skilled care of licensed speech pathologist to progress toward achievement of established goals and plan of care. .     Plan for Next Session: PO trials and cog tx     [facility]) [address] M[address]

## 2021-11-18 LAB
ANION GAP SERPL CALCULATED.3IONS-SCNC: 12 MEQ/L (ref 8–16)
BUN BLDV-MCNC: 40 MG/DL (ref 7–22)
CALCIUM SERPL-MCNC: 9.1 MG/DL (ref 8.5–10.5)
CHLORIDE BLD-SCNC: 106 MEQ/L (ref 98–111)
CO2: 25 MEQ/L (ref 23–33)
CREAT SERPL-MCNC: 1.4 MG/DL (ref 0.4–1.2)
GFR SERPL CREATININE-BSD FRML MDRD: 47 ML/MIN/1.73M2
GLUCOSE BLD-MCNC: 83 MG/DL (ref 70–108)
POTASSIUM REFLEX MAGNESIUM: 3.8 MEQ/L (ref 3.5–5.2)
SODIUM BLD-SCNC: 143 MEQ/L (ref 135–145)
T4 FREE: 0.54 NG/DL (ref 0.93–1.76)

## 2021-11-18 PROCEDURE — 6360000002 HC RX W HCPCS: Performed by: PEDIATRICS

## 2021-11-18 PROCEDURE — 2580000003 HC RX 258: Performed by: PEDIATRICS

## 2021-11-18 PROCEDURE — 99232 SBSQ HOSP IP/OBS MODERATE 35: CPT | Performed by: INTERNAL MEDICINE

## 2021-11-18 PROCEDURE — 80048 BASIC METABOLIC PNL TOTAL CA: CPT

## 2021-11-18 PROCEDURE — 1200000000 HC SEMI PRIVATE

## 2021-11-18 PROCEDURE — 6370000000 HC RX 637 (ALT 250 FOR IP): Performed by: INTERNAL MEDICINE

## 2021-11-18 PROCEDURE — 97129 THER IVNTJ 1ST 15 MIN: CPT

## 2021-11-18 PROCEDURE — 97116 GAIT TRAINING THERAPY: CPT

## 2021-11-18 PROCEDURE — 96372 THER/PROPH/DIAG INJ SC/IM: CPT

## 2021-11-18 PROCEDURE — 84439 ASSAY OF FREE THYROXINE: CPT

## 2021-11-18 PROCEDURE — 92526 ORAL FUNCTION THERAPY: CPT

## 2021-11-18 PROCEDURE — 6370000000 HC RX 637 (ALT 250 FOR IP): Performed by: PEDIATRICS

## 2021-11-18 PROCEDURE — 6370000000 HC RX 637 (ALT 250 FOR IP): Performed by: STUDENT IN AN ORGANIZED HEALTH CARE EDUCATION/TRAINING PROGRAM

## 2021-11-18 PROCEDURE — 97110 THERAPEUTIC EXERCISES: CPT

## 2021-11-18 PROCEDURE — 36415 COLL VENOUS BLD VENIPUNCTURE: CPT

## 2021-11-18 RX ORDER — LEVOTHYROXINE SODIUM 0.1 MG/1
100 TABLET ORAL DAILY
Status: DISCONTINUED | OUTPATIENT
Start: 2021-11-19 | End: 2021-11-22

## 2021-11-18 RX ORDER — LEVOTHYROXINE SODIUM 0.05 MG/1
50 TABLET ORAL ONCE
Status: COMPLETED | OUTPATIENT
Start: 2021-11-18 | End: 2021-11-18

## 2021-11-18 RX ORDER — LANOLIN ALCOHOL/MO/W.PET/CERES
1000 CREAM (GRAM) TOPICAL DAILY
Status: DISCONTINUED | OUTPATIENT
Start: 2021-11-18 | End: 2021-11-28 | Stop reason: HOSPADM

## 2021-11-18 RX ADMIN — MICONAZOLE NITRATE: 20 POWDER TOPICAL at 20:54

## 2021-11-18 RX ADMIN — LEVOTHYROXINE SODIUM 50 MCG: 0.05 TABLET ORAL at 06:19

## 2021-11-18 RX ADMIN — ENOXAPARIN SODIUM 40 MG: 100 INJECTION SUBCUTANEOUS at 11:19

## 2021-11-18 RX ADMIN — AMLODIPINE BESYLATE 10 MG: 10 TABLET ORAL at 09:10

## 2021-11-18 RX ADMIN — SODIUM CHLORIDE, PRESERVATIVE FREE 10 ML: 5 INJECTION INTRAVENOUS at 09:10

## 2021-11-18 RX ADMIN — Medication 1000 MCG: at 11:54

## 2021-11-18 RX ADMIN — MICONAZOLE NITRATE: 20 POWDER TOPICAL at 09:10

## 2021-11-18 RX ADMIN — Medication 6 MG: at 21:04

## 2021-11-18 RX ADMIN — LEVOTHYROXINE SODIUM 50 MCG: 0.05 TABLET ORAL at 11:54

## 2021-11-18 ASSESSMENT — PAIN SCALES - GENERAL
PAINLEVEL_OUTOF10: 0
PAINLEVEL_OUTOF10: 0

## 2021-11-18 NOTE — PROGRESS NOTES
[address] [address]  INPATIENT SPEECH THERAPY  [facility] 6E  DAILY NOTE    TIME   SLP Individual Minutes  Time In:   Time Out: 18  Minutes: 16  Timed Code Treatment Minutes: 8 Minutes     Cog tx; 8 minutes   Dysphagia tx: 8 minutes     Date: 2021  Patient Name: Bill Meza      CSN: [id number]   : 1926  (89 y.o.)  Gender: male   Diagnosis: Troponin level elevation   Secondary Diagnosis: dysphagia, cognitive impairment  Precautions: aspiration, fall risk   Current Diet: Regular and Moderately thick liquids  Swallowing Strategies: Full Upright Position, Small Bite/Sip, Multiple Swallow and Pulmonary Monitoring    Date of Last MBS/FEES: 11/15/2021    Pain:  No pain reported. Subjective:  ANNIE Briscoe approved ST session on this date. Patient sitting upright in armchair upon ST arrival. Pleasant; albeit confused and disoriented. Short-Term Goals:  SHORT TERM GOAL #1:  Goal 1: Patient will consume a regular diet with moderately thickened liquids without overt s/s of penetration/aspiration to maintain adequate nutrition/hydration measures  INTERVENTIONS: ST completed PO trials of regular sandwich on lunch tray and moderately thickened liquids. PO trials of regular solid revealed adequate oral initiation, slightly prolonged mastication, adequate bolus formation, suspected incoordinated bolus manipulation and suspected timely AP transit and swallow initiation. PO intake of moderately thickened liquids revealed adequate oral initiation, suspected incoordination bolus manipulation, suspected timely AP transit and swallow initiation. Audible swallow present following ALL PO intake of moderately thickened liquids. Intermittent throat clear noted; however, as reported on most recent MBS completed on , is not always indicative of pharyngeal invasion. ST provided patient with education re: current swallow function and rationale of current diet level.  Patient states understanding; however, suspected limited functional carryover of information d/t patient's cognitive impairment. Recommend continuation of regular diet/moderately thickened liquids with intermittent supervision to ensure safety of PO consumption. *ST removed x3 cups of thin liquid from patient's room on this date. Re-educated RN re: patient's current dietary level of moderately thickened liquids/regular solids    SHORT TERM GOAL #2:  Goal 2: Patient will complete advanced PO trials of liquids without s/s of penetration/aspiration while monitoring pulmonary status to determine potential readiness for dietary upgrade. INTERVENTIONS: Did not address this session d/t focus on other goals. SHORT TERM GOAL #3:  Goal 3: Patient will complete basic orientation and STM of 3 units of information with 60% accuracy given mod cues to improve awareness and retention of information in immediate environment. INTERVENTIONS:  Orientation  Place -  [facility] - independent  Year- mod cues with presenceof calendar  Month - mod cues with presence of daily calendar  BESS - mod cues with presence of daily calendar  Date - mod cues with presence of daily calendar  Upcoming Holiday - indep  Birth date: mod cues  Children: Max assist - patient stating, \"I think they range from your age to 10 or 8\". SHORT TERM GOAL #4:  Goal 4: Patient will complete basic problem solving and reasoning tasks with 60% accuracy given mod cues to improve safety within current and home environment. INTERVENTIONS:   Personal Phone Safety: mod assist  Patient received x2 scam phone calls while ST was present in room. Patient with poor reasoning skills re: answering unknown phone numbers (e.g., \"it might be my kids\"). ST educated patient that kids phone numbers should be stored within phone. MAX verbal cue required to have patient hang up phone on scam caller. Patient stating, \"I don't know who that was - he wanted me to buy something\".  Education provided re: telephone safety. Call light: mod assist  *Patient able to locate call light, state it is used to \"get girls attention\". Unable to identify button to press for RN. Education provided - suspect limited functional carryover d/t level of cognitive deficits. *ST STRONGLY recommends intermittent supervision upon hospital d/c to ensure safety within environment    Ania Padron7 #5:  Goal 5: Patient will complete thought organizaion and sequencing tasks with 60% accuracy given mod cues to improve mental flexibility and participation in ADL tasks. INTERVENTIONS: Did not address this session d/t focus on other goals. Long-Term Goals:  No long term goals recommended d/t short ELOS         EDUCATION:  Learner: Patient  Education:  Reviewed results and recommendations of this evaluation, Reviewed diet and strategies, Reviewed signs, symptoms and risks of aspiration, Education Related to Potential Risks and Complications Due to Impairment/Illness/Injury, Education Related to [employer] and Wellness and Home Safety Education  Evaluation of Education: Verbalizes understanding, Demonstrates with assistance, Needs further instruction, No evidence of learning and Family not present    ASSESSMENT/PLAN:  Activity Tolerance:  Patient tolerance of  treatment: good. Pleasant and cooperative     Assessment/Plan: Patient progressing toward established goals. Continues to require skilled care of licensed speech pathologist to progress toward achievement of established goals and plan of care. .     Plan for Next Session: PO trials and cog Valeria Evans, [address]

## 2021-11-18 NOTE — CARE COORDINATION
11/18/21, 11:35 AM EST    DISCHARGE PLANNING EVALUATION      Spoke with [facility]. Precert was denied. Peer to peer can be done by 4:30 today. Spoke with Dr Raúl Winkler, who will complete peer to peer today. Phone number is [phone], opt 4, reference number is [phone], with Dr Nelda Willis. Discussed with RN and also notified care manager.

## 2021-11-18 NOTE — CARE COORDINATION
Discharge Planning Update:     Treating sepsis, UTI, Alzheimers. PT/OT/ST following. Awaiting pre-cert for admit to [facility] Peer to Peer requested. Dr. Iftikhar Mueller notified of this. SW following.

## 2021-11-18 NOTE — PROGRESS NOTES
Hospitalist Progress Note       Patient:  Adrian Miranda      Unit/Bed:6E-67/067-A    YOB: 1926    MRN: [id number]       Acct: [de-identified]     PCP: OLGA Feliz CNP    Date of Admission: 11/10/2021    Assessment/Plan:     #AMS likely 2/2 dehydration in setting of progressing Dementia (seems to be new baseline): Could this have been a metabolic encephalopathy from a multitude of etiologies w/hypoxia as the dominating factor? More information from the son Geovanny Maki appears to show that pt was found in bath tub, with no water left in it, a stand alone heater was blaring and had the temperature of the bathroom to a staggering degree. Appears to be a multifactorial combination of worsening dementia, uncontrolled hypothyroidism and dehydration leading to presentation on admission. MOCA assessment, pt scored 11/30. Severe hypothyroidism can mimic dementia and the presenting symptoms as well. - Psych evaluation pending for complete assessment of pt's overall clinical status   - Vitamin B12 & Folate ordered to evaluate for other causes - both wnl     #Asymptomatic bacteruria in setting of Hx of Chronic UTI/Incontinence: Likely colonized bacteria. Multiple previous cultures show E faecalis, Strep agalactiae, Staph aureus, and Staph epi. Received ceftriaxone on admission. Switched to meropenem for possible ESBL and follow cultures for sensitivities. - D/c Abx as it does not appear to be a UTI at this time      #Hypothyroidism, uncontrolled: non-adherence to medication. .8, Free T4 0.56. Per pts daughter the patient has had iatrogenic destruction of the thyroid, but uncertain of exact details. This was also unconfirmed via chart review. 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 GFR 29  - Avoid nephrotoxic agents and renally dose medications  - Daily standing weights, strict I/O  - Daily BMP      #HTN : Controlled.   - Continue amlodipine with holding parameters     #Hx of prostate cancer: s/p radiation in 2000.     #Hx of bladder stones: Noted. Follows with Dr. Othel Holter (urology) outpatient.     #Code status: Patient's daughter and son are POA. A long conversation with the son was had along with palliative and the decision has been made to change pt to Limited No x4. Expected discharge date:  TBD    Disposition: Unknown at this time        [] Home       [] TCU       [] Rehab       [] Psych       [] SNF       [] [facility]       [] Other-    Chief Complaint: AMS    Hospital Course:     Alix Hodgkins is a 80 y.o. male with PMHx of Prostate cancer s/p TURP, UTIs on Cipro daily, HTN, HLD, hypothyroidism, nephrolithiasis, and OA who presented to [facility] ED by EMS on 11/10/2021 with complaints of 2-day history of altered mentation, increased confusion, decreased interaction, and inability to care for himself like he normally did. Per ED staff notes, pt was brought in because his son realized that patient was not acting himself with increased confusion, decreased interaction and decreased energy, staying in the bathtub for 3 hours with no water despite being instructed by the son to call after he was done showering. Pt's son also is unsure about whether pt has been taking his medications at home lately and whether he has been taking them correctly. On the night of admission, pt's son went to his home after pt failed to answer his phone, pt was found disoriented in bathroom w/no obvious signs of injury. Denied any CP, SOB, N/V, abdominal pain, fever/chills.      ED Summary: Afebrile 98.7, tachycardic 99, RR 16-18, /98, with 90% O2 on RA. Cr 2.1 from a baseline of 1.6, lactic acid of 1.9, pro-Red of 0.1, troponin of 0.057, and a significantly increased TSH of 126.8 with a low free T4 of 0.56. CBC wnl. UA was strongly positive for UTI plus hyaline casts. CXR showed a LION and lingular lobe PNA or infiltrate.  EKG with sinus rhythm and first-degree AV block but no acute ST segment elevation or depression. Pt received ceftriaxone 1g IV x1 and 2.5 L IV fluid boluses. 11/16: Pt pleasant today. Medically he is stable and was transferred off stepdown for the interim while pre-cert to [facility] is started. PT worked with pt today and recommends SNF as well. Will check on pre-cert tomorrow and see what is needed for pt d/c. Subjective (past 24 hours):      Pt A/Ox1, still, but pleasant and conversive. Pt denies any complaints at this time. Medications:  Reviewed    Infusion Medications    sodium chloride       Scheduled Medications    enoxaparin  40 mg SubCUTAneous Daily    sterile water  1.2 mL IntraMUSCular Once    amLODIPine  10 mg Oral Daily    levothyroxine  50 mcg Oral Daily    sodium chloride flush  10 mL IntraVENous 2 times per day    [Held by provider] aspirin  81 mg Oral Daily    miconazole   Topical BID     PRN Meds: potassium chloride **OR** potassium alternative oral replacement **OR** potassium chloride, melatonin, ziprasidone **AND** sterile water, sodium chloride flush, sodium chloride, ondansetron **OR** ondansetron, polyethylene glycol, acetaminophen **OR** acetaminophen, aluminum & magnesium hydroxide-simethicone      Intake/Output Summary (Last 24 hours) at 11/17/2021 2136  Last data filed at 11/17/2021 1824  Gross per 24 hour   Intake 650 ml   Output --   Net 650 ml       Diet:  ADULT DIET; Regular; Moderately Thick (Honey)    Exam:  /66   Pulse 79   Temp 98 °F (36.7 °C) (Oral)   Resp 19   Ht 6' (1.829 m)   Wt 184 lb 9.6 oz (83.7 kg)   SpO2 96%   BMI 25.04 kg/m²     General appearance: No apparent distress, appears stated age and cooperative. HEENT: Pupils equal, round, and reactive to light. Conjunctivae/corneas clear. Neck: Supple, with full range of motion. No jugular venous distention. Trachea midline. Respiratory:  Normal respiratory effort.  Clear to auscultation, bilaterally without Rales/Wheezes/Rhonchi. Cardiovascular: Regular rate and rhythm with normal S1/S2 without murmurs, rubs or gallops. Abdomen: Soft, non-tender, non-distended with normal bowel sounds. Musculoskeletal: passive and active ROM x 4 extremities. Skin: Skin color, texture, turgor normal.    Neurologic:  Neurovascularly intact without any focal sensory/motor deficits. Cranial nerves: II-XII intact, grossly non-focal.  Psychiatric: A/O x1, disorganized speech, tangential at times  Capillary Refill: Brisk,< 3 seconds   Peripheral Pulses: +2 palpable, equal bilaterally       Labs:   No results for input(s): WBC, HGB, HCT, PLT in the last 72 hours. No results for input(s): NA, K, CL, CO2, BUN, CREATININE, CALCIUM, PHOS in the last 72 hours. Invalid input(s): MAGNES  No results for input(s): AST, ALT, BILIDIR, BILITOT, ALKPHOS in the last 72 hours. No results for input(s): INR in the last 72 hours. No results for input(s):  [place] in the last 72 hours. No results for input(s): PROCAL in the last 72 hours. Microbiology:      Urinalysis:      Lab Results   Component Value Date    NITRU NEGATIVE 11/10/2021    WBCUA OBSCURED 11/10/2021    BACTERIA FEW 11/10/2021    RBCUA OBSCURED 11/10/2021    BLOODU LARGE 11/10/2021    SPECGRAV >=1.030 01/13/2020    SPECGRAV 1.013 01/14/2017    GLUCOSEU NEGATIVE 11/10/2021       Radiology:  FL MODIFIED BARIUM SWALLOW W VIDEO   Final Result   Laryngeal penetration with aspiration with thin liquids. Laryngeal penetration without aspiration with nectar thick. Recommendations and comments available from speech therapy            **This report has been created using voice recognition software. It may contain minor errors which are inherent in voice recognition technology. **      Final report electronically signed by Dr. Meena Hill on 11/15/2021 11:18 AM      CT HEAD WO CONTRAST   Final Result   FINDINGS: Generalized moderate to severe volume loss.  Chronic small vessel ischemic disease changes. No hemorrhage. No acute infarction. Ventricles are normal. Marked calcific atherosclerosis of the vertebral arteries. Calcific atherosclerosis of the    cavernous portions of the internal carotid arteries. Calvarium is intact. Visualized paranasal sinuses and mastoid air cells are clear. Deviation of the nasal septum. IMPRESSION:  No acute intracranial pathology            **This report has been created using voice recognition software. It may contain minor errors which are inherent in voice recognition technology. **      Final report electronically signed by Dr. Mari Miranda on 11/11/2021 12:32 AM      XR CHEST PORTABLE   Final Result   Left lung infiltrates. **This report has been created using voice recognition software. It may contain minor errors which are inherent in voice recognition technology. **      Final report electronically signed by Dr. Mari Miranda on 11/10/2021 11:51 PM          DVT prophylaxis: [x] Lovenox                                 [] SCDs                                 [] SQ Heparin                                 [] Encourage ambulation           [] Already on Anticoagulation     Code Status: Limited    Tele:   [x] yes             [] no    Active Hospital Problems    Diagnosis Date Noted    Late onset Alzheimer's dementia with behavioral disturbance ([facility] [address] [G30.1, F02.81] 19/72/8178    Metabolic encephalopathy [J80.97] 11/14/2021    Asymptomatic bacteriuria [R82.71]        Electronically signed by Froy Alonzo MD on 11/17/2021 at 9:36 PM

## 2021-11-18 NOTE — CARE COORDINATION
11/18/21, 2:17 PM EST    DISCHARGE PLANNING EVALUATION       Peer to peer was denied. KATHLEEN spoke with [employer], to start appeal.  A decision will be made within 72 hours.

## 2021-11-18 NOTE — PROGRESS NOTES
Hospitalist Progress Note    Patient:  Marysol Rosado    YOB: 1926  Unit/Bed:6E-67/067-A  MRN: [id number]    Acct: [de-identified]   PCP: OLGA Garcia CNP    Date of Admission: 11/10/2021      Assessment/Plan:      #Acute encephalopathy in setting of Dementia (progressive) likely 2/2 dehydration and possibly transient hypoxia. Per prior notes, \"More information from the son Phillis Severs appears to show that pt was found in bath tub, with no water left in it, a stand alone heater was blaring and had the temperature of the bathroom to a staggering degree\". -also component of uncontrolled hypothyroidism (poor compliance) and B12 deficiency   -see below for management of this  -delirium precautions  -consider for TRICIA screening  -CT head with mod-severe volume loss -> c/w diagnosis of dementia (possible vascular component with atherosclerosis)  -MOCA assessment, pt scored 11/30.   -resolved and seems to be closer to baseline  -patient not safe for home alone, PT/OT on board recommend SNF     #Asymptomatic bacteruria in setting of Hx of Chronic UTI/Incontinence: Likely colonized bacteria. No fevers or leukocytosis. Multiple previous cultures show E faecalis, Strep agalactiae, Staph aureus, and Staph epi. Received ceftriaxone on admission.   - D/c Abx as it does not appear to be a UTI at this time   - No fevers or other symptoms at this time     #Hypothyroidism, uncontrolled: non-adherence to medication. .8, Free T4 0.56. Per pts daughter the patient has had iatrogenic destruction of the thyroid, but uncertain of exact details. This was also unconfirmed via chart review. - Resume levothyroxine at 50mcg, increased to 100mcg on 11/18 as FT4 still 0.5  - recommend repeat TSH and t4 outpatient in 6-8 weeks    #B12 deficiency: in low 200s, starting on oral replacement, may be contributing to symptoms.      #Stage 2 VADIM on CKD IIIb (resolved): Cr 2.1 on baseline of 1.6.  GFR 29  - Avoid nephrotoxic agents and renally dose medications  - repeat bmp on 11/18 stable     #HTN : Controlled with some lability.  - Continue amlodipine with holding parameters     #Hx of prostate cancer: s/p radiation in 2000.     #Hx of bladder stones: Noted. Follows with Dr. Garcia (urology) outpatient.     #Code status: Patient's daughter and son are POA. A long conversation with the son was had along with palliative and the decision has been made to change pt to Limited No x4.          Expected discharge date:  pending    Disposition: SNF, precert denied, appeal is in process. [] Home  [] TCU  [] Rehab  [] Psych  [] SNF  [] [facility]  [] Other-    ===================================================================      Chief Complaint: ams    Subjective (past 24 hours): Patient seen at bedside. Pleasantly confused. Easily reorientable - no complaints at this time. No abdominal discomfort, headache, SOB, dizziness, nausea, diarrhea, constipation. Medications:  Reviewed    Infusion Medications    sodium chloride       Scheduled Medications    [START ON 11/19/2021] levothyroxine  100 mcg Oral Daily    vitamin B-12  1,000 mcg Oral Daily    enoxaparin  40 mg SubCUTAneous Daily    sterile water  1.2 mL IntraMUSCular Once    amLODIPine  10 mg Oral Daily    sodium chloride flush  10 mL IntraVENous 2 times per day    [Held by provider] aspirin  81 mg Oral Daily    miconazole   Topical BID     PRN Meds: potassium chloride **OR** potassium alternative oral replacement **OR** potassium chloride, melatonin, ziprasidone **AND** sterile water, sodium chloride flush, sodium chloride, ondansetron **OR** ondansetron, polyethylene glycol, acetaminophen **OR** acetaminophen, aluminum & magnesium hydroxide-simethicone      ROS: reviewed from prior note, full ROS unchanged unless otherwise stated in hospital course/subjective portion.          Intake/Output Summary (Last 24 hours) at 11/18/2021 1243  Last data filed at 11/18/2021 1305  Gross per 24 hour   Intake 1260 ml   Output --   Net 1260 ml       Exam:  /87   Pulse 69   Temp 97.9 °F (36.6 °C) (Oral)   Resp 18   Ht 6' (1.829 m)   Wt 184 lb 9.6 oz (83.7 kg)   SpO2 97%   BMI 25.04 kg/m²     General appearance: Pleasantly confused, no distress  Eyes:  PERRL. Conjunctivae/corneas clear. HENT: Head normal appearing. Nares normal. Oral mucosa moist.  Hearing intact. Neck: Supple, with full range of motion. Trachea midline. No gross JVD appreciated. Respiratory:  Normal effort. Clear to auscultation, without wheezes or rhonchi. Trace crackles in bases. Cardiovascular: Normal rate, regular rhythm with normal S1/S2 without murmurs. Trace lower extremity edema bilaterally. Abdomen: Soft, non-tender, non-distended with normal bowel sounds. Protuberant  Musculoskeletal: No joint swelling or tenderness. Normal tone. No abnormal movements. Skin: Warm and dry. No rashes or lesions. Neurologic:  No focal sensory/motor deficits in the upper or lower extremities. Cranial nerves:  grossly non-focal 2-12. Psychiatric: Alert and oriented to self only, impaired insight  Capillary Refill: Brisk,< 3 seconds. Peripheral Pulses: +2 palpable, equal bilaterally. Labs:   No results for input(s): WBC, HGB, HCT, PLT in the last 72 hours. Recent Labs     11/18/21  0759      K 3.8      CO2 25   BUN 40*   CREATININE 1.4*   CALCIUM 9.1     No results for input(s): AST, ALT, BILIDIR, BILITOT, ALKPHOS in the last 72 hours. No results for input(s): INR in the last 72 hours. No results for input(s): [place] in the last 72 hours. No results for input(s): PROCAL in the last 72 hours.    Lab Results   Component Value Date    NITRU NEGATIVE 11/10/2021    WBCUA OBSCURED 11/10/2021    BACTERIA FEW 11/10/2021    RBCUA OBSCURED 11/10/2021    BLOODU LARGE 11/10/2021    SPECGRAV >=1.030 01/13/2020    SPECGRAV 1.013 01/14/2017    GLUCOSEU NEGATIVE 11/10/2021 Radiology (48 hours):  No results found. DVT prophylaxis:    [x] Lovenox  [] SCDs  [] SQ Heparin  [] Encourage ambulation   [] Already on Anticoagulation       Diet: ADULT DIET; Regular;  Moderately Thick (Honey)  Code Status: Limited  PT/OT: yes  Tele: no  IVF: no    Electronically signed by Iqra Shea DO on 11/18/2021 at 3:45 PM

## 2021-11-18 NOTE — PROGRESS NOTES
[facility]  INPATIENT PHYSICAL THERAPY  DAILY NOTE  [facility] PEDIATRICS Alison Apurva - 6E-67/067-A    Time In: 3220  Time Out: 0933  Timed Code Treatment Minutes: 23 Minutes  Minutes: 23          Date: 2021  Patient Name: Ramón Castaneda,  Gender:  male        MRN: [id number]  : 1926  (89 y.o.)     Referring Practitioner: Duy Blackwood MD  Diagnosis: elevated troponin level  Additional Pertinent Hx: Per chart Eh Reich is a 80 y.o. male with PMHx of prostate cancer status post TURP with history of UTIs, hypertension, hyperlipidemia, hypothyroidism, nephrolithiasis, and osteoarthritis who was brought into the emergency room here at [facility] by EMS on 11/10/2021 with complaints of about a 2-day history of altered mentation increased confusion decreased interaction and inability to care for himself like he normally did. At the time of evaluation and interview in the ER patient disoriented to time and unable to provide much information about why he is in the emergency room and what happened at home. He states that he is just unable to get it altogether for some reason. Per emergency staff notes, patient was brought in because his son realized that patient was not acting himself with increased confusion, decreased interaction and decreased energy, staying in the bathtub for 3 hours with no water despite being instructed by the son to call after he was done showering. Son also is unsure about whether patient has been taking his medications at home lately and whether he has been taking them correctly. It appears that patient has been living alone but failing to care for herself and needs higher level of care at this time. Denies any nausea vomiting abdominal pain or diarrhea. No fevers or chills or sore throat or shortness of breath. No chest pain. \"     Prior Level of Function:  Lives With: Alone  Type of Home: House  Home Layout: Two level, Bed/Bath upstairs  Home Access: Stairs to enter with rails  Entrance Stairs - Number of Steps: 2   Bathroom Shower/Tub: Tub/Shower unit  Bathroom Toilet: Standard  Bathroom Equipment: Grab bars in shower  Bathroom Accessibility: Accessible    Receives Help From: Family  ADL Assistance: Independent  Homemaking Assistance: Independent  Ambulation Assistance: Independent  Transfer Assistance: Independent  Active : Yes  Additional Comments: pt is a poor historian, unabel to get accurate or much information from pt due to AMS at this time. Hx taken from chart. reports he uses no AD prior to admit. Restrictions/Precautions:  Restrictions/Precautions: General Precautions, Fall Risk  Position Activity Restriction  Other position/activity restrictions: increased confusion/AMS     SUBJECTIVE: RN approved session. Pt resting in bed upon arrival, agreeable to therapy. Pt with decreased safety awareness throughout session. PAIN: 0/10: denies pain    Vitals: Vitals not assessed per clinical judgement, see nursing flowsheet    OBJECTIVE:  Bed Mobility:  Rolling to Left: Stand By Assistance   Supine to Sit: Stand By Assistance    Transfers:  Sit to Stand: [employer], cues for hand placement  Stand to [facility] for hand placement. Pt sits into chair quickly without reaching back and sits on very edge of chair. Decreased safety awareness. Ambulation:  Contact Guard Assistance, Minimal Assistance  Distance: 65 feet x1   Surface: Level Tile  Device:Rolling Walker  Gait Deviations:  Marked deviation to both L and R. Unsteady, especially when turning requiring cuing for safety. Forward flexed posture. Pt is a high fall risk and not safe to be up unassisted. Balance:  Dynamic Standing Balance: Minimal Assistance, Pt completed functional reaching activity requiring pt to reach outside KUNAL from above head to waist level without UE support on AD. Pt requiring a wide KUNAL to maintain stability.     Tinetti balance assessment completed see score below. Exercise:  Patient was guided in 1 set(s) 15 reps of exercise to both lower extremities. Seated marches, Seated hamstring curls, Seated heel/toe raises, Long arc quads and Seated abduction/adduction. Exercises were completed for increased independence with functional mobility. Functional Outcome Measures: Completed  Balance Score: 6  Gait Score: 7  Tinetti Total Score: 13  Risk Indicators:  Less than/equal to 18 = high risk  19-23 Moderate risk  Greater than/equal to 24 = low risk    AM-PAC Inpatient Mobility Raw Score : 16  AM-PAC Inpatient T-Scale Score : 40.78    ASSESSMENT:  Assessment: Patient progressing toward established goals. and Pt tolerated session fairly well. Poor safety awareness and pt is a high fall risk, not safe to return home at this time. Pt would benefit from continued therapy at a SNF. Activity Tolerance:  Patient tolerance of  treatment: good.       Equipment Recommendations:Equipment Needed: No  Discharge Recommendations: Continue to assess pending progress and Subacte/Skilled Nursing Facility  Plan: Times per week: 3-5x GM  Current Treatment Recommendations: Strengthening, Neuromuscular Re-education, Home Exercise Program, Safety Education & Training, Balance Training, Endurance Training, Patient/Caregiver Education & Training, Positioning    Patient Education  Patient Education: Plan of Care, Transfers, Gait    Goals:  Patient goals : unable to state  Short term goals  Time Frame for Short term goals: by discharge  Short term goal 1: Pt will demo SBA for bed mobility tasks with modifications to get in and out of bed  Short term goal 2: Pt to complete sit to stand and return with safe technique with SBA to get on and off various surfaces  Short term goal 3: Pt will ambulate with/without RW 50 feet with SBA and straight pathand no LOB to walk safely household distances  Short term goal 4: NA  Long term goals  Time Frame for Long term goals : NA due to short ELOS    Following session, patient left in safe position with all fall risk precautions in place.

## 2021-11-19 PROCEDURE — 6370000000 HC RX 637 (ALT 250 FOR IP): Performed by: STUDENT IN AN ORGANIZED HEALTH CARE EDUCATION/TRAINING PROGRAM

## 2021-11-19 PROCEDURE — 6360000002 HC RX W HCPCS: Performed by: PEDIATRICS

## 2021-11-19 PROCEDURE — 99232 SBSQ HOSP IP/OBS MODERATE 35: CPT | Performed by: INTERNAL MEDICINE

## 2021-11-19 PROCEDURE — 6370000000 HC RX 637 (ALT 250 FOR IP): Performed by: INTERNAL MEDICINE

## 2021-11-19 PROCEDURE — 92526 ORAL FUNCTION THERAPY: CPT

## 2021-11-19 PROCEDURE — 96372 THER/PROPH/DIAG INJ SC/IM: CPT

## 2021-11-19 PROCEDURE — 97116 GAIT TRAINING THERAPY: CPT

## 2021-11-19 PROCEDURE — 97110 THERAPEUTIC EXERCISES: CPT

## 2021-11-19 PROCEDURE — 2500000003 HC RX 250 WO HCPCS: Performed by: INTERNAL MEDICINE

## 2021-11-19 PROCEDURE — 1200000000 HC SEMI PRIVATE

## 2021-11-19 RX ADMIN — ENOXAPARIN SODIUM 40 MG: 100 INJECTION SUBCUTANEOUS at 12:22

## 2021-11-19 RX ADMIN — AMLODIPINE BESYLATE 10 MG: 10 TABLET ORAL at 09:34

## 2021-11-19 RX ADMIN — LEVOTHYROXINE SODIUM 100 MCG: 0.1 TABLET ORAL at 06:06

## 2021-11-19 RX ADMIN — MICONAZOLE NITRATE: 20 POWDER TOPICAL at 09:34

## 2021-11-19 RX ADMIN — Medication 1000 MCG: at 09:34

## 2021-11-19 RX ADMIN — MICONAZOLE NITRATE: 20 POWDER TOPICAL at 20:00

## 2021-11-19 ASSESSMENT — PAIN DESCRIPTION - LOCATION: LOCATION: SHOULDER

## 2021-11-19 ASSESSMENT — PAIN DESCRIPTION - PAIN TYPE: TYPE: CHRONIC PAIN

## 2021-11-19 ASSESSMENT — PAIN DESCRIPTION - ORIENTATION: ORIENTATION: RIGHT

## 2021-11-19 ASSESSMENT — PAIN SCALES - GENERAL
PAINLEVEL_OUTOF10: 0
PAINLEVEL_OUTOF10: 1
PAINLEVEL_OUTOF10: 0

## 2021-11-19 ASSESSMENT — PAIN DESCRIPTION - DESCRIPTORS: DESCRIPTORS: PATIENT UNABLE TO DESCRIBE

## 2021-11-19 NOTE — PROGRESS NOTES
[facility]  INPATIENT PHYSICAL THERAPY  DAILY NOTE  [facility] [address] - 6E-67/067-A    Time In: 1147  Time Out: 1210  Timed Code Treatment Minutes: 23 Minutes  Minutes: 23          Date: 2021  Patient Name: Carmelo Mallory,  Gender:  male        MRN: [id number]  : 1926  (89 y.o.)     Referring Practitioner: Alec Viera MD  Diagnosis: elevated troponin level  Additional Pertinent Hx: Per chart Kyaw Valencia is a 80 y.o. male with PMHx of prostate cancer status post TURP with history of UTIs, hypertension, hyperlipidemia, hypothyroidism, nephrolithiasis, and osteoarthritis who was brought into the emergency room here at [facility] by EMS on 11/10/2021 with complaints of about a 2-day history of altered mentation increased confusion decreased interaction and inability to care for himself like he normally did. At the time of evaluation and interview in the ER patient disoriented to time and unable to provide much information about why he is in the emergency room and what happened at home. He states that he is just unable to get it altogether for some reason. Per emergency staff notes, patient was brought in because his son realized that patient was not acting himself with increased confusion, decreased interaction and decreased energy, staying in the bathtub for 3 hours with no water despite being instructed by the son to call after he was done showering. Son also is unsure about whether patient has been taking his medications at home lately and whether he has been taking them correctly. It appears that patient has been living alone but failing to care for herself and needs higher level of care at this time. Denies any nausea vomiting abdominal pain or diarrhea. No fevers or chills or sore throat or shortness of breath. No chest pain. \"     Prior Level of Function:  Lives With: Alone  Type of Home: House  Home Layout: Two level, Bed/Bath upstairs  Home Access: Stairs to enter with rails  Entrance Stairs - Number of Steps: 2   Bathroom Shower/Tub: Tub/Shower unit  Bathroom Toilet: Standard  Bathroom Equipment: Grab bars in shower  Bathroom Accessibility: Accessible    Receives Help From: Family  ADL Assistance: Independent  Homemaking Assistance: Independent  Ambulation Assistance: Independent  Transfer Assistance: Independent  Active : Yes  Additional Comments: pt is a poor historian, unabel to get accurate or much information from pt due to AMS at this time. Hx taken from chart. reports he uses no AD prior to admit. Restrictions/Precautions:  Restrictions/Precautions: General Precautions, Fall Risk  Position Activity Restriction  Other position/activity restrictions: increased confusion/AMS     SUBJECTIVE: RN approved session. Pt resting in bedside chair, pleasantly confused at times. Agreeable to therapy. PAIN: 0/10: denies pain    Vitals: Vitals not assessed per clinical judgement, see nursing flowsheet    OBJECTIVE:  Bed Mobility:  Not Tested    Transfers:  Sit to Stand: [employer], Several transfers from bedside chair. Each transfer pt required cuing for hand placement. Little follow through from one transfer to the next. Stand to [facility] 68, cues for hand placement    Ambulation:  Contact Guard Assistance, Minimal Assistance  Distance: 65 feet x1   Surface: Level Tile  Device:Rolling Walker  Gait Deviations:  Cuing to slow down for decreased fall risk. Pt with several small LOB especially when turning with AD requiring Min A to correct. Pt demos decreased safety awareness throughout gait. Balance:  Dynamic Sitting Balance: Supervision  Dynamic Standing Balance: Minimal Assistance, Functional reaching activity requiring pt to reach outside KUNAL from above head to waist level. Pt required one UE on AD at all times for stability.  Pt then completed toe taps onto 6\" cup x10 reps with each LE then x10 reps with a term goal 4: NA  Long term goals  Time Frame for Long term goals : NA due to short ELOS    Following session, patient left in safe position with all fall risk precautions in place.

## 2021-11-19 NOTE — CARE COORDINATION
Discharge Planning Update:       Treating sepsis, UTI, Alzheimers. PT/OT following. Peer to Peer denies yesterday and appeal began. SW following.

## 2021-11-19 NOTE — CARE COORDINATION
11/19/21, 9:52 AM EST    DISCHARGE PLANNING EVALUATION    SW spoke with pts son, Fortunato Villalpando via phone. Updated Fortunato Villalpando on insurance process. Advised that an appeal had been filed yesterday and can take 72 hrs for response. Fortunato Villalpando wants to continue with the appeal process. SW advised that pt may stay throughout the weekend pending response from Insurance.

## 2021-11-19 NOTE — PROGRESS NOTES
[facility]  INPATIENT SPEECH THERAPY  [facility] 6E  DAILY NOTE    TIME   SLP Individual Minutes  Time In: 1104  Time Out: 8672  Minutes: 20  Timed Code Treatment Minutes: 10 Minutes     Cog tx: 10 minutes   Dysphagia tx: 10 minutes     Date: 2021  Patient Name: Ramón Van      CSN: [id number]   : 1926  (89 y.o.)  Gender: male   Diagnosis: Troponin level elevation   Secondary Diagnosis: dysphagia, cognitive impairment  Precautions: aspiration, fall risk   Current Diet: Regular and Moderately thick liquids  Swallowing Strategies: Full Upright Position, Small Bite/Sip, Multiple Swallow and Pulmonary Monitoring    Date of Last MBS/FEES: 11/15/2021    Pain:  No pain reported. Subjective:  ANNIE Street approved ST session on this date. Patient sitting upright in armchair upon ST arrival. Pleasant; albeit confused and disoriented. Upon arrival to room, patient with 8 oz cup of thickened water with ice chips that did not appear to moderately thick criteria. ST completed review with ANNIE Street following session regarding how to thicken liquids and exclude ice from thickened liquids to prevent \"thining\" liquid. Verbal receptiveness noted. Short-Term Goals:  SHORT TERM GOAL #1:  Goal 1: Patient will consume a regular diet with moderately thickened liquids without overt s/s of penetration/aspiration to maintain adequate nutrition/hydration measures  INTERVENTIONS: ST completed po trials of moderately thick liquids and regular texture (saltine cracker) this date. Patient demonstrated evidence of adequate labial seal, suspected adequate bolus control and manipulation, suspected delayed swallow initiation d/t presence of audible swallow with all trials of moderately thick liquids, suspected pharyngeal residue d/t presence of double swallow, and no overt s/s of aspiration.  Of course pharyngeal dysfunction and totality of airway invasion events cannot be formally assessed at bedside given mod cues to improve mental flexibility and participation in ADL tasks. INTERVENTIONS:   Divergent Naming   Trial 1 (concrete): 5 independent 4 min cues 3 give cues   Trial 2 (abstract): 4 independent, 2 given mod cues   *slow processing speed and decreased thought organization  *improved success with categorical cues. Long-Term Goals:  No long term goals recommended d/t short ELOS         EDUCATION:  Learner: Patient  Education:  Reviewed results and recommendations of this evaluation, Reviewed diet and strategies, Reviewed signs, symptoms and risks of aspiration, Education Related to Potential Risks and Complications Due to Impairment/Illness/Injury, Education Related to [employer] and Wellness and Home Safety Education  Evaluation of Education: Verbalizes understanding, Demonstrates with assistance, Needs further instruction, No evidence of learning and Family not present    ASSESSMENT/PLAN:  Activity Tolerance:  Patient tolerance of  treatment: good. Pleasant and cooperative     Assessment/Plan: Patient progressing toward established goals. Continues to require skilled care of licensed speech pathologist to progress toward achievement of established goals and plan of care. .     Plan for Next Session: PO trials and cog tx    [facility]) Adrianna Ponce M.A., [address]

## 2021-11-20 PROCEDURE — 6370000000 HC RX 637 (ALT 250 FOR IP): Performed by: INTERNAL MEDICINE

## 2021-11-20 PROCEDURE — 1200000000 HC SEMI PRIVATE

## 2021-11-20 PROCEDURE — 6370000000 HC RX 637 (ALT 250 FOR IP): Performed by: STUDENT IN AN ORGANIZED HEALTH CARE EDUCATION/TRAINING PROGRAM

## 2021-11-20 PROCEDURE — 6360000002 HC RX W HCPCS: Performed by: PEDIATRICS

## 2021-11-20 PROCEDURE — 96372 THER/PROPH/DIAG INJ SC/IM: CPT

## 2021-11-20 PROCEDURE — 99232 SBSQ HOSP IP/OBS MODERATE 35: CPT | Performed by: INTERNAL MEDICINE

## 2021-11-20 RX ADMIN — LEVOTHYROXINE SODIUM 100 MCG: 0.1 TABLET ORAL at 06:06

## 2021-11-20 RX ADMIN — AMLODIPINE BESYLATE 10 MG: 10 TABLET ORAL at 08:39

## 2021-11-20 RX ADMIN — Medication 1000 MCG: at 08:40

## 2021-11-20 RX ADMIN — ENOXAPARIN SODIUM 40 MG: 100 INJECTION SUBCUTANEOUS at 12:06

## 2021-11-20 RX ADMIN — MICONAZOLE NITRATE: 20 POWDER TOPICAL at 19:13

## 2021-11-20 RX ADMIN — MICONAZOLE NITRATE: 20 POWDER TOPICAL at 08:38

## 2021-11-20 ASSESSMENT — PAIN SCALES - GENERAL
PAINLEVEL_OUTOF10: 0

## 2021-11-20 NOTE — PROGRESS NOTES
Hospitalist Progress Note    Patient:  Stefanie Jimenez    YOB: 1926  Unit/Bed:6E-67/067-A  MRN: [id number]    Acct: [de-identified]   PCP: OLGA Del Real CNP    Date of Admission: 11/10/2021      Assessment/Plan:      #Acute metabolic encephalopathy in setting of Dementia (progressive) likely 2/2 dehydration and possibly transient hypoxia. Per prior notes, \"More information from the son Esther Deras appears to show that pt was found in bath tub, with no water left in it, a stand alone heater was blaring and had the temperature of the bathroom to a staggering degree\". -also component of uncontrolled hypothyroidism (poor compliance) and B12 deficiency   -see below for management of this  -delirium precautions  -consider for TIRCIA screening  -CT head with mod-severe volume loss -> c/w diagnosis of dementia (possible vascular component with atherosclerosis)  -MOCA assessment, pt scored 11/30.   -resolved and seems to be closer to baseline  -patient not safe for home alone, PT/OT on board recommend SNF      #Stage 2 VADIM on CKD IIIb (resolved): Cr 2.1 on baseline of 1.6. GFR 29  - Avoid nephrotoxic agents and renally dose medications  - repeat bmp on 11/18 stable    #Asymptomatic bacteruria in setting of Hx of Chronic UTI/Incontinence: Likely colonized bacteria. No fevers or leukocytosis. Multiple previous cultures show E faecalis, Strep agalactiae, Staph aureus, and Staph epi. Received ceftriaxone on admission.   - D/c Abx as it does not appear to be a UTI at this time   - No fevers or other symptoms at this time     #Hypothyroidism, uncontrolled: non-adherence to medication. .8, Free T4 0.56. Per pts daughter the patient has had iatrogenic destruction of the thyroid, but uncertain of exact details. This was also unconfirmed via chart review.   - Resume levothyroxine at 50mcg, increased to 100mcg on 11/18 as FT4 still 0.5  - recommend repeat TSH and t4 outpatient in 6-8 weeks    #B12 deficiency: in low 200s, starting on oral replacement, may be contributing to symptoms.        #HTN : Controlled with some lability.  - Continue amlodipine with holding parameters     #Hx of prostate cancer: s/p radiation in 2000.     #Hx of bladder stones: Noted. Follows with Dr. Garcia (urology) outpatient.     #Code status: Patient's daughter and son are POA. A long conversation with the son was had along with palliative and the decision has been made to change pt to Limited No x4.          Expected discharge date:  pending    Disposition: SNF, precert denied, appeal is in process. [] Home  [] TCU  [] Rehab  [] Psych  [] SNF  [] [facility]  [] Other-    ===================================================================      Chief Complaint: ams    Subjective (past 24 hours): Patient seen at bedside. Pleasantly confused. Denies any complaints. Able to state his name and year today but not location (thinks at [facility]). Denies SOB, or discomfort. Medications:  Reviewed    Infusion Medications    sodium chloride       Scheduled Medications    levothyroxine  100 mcg Oral Daily    vitamin B-12  1,000 mcg Oral Daily    enoxaparin  40 mg SubCUTAneous Daily    sterile water  1.2 mL IntraMUSCular Once    amLODIPine  10 mg Oral Daily    sodium chloride flush  10 mL IntraVENous 2 times per day    [Held by provider] aspirin  81 mg Oral Daily    miconazole   Topical BID     PRN Meds: potassium chloride **OR** potassium alternative oral replacement **OR** potassium chloride, melatonin, ziprasidone **AND** sterile water, sodium chloride flush, sodium chloride, ondansetron **OR** ondansetron, polyethylene glycol, acetaminophen **OR** acetaminophen, aluminum & magnesium hydroxide-simethicone      ROS: reviewed from prior note, full ROS unchanged unless otherwise stated in hospital course/subjective portion.          Intake/Output Summary (Last 24 hours) at 11/19/2021 1910  Last data filed at 11/19/2021 1802  Gross per 24 hour   Intake 820 ml   Output --   Net 820 ml       Exam:  BP (!) 145/71   Pulse 74   Temp 97.8 °F (36.6 °C) (Oral)   Resp 16   Ht 6' (1.829 m)   Wt 184 lb 9.6 oz (83.7 kg)   SpO2 97%   BMI 25.04 kg/m²     General appearance: Pleasantly confused, no distress  Eyes:  PERRL. Conjunctivae/corneas clear. HENT: Head normal appearing. Nares normal. Oral mucosa moist.  Hearing intact. Neck: Supple, with full range of motion. Trachea midline. No gross JVD appreciated. Respiratory:  Normal effort. Clear to auscultation, without wheezes or rhonchi. Trace crackles in bases. Cardiovascular: Normal rate, regular rhythm with normal S1/S2 without murmurs. Trace lower extremity edema bilaterally. Abdomen: Soft, non-tender, non-distended with normal bowel sounds. Protuberant  Musculoskeletal: No joint swelling or tenderness. Normal tone. No abnormal movements. Skin: Warm and dry. No rashes or lesions. Neurologic:  No focal sensory/motor deficits in the upper or lower extremities. Cranial nerves:  grossly non-focal 2-12. Psychiatric: Alert and oriented to self only, impaired insight  Capillary Refill: Brisk,< 3 seconds. Peripheral Pulses: +2 palpable, equal bilaterally. Labs:   No results for input(s): WBC, HGB, HCT, PLT in the last 72 hours. Recent Labs     11/18/21  0759      K 3.8      CO2 25   BUN 40*   CREATININE 1.4*   CALCIUM 9.1     No results for input(s): AST, ALT, BILIDIR, BILITOT, ALKPHOS in the last 72 hours. No results for input(s): INR in the last 72 hours. No results for input(s): Becca Risk in the last 72 hours. No results for input(s): PROCAL in the last 72 hours.    Lab Results   Component Value Date    NITRU NEGATIVE 11/10/2021    WBCUA OBSCURED 11/10/2021    BACTERIA FEW 11/10/2021    RBCUA OBSCURED 11/10/2021    BLOODU LARGE 11/10/2021    SPECGRAV >=1.030 01/13/2020    SPECGRAV 1.013 01/14/2017    GLUCOSEU NEGATIVE 11/10/2021 Radiology (48 hours):  No results found. DVT prophylaxis:    [x] Lovenox  [] SCDs  [] SQ Heparin  [] Encourage ambulation   [] Already on Anticoagulation       Diet: ADULT DIET; Regular;  Moderately Thick (Honey)  Code Status: Limited  PT/OT: yes  Tele: no  IVF: no    Electronically signed by Romelia Awad DO on 11/19/2021 at 7:10 PM

## 2021-11-20 NOTE — PROGRESS NOTES
Hospitalist Progress Note    Patient:  Allyson Garcia    YOB: 1926  Unit/Bed:6E-67/067-A  MRN: [id number]    Acct: [de-identified]   PCP: Tera Holter, APRN - CNP    Date of Admission: 11/10/2021      Assessment/Plan:      #Acute metabolic encephalopathy (resolved) in setting of Dementia (progressive) likely 2/2 dehydration and possibly transient hypoxia. Per prior notes, \"More information from the son Miriam Ross appears to show that pt was found in bath tub, with no water left in it, a stand alone heater was blaring and had the temperature of the bathroom to a staggering degree\". -also component of uncontrolled hypothyroidism (poor compliance) and B12 deficiency   -see below for management of this  -delirium precautions  -consider for TRICIA screening  -CT head with mod-severe volume loss -> c/w diagnosis of dementia (possible vascular component with atherosclerosis)  -MOCA assessment, pt scored 11/30.   -resolved and seems to be closer to baseline  -patient not safe for home alone, PT/OT on board recommend SNF. Son is in agreement with plan.       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  - Avoid nephrotoxic agents and renally dose medications  - repeat bmp on 11/18 stable at 1.4  - f/up with Family Physician with repeat BMP     #Asymptomatic bacteruria in setting of Hx of Chronic UTI/Incontinence: Likely colonized bacteria. No fevers or leukocytosis. Multiple previous cultures show E faecalis, Strep agalactiae, Staph aureus, and Staph epi. Received ceftriaxone on admission.   - D/c Abx as it does not appear to be a UTI at this time   - No fevers or other symptoms at this time - will monitor.      #Hypothyroidism, uncontrolled: non-adherence to medication. .8, Free T4 0.56. Per pts daughter the patient has had iatrogenic destruction of the thyroid, but uncertain of exact details. This was also unconfirmed via chart review.   - Resume levothyroxine at 50mcg, increased to 100mcg on 11/18 as FT4 still 0.5  - Will repeat on Monday if patient is still in house. - recommend repeat TSH and t4 outpatient in 6-8 weeks    #B12 deficiency: in low 200s, starting on oral replacement, may be contributing to symptoms. #Mild Fluid Overload: suspect related to renal disease. No echo seen in chart. Hold off diuresis at this time as satting well on room air, and also given his prior VADIM. Not on any at home. Further workup can be pursued outpatient.      #HTN : Controlled with some lability.  - Continue amlodipine with holding parameters     #Hx of prostate cancer: s/p radiation in 2000.     #Hx of bladder stones: Noted. Follows with Dr. Garcia (urology) outpatient.     #Code status: Patient's daughter and son are POA. A long conversation with the son was had along with palliative and the decision has been made to change pt to Limited No x4.          Expected discharge date:  pending    Disposition: SNF, precert denied, appeal is in process. [] Home  [] TCU  [] Rehab  [] Psych  [] SNF  [] [place]  [] Other-    ===================================================================      Chief Complaint: ams    Subjective (past 24 hours): Patient seen at bedside. He is sitting in chair. Pleasantly confused. Denies any complaints. Denies SOB, or abdominal pain, constipation or diarrhea. Discussed with RN [facility].          Medications:  Reviewed    Infusion Medications    sodium chloride       Scheduled Medications    levothyroxine  100 mcg Oral Daily    vitamin B-12  1,000 mcg Oral Daily    enoxaparin  40 mg SubCUTAneous Daily    sterile water  1.2 mL IntraMUSCular Once    amLODIPine  10 mg Oral Daily    sodium chloride flush  10 mL IntraVENous 2 times per day    [Held by provider] aspirin  81 mg Oral Daily    miconazole   Topical BID     PRN Meds: potassium chloride **OR** potassium alternative oral replacement **OR** potassium chloride, melatonin, ziprasidone **AND** sterile water, sodium chloride flush, sodium chloride, ondansetron **OR** ondansetron, polyethylene glycol, acetaminophen **OR** acetaminophen, aluminum & magnesium hydroxide-simethicone      ROS: reviewed from prior note, full ROS unchanged unless otherwise stated in hospital course/subjective portion. Intake/Output Summary (Last 24 hours) at 11/20/2021 1142  Last data filed at 11/20/2021 0948  Gross per 24 hour   Intake 960 ml   Output --   Net 960 ml       Exam:  BP (!) 118/59   Pulse 66   Temp 97.7 °F (36.5 °C) (Oral)   Resp 16   Ht 6' (1.829 m)   Wt 184 lb 9.6 oz (83.7 kg)   SpO2 97%   BMI 25.04 kg/m²     General appearance: Pleasantly confused, no distress  Eyes:  PERRL. Conjunctivae/corneas clear. HENT: Head normal appearing. Nares normal. Oral mucosa moist.  Hearing intact. Neck: Supple, with full range of motion. Trachea midline. No gross JVD appreciated. Respiratory:  Normal effort. Clear to auscultation, without wheezes or rhonchi. Trace crackles in bases. Cardiovascular: Normal rate, regular rhythm with normal S1/S2 without murmurs. Trace lower extremity edema bilaterally. Abdomen: Soft, non-tender, non-distended with normal bowel sounds. Protuberant  Musculoskeletal: No joint swelling or tenderness. Normal tone. No abnormal movements. Skin: Warm and dry. No rashes or lesions. Neurologic:  No focal sensory/motor deficits in the upper or lower extremities. Cranial nerves:  grossly non-focal 2-12. Psychiatric: Alert and oriented to self only, impaired insight  Capillary Refill: Brisk,< 3 seconds. Peripheral Pulses: +2 palpable, equal bilaterally. Labs:   No results for input(s): WBC, HGB, HCT, PLT in the last 72 hours. Recent Labs     11/18/21  0759      K 3.8      CO2 25   BUN 40*   CREATININE 1.4*   CALCIUM 9.1     No results for input(s): AST, ALT, BILIDIR, BILITOT, ALKPHOS in the last 72 hours.   No results for input(s): INR in the last 72 hours. No results for input(s): Gisella Sky in the last 72 hours. No results for input(s): PROCAL in the last 72 hours. Lab Results   Component Value Date    NITRU NEGATIVE 11/10/2021    WBCUA OBSCURED 11/10/2021    BACTERIA FEW 11/10/2021    RBCUA OBSCURED 11/10/2021    BLOODU LARGE 11/10/2021    SPECGRAV >=1.030 01/13/2020    SPECGRAV 1.013 01/14/2017    GLUCOSEU NEGATIVE 11/10/2021       Radiology (48 hours):  No results found. DVT prophylaxis:    [x] Lovenox  [] SCDs  [] SQ Heparin  [] Encourage ambulation   [] Already on Anticoagulation       Diet: ADULT DIET; Regular;  Moderately Thick (Honey)  Code Status: Limited  PT/OT: yes  Tele: no  IVF: no    Electronically signed by Luda Swain DO on 11/20/2021 at 11:42 AM

## 2021-11-21 PROCEDURE — 1200000000 HC SEMI PRIVATE

## 2021-11-21 PROCEDURE — 99232 SBSQ HOSP IP/OBS MODERATE 35: CPT | Performed by: INTERNAL MEDICINE

## 2021-11-21 PROCEDURE — 6370000000 HC RX 637 (ALT 250 FOR IP): Performed by: INTERNAL MEDICINE

## 2021-11-21 PROCEDURE — 6360000002 HC RX W HCPCS: Performed by: PEDIATRICS

## 2021-11-21 PROCEDURE — 6370000000 HC RX 637 (ALT 250 FOR IP): Performed by: STUDENT IN AN ORGANIZED HEALTH CARE EDUCATION/TRAINING PROGRAM

## 2021-11-21 PROCEDURE — 96372 THER/PROPH/DIAG INJ SC/IM: CPT

## 2021-11-21 RX ADMIN — MICONAZOLE NITRATE: 20 POWDER TOPICAL at 23:03

## 2021-11-21 RX ADMIN — MICONAZOLE NITRATE: 20 POWDER TOPICAL at 08:34

## 2021-11-21 RX ADMIN — ENOXAPARIN SODIUM 40 MG: 100 INJECTION SUBCUTANEOUS at 11:25

## 2021-11-21 RX ADMIN — AMLODIPINE BESYLATE 10 MG: 10 TABLET ORAL at 08:33

## 2021-11-21 RX ADMIN — LEVOTHYROXINE SODIUM 100 MCG: 0.1 TABLET ORAL at 05:50

## 2021-11-21 RX ADMIN — Medication 1000 MCG: at 08:33

## 2021-11-21 ASSESSMENT — PAIN SCALES - GENERAL
PAINLEVEL_OUTOF10: 0

## 2021-11-21 NOTE — PROGRESS NOTES
Hospitalist Progress Note    Patient:  Adrian Miranda    YOB: 1926  Unit/Bed:6E-67/067-A  MRN: [id number]    Acct: [de-identified]   PCP: OLGA Feliz CNP    Date of Admission: 11/10/2021      Assessment/Plan:      #Acute metabolic encephalopathy (resolved) in setting of Dementia (progressive) likely 2/2 dehydration and possibly transient hypoxia. Per prior notes, \"More information from the son Geovanny Maki appears to show that pt was found in bath tub, with no water left in it, a stand alone heater was blaring and had the temperature of the bathroom to a staggering degree\". -also component of uncontrolled hypothyroidism (poor compliance) and B12 deficiency   -see below for management of this  -delirium precautions  -consider for TRICIA screening  -CT head with mod-severe volume loss -> c/w diagnosis of dementia (possible vascular component with atherosclerosis)   -also consider hypothyroidism and b12 deficiency contributing.   -MOCA assessment, pt scored 11/30.   -resolved and seems to be closer to baseline  -patient not safe for home alone, PT/OT on board recommend SNF. Son is in agreement with plan.       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  - Avoid nephrotoxic agents and renally dose medications  - repeat bmp on 11/18 stable at 1.4  - f/up with Family Physician with repeat BMP     #Asymptomatic bacteruria in setting of Hx of Chronic UTI/Incontinence: Likely colonized bacteria. No fevers or leukocytosis. Multiple previous cultures show E faecalis, Strep agalactiae, Staph aureus, and Staph epi. Received ceftriaxone on admission.   - D/c Abx as it does not appear to be a UTI at this time   - No fevers or other symptoms at this time - will monitor.      #Hypothyroidism, uncontrolled: non-adherence to medication. .8, Free T4 0.56. Per pts daughter the patient has had iatrogenic destruction of the thyroid, but uncertain of exact details.  This was also unconfirmed via chart review. - Resume levothyroxine at 50mcg, increased to 100mcg on 11/18 as FT4 still 0.5  - Will repeat on Monday. - recommend repeat TSH and t4 outpatient in 6-8 weeks    #B12 deficiency: in low 200s, starting on oral replacement, may be contributing to symptoms. #Mild Fluid Overload: suspect related to renal disease. No echo seen in chart. Hold off diuresis at this time as satting well on room air, and also given his prior VADIM. Not on any at home. Further workup can be pursued outpatient.      #HTN : Controlled with some lability.  - Continue amlodipine with holding parameters     #Hx of prostate cancer: s/p radiation in 2000.     #Hx of bladder stones: Noted. Follows with Dr. Garcia (urology) outpatient.     #Code status: Patient's daughter and son are POA. A long conversation with the son was had along with palliative and the decision has been made to change pt to Limited No x4.          Expected discharge date:  pending    Disposition: SNF, precert denied, appeal is in process. [] Home  [] TCU  [] Rehab  [] Psych  [] SNF  [] [facility]  [] Other-    ===================================================================      Chief Complaint: ams    Subjective (past 24 hours): Patient seen at bedside. Laying in bed. No complaints. Confused as per baseline.        Medications:  Reviewed    Infusion Medications    sodium chloride       Scheduled Medications    levothyroxine  100 mcg Oral Daily    vitamin B-12  1,000 mcg Oral Daily    enoxaparin  40 mg SubCUTAneous Daily    sterile water  1.2 mL IntraMUSCular Once    amLODIPine  10 mg Oral Daily    sodium chloride flush  10 mL IntraVENous 2 times per day    [Held by provider] aspirin  81 mg Oral Daily    miconazole   Topical BID     PRN Meds: potassium chloride **OR** potassium alternative oral replacement **OR** potassium chloride, melatonin, ziprasidone **AND** sterile water, sodium chloride flush, sodium TROPONINI in the last 72 hours. No results for input(s): PROCAL in the last 72 hours. Lab Results   Component Value Date    NITRU NEGATIVE 11/10/2021    WBCUA OBSCURED 11/10/2021    BACTERIA FEW 11/10/2021    RBCUA OBSCURED 11/10/2021    BLOODU LARGE 11/10/2021    SPECGRAV >=1.030 01/13/2020    SPECGRAV 1.013 01/14/2017    GLUCOSEU NEGATIVE 11/10/2021       Radiology (48 hours):  No results found. DVT prophylaxis:    [x] Lovenox  [] SCDs  [] SQ Heparin  [] Encourage ambulation   [] Already on Anticoagulation       Diet: ADULT DIET; Regular;  Moderately Thick (Honey)  Code Status: Limited  PT/OT: yes  Tele: no  IVF: no    Electronically signed by Nicky Duvall DO on 11/21/2021 at 3:44 PM

## 2021-11-22 LAB — T4 FREE: 0.68 NG/DL (ref 0.93–1.76)

## 2021-11-22 PROCEDURE — 6370000000 HC RX 637 (ALT 250 FOR IP): Performed by: INTERNAL MEDICINE

## 2021-11-22 PROCEDURE — 6360000002 HC RX W HCPCS: Performed by: PEDIATRICS

## 2021-11-22 PROCEDURE — 6370000000 HC RX 637 (ALT 250 FOR IP): Performed by: STUDENT IN AN ORGANIZED HEALTH CARE EDUCATION/TRAINING PROGRAM

## 2021-11-22 PROCEDURE — 92526 ORAL FUNCTION THERAPY: CPT

## 2021-11-22 PROCEDURE — 99232 SBSQ HOSP IP/OBS MODERATE 35: CPT | Performed by: PHYSICIAN ASSISTANT

## 2021-11-22 PROCEDURE — 97129 THER IVNTJ 1ST 15 MIN: CPT

## 2021-11-22 PROCEDURE — 36415 COLL VENOUS BLD VENIPUNCTURE: CPT

## 2021-11-22 PROCEDURE — 84439 ASSAY OF FREE THYROXINE: CPT

## 2021-11-22 PROCEDURE — 96372 THER/PROPH/DIAG INJ SC/IM: CPT

## 2021-11-22 PROCEDURE — 1200000000 HC SEMI PRIVATE

## 2021-11-22 RX ORDER — LEVOTHYROXINE SODIUM 0.12 MG/1
125 TABLET ORAL DAILY
Status: DISCONTINUED | OUTPATIENT
Start: 2021-11-23 | End: 2021-11-28 | Stop reason: HOSPADM

## 2021-11-22 RX ADMIN — MICONAZOLE NITRATE: 20 POWDER TOPICAL at 19:46

## 2021-11-22 RX ADMIN — AMLODIPINE BESYLATE 10 MG: 10 TABLET ORAL at 09:11

## 2021-11-22 RX ADMIN — LEVOTHYROXINE SODIUM 100 MCG: 0.1 TABLET ORAL at 09:11

## 2021-11-22 RX ADMIN — MICONAZOLE NITRATE: 20 POWDER TOPICAL at 09:18

## 2021-11-22 RX ADMIN — Medication 1000 MCG: at 09:11

## 2021-11-22 RX ADMIN — ENOXAPARIN SODIUM 40 MG: 100 INJECTION SUBCUTANEOUS at 13:18

## 2021-11-22 ASSESSMENT — PAIN SCALES - GENERAL
PAINLEVEL_OUTOF10: 0

## 2021-11-22 NOTE — CARE COORDINATION
11/22/21, 12:44 PM EST    DISCHARGE PLANNING EVALUATION      Spoke with [facility] admissions. Appeal has been started, and [facility] will accept if approved.

## 2021-11-22 NOTE — PROGRESS NOTES
[facility]  INPATIENT SPEECH THERAPY  [facility] 6E  DAILY NOTE    TIME   SLP Individual Minutes  Time In: 4943  Time Out: 0078  Minutes: 16  Timed Code Treatment Minutes: 8 Minutes     Cog tx: 8 minutes   Dysphagia tx: 8 minutes     Date: 2021  Patient Name: Adrian Miranda      CSN: [id number]   : 1926  (89 y.o.)  Gender: male   Diagnosis: Troponin level elevation   Secondary Diagnosis: dysphagia, cognitive impairment  Precautions: aspiration, fall risk   Current Diet: Regular and Moderately thick liquids  Swallowing Strategies: Full Upright Position, Small Bite/Sip, Multiple Swallow and Pulmonary Monitoring    Date of Last MBS/FEES: 11/15/2021    Pain:  No pain reported. Subjective:  RN Rebecca Cleaning approved ST session on this date. RN reports family brought patient coffee over the weekend and they did not thicken it resulting in aspiration event. ST encouraged RN to contact ST to provide further education to family as indicated re: patient's current diet level. Patient sitting upright in armchair and agreeable to ST session. Patient pleasantly confused, but cooperative. Short-Term Goals:  SHORT TERM GOAL #1:  Goal 1: Patient will consume a regular diet with moderately thickened liquids without overt s/s of penetration/aspiration to maintain adequate nutrition/hydration measures  INTERVENTIONS: Skilled dietary analysis completed with regular breakfast tray containing omelette, muffin and chicken sausage with moderately thickened milk and apple juice. PO intake of regular solids revealed adequate oral initiation, slightly prolonged mastication, suspected adequate bolus formation/coordination, suspected timely AP transit and mildly delayed swallow initiation. Patient HIGHLY impulsive on this date as evidenced by engaging in multiple bites per swallow requiring verbal prompts from ST to engage in x1 bite per swallow.  Limited functional carryover throughout session, requiring regular prompting from ST. PO intake of moderately thickened liquids present with adequate oral initiation, suspected adequate bolus control/manipulation, suspected timely AP transit, and suspected delayed swallow initiation. Patient presents with audible swallow across all trials of moderately thickened liquids. Unable to fully assess pharyngeal phase of swallow at bedside; however, formal instrumental assessment not warranted at this time. ST recommends patient continue regular diet with moderately thickened liquids. ST strongly recommends intermittent supervision with meals d/t high levels of impulsivity and decreased safety awareness. SHORT TERM GOAL #2:  Goal 2: Patient will complete advanced PO trials of liquids without s/s of penetration/aspiration while monitoring pulmonary status to determine potential readiness for dietary upgrade. INTERVENTIONS: Did not address this session d/t focus on other goals. SHORT TERM GOAL #3:  Goal 3: Patient will complete basic orientation and STM of 3 units of information with 60% accuracy given mod cues to improve awareness and retention of information in immediate environment. INTERVENTIONS:  Orientation  Place -  Independent (hospital)  Year- max cues with presence of calendar  Month - max cues with presence of daily calendar  Upcoming Holiday - indep   Birth date: independent  Children: max cues      SHORT TERM GOAL #4:  Goal 4: Patient will complete basic problem solving and reasoning tasks with 60% accuracy given mod cues to improve safety within current and home environment. INTERVENTIONS:   Call Light  -Patient required total assist to locate call light remote. Mod assist required for patient to locate red call light button for RN. Limited functional recall of call light use demonstrated on this date. Identifying need for RN  2/3 indep, 1/3 max cues  *patient able to recognize need for RN \"if I choke or can't breathe\".  Unable to recognize need for RN re: assistance transporting with patient stating, \"I'll just use this wheely cart to get around (tray table)\". Education provided re: current level of support required for transporting self. Suspect limited functional carryover   -ST recommends intermittent supervision for duration of hospital stay due to increased impulsivity and decreased safety awareness  - ST STRONGLY recommends intermittent supervision during hospitalization and 24/7 supervision upon discharge. SHORT TERM GOAL #5:  Goal 5: Patient will complete thought organizaion and sequencing tasks with 60% accuracy given mod cues to improve mental flexibility and participation in ADL tasks. INTERVENTIONS:   DNT due to focus on other goals     Long-Term Goals:  No long term goals recommended d/t short ELOS         EDUCATION:  Learner: Patient  Education:  Reviewed results and recommendations of this evaluation, Reviewed diet and strategies, Reviewed signs, symptoms and risks of aspiration, Education Related to Potential Risks and Complications Due to Impairment/Illness/Injury, Education Related to [employer] and Wellness and Home Safety Education  Evaluation of Education: Verbalizes understanding, Demonstrates with assistance, Needs further instruction, No evidence of learning and Family not present    ASSESSMENT/PLAN:  Activity Tolerance:  Patient tolerance of  treatment: good. Pleasant and cooperative     Assessment/Plan: Patient progressing toward established goals. Continues to require skilled care of licensed speech pathologist to progress toward achievement of established goals and plan of care. .     Plan for Next Session: PO trials and cog tx    Valeria Pollard, [address]

## 2021-11-22 NOTE — PROGRESS NOTES
Hospitalist Progress Note    Patient:  Gamaliel Carreno    YOB: 1926  Unit/Bed:6E-67/067-A  MRN: [id number]    Acct: [de-identified]   PCP: OLGA Gaines CNP    Date of Admission: 11/10/2021      Assessment/Plan:      #Acute metabolic encephalopathy (resolved) in setting of Dementia (progressive) likely 2/2 dehydration and possibly transient hypoxia. Per prior notes, \"More information from the son Rosetta Pike appears to show that pt was found in bath tub, with no water left in it, a stand alone heater was blaring and had the temperature of the bathroom to a staggering degree\". -also component of uncontrolled hypothyroidism (poor compliance) and B12 deficiency   -see below for management of this  -delirium precautions  -consider for TRICIA screening  -CT head with mod-severe volume loss -> c/w diagnosis of dementia (possible vascular component with atherosclerosis)   -also consider hypothyroidism and b12 deficiency contributing.   -MOCA assessment, pt scored 11/30.   -resolved and seems to be closer to baseline  -patient not safe for home alone, PT/OT on board recommend SNF. Son is in agreement with plan.       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  - Avoid nephrotoxic agents and renally dose medications  - repeat bmp on 11/18 stable at 1.4  - f/up with Family Physician with repeat BMP     #Asymptomatic bacteruria in setting of Hx of Chronic UTI/Incontinence: Likely colonized bacteria. No fevers or leukocytosis. Multiple previous cultures show E faecalis, Strep agalactiae, Staph aureus, and Staph epi. Received ceftriaxone on admission.   - D/c Abx as it does not appear to be a UTI at this time   - No fevers or other symptoms at this time - will monitor.      #Hypothyroidism, uncontrolled: non-adherence to medication. .8, Free T4 0.56. Per pts daughter the patient has had iatrogenic destruction of the thyroid, but uncertain of exact details.  This was also unconfirmed via chart review. - Resume levothyroxine at 50mcg, increased to 100mcg on 11/18 as FT4 still 0.5  - 11/22: FT4 0.68. Will increase Synthroid to 125mcg. Repeat in 5 days. - recommend repeat TSH and t4 outpatient in 6-8 weeks    #B12 deficiency: in low 200s, starting on oral replacement, may be contributing to symptoms. #Mild Fluid Overload: suspect related to renal disease. No echo seen in chart. Hold off diuresis at this time as satting well on room air, and also given his prior VADIM. Not on any at home. Further workup can be pursued outpatient.      #HTN : Controlled with some lability.  - Continue amlodipine with holding parameters     #Hx of prostate cancer: s/p radiation in 2000.     #Hx of bladder stones: Noted. Follows with Dr. Garcia (urology) outpatient.     #Code status: Patient's daughter and son are POA. A long conversation with the son was had along with palliative and the decision has been made to change pt to Limited No x4.          Expected discharge date:  pending    Disposition: SNF, precert denied, appeal is in process. [] Home  [] TCU  [] Rehab  [] Psych  [x] SNF  [] [facility]  [] Other-    ===================================================================      Chief Complaint: ams    80year old male who presented to [facility] with AMS and inability to care for self at home. Subjective (past 24 hours): I took over care today. Patient is stable for discharge and awaiting placement at this time. He is oriented to self, place, somewhat to situation. Denies acute complaints.      Medications:  Reviewed    Infusion Medications    sodium chloride       Scheduled Medications    levothyroxine  100 mcg Oral Daily    vitamin B-12  1,000 mcg Oral Daily    enoxaparin  40 mg SubCUTAneous Daily    sterile water  1.2 mL IntraMUSCular Once    amLODIPine  10 mg Oral Daily    sodium chloride flush  10 mL IntraVENous 2 times per day    [Held by provider] aspirin  81 mg Oral Daily    miconazole   Topical BID     PRN Meds: potassium chloride **OR** potassium alternative oral replacement **OR** potassium chloride, melatonin, ziprasidone **AND** sterile water, sodium chloride flush, sodium chloride, ondansetron **OR** ondansetron, polyethylene glycol, acetaminophen **OR** acetaminophen, aluminum & magnesium hydroxide-simethicone      ROS: reviewed from prior note, full ROS unchanged unless otherwise stated in hospital course/subjective portion. Intake/Output Summary (Last 24 hours) at 11/22/2021 0839  Last data filed at 11/22/2021 7953  Gross per 24 hour   Intake 960 ml   Output 600 ml   Net 360 ml       Exam:  /72   Pulse 76   Temp 98.2 °F (36.8 °C) (Oral)   Resp 16   Ht 6' (1.829 m)   Wt 184 lb 9.6 oz (83.7 kg)   SpO2 96%   BMI 25.04 kg/m²     General appearance: Pleasantly confused, no distress  Eyes:  PERRL. Conjunctivae/corneas clear. HENT: Head normal appearing. Nares normal. Oral mucosa moist.  Hearing intact. Neck: Supple, with full range of motion. Trachea midline. No gross JVD appreciated. Respiratory:  Normal effort. Clear to auscultation, without wheezes or rhonchi. Trace crackles in bases. Cardiovascular: Normal rate, regular rhythm with normal S1/S2 without murmurs. Trace lower extremity edema bilaterally. Abdomen: Soft, non-tender, non-distended with normal bowel sounds. Protuberant  Musculoskeletal: No joint swelling or tenderness. Normal tone. No abnormal movements. Skin: Warm and dry. No rashes or lesions. Neurologic:  No focal sensory/motor deficits in the upper or lower extremities. Cranial nerves:  grossly non-focal 2-12. Psychiatric: Alert and oriented to self only, impaired insight  Capillary Refill: Brisk,< 3 seconds. Peripheral Pulses: +2 palpable, equal bilaterally. Labs:   No results for input(s): WBC, HGB, HCT, PLT in the last 72 hours.   No results for input(s): NA, K, CL, CO2, BUN, CREATININE, CALCIUM, PHOS in the last 72 hours. Invalid input(s): MAGNES  No results for input(s): AST, ALT, BILIDIR, BILITOT, ALKPHOS in the last 72 hours. No results for input(s): INR in the last 72 hours. No results for input(s): Leah Kras in the last 72 hours. No results for input(s): PROCAL in the last 72 hours. Lab Results   Component Value Date    NITRU NEGATIVE 11/10/2021    WBCUA OBSCURED 11/10/2021    BACTERIA FEW 11/10/2021    RBCUA OBSCURED 11/10/2021    BLOODU LARGE 11/10/2021    SPECGRAV >=1.030 01/13/2020    SPECGRAV 1.013 01/14/2017    GLUCOSEU NEGATIVE 11/10/2021       Radiology (48 hours):  No results found. DVT prophylaxis:    [x] Lovenox  [] SCDs  [] SQ Heparin  [] Encourage ambulation   [] Already on Anticoagulation       Diet: ADULT DIET; Regular;  Moderately Thick (Honey)  Code Status: Limited  PT/OT: yes  Tele: no  IVF: no    Electronically signed by Blumaro Ramirez PA-C on 11/22/2021 at 8:39 AM Is This A New Presentation, Or A Follow-Up?: Skin Lesion What Type Of Note Output Would You Prefer (Optional)?: Standard Output Has Your Skin Lesion Been Treated?: not been treated

## 2021-11-22 NOTE — PROGRESS NOTES
[facility]  PHYSICAL THERAPY MISSED TREATMENT NOTE  ACUTE CARE  [facility] 6E              Missed Treatment  Speech therapy present at time of attempt. Will check back again later if time allows.

## 2021-11-22 NOTE — CARE COORDINATION
Discharge Planning Update:       Treating sepsis, UTI, Alzheimers. PT/OT following/ Appeal started for acceptance to [facility]. SW following.

## 2021-11-23 PROCEDURE — 97116 GAIT TRAINING THERAPY: CPT

## 2021-11-23 PROCEDURE — 6370000000 HC RX 637 (ALT 250 FOR IP): Performed by: STUDENT IN AN ORGANIZED HEALTH CARE EDUCATION/TRAINING PROGRAM

## 2021-11-23 PROCEDURE — 1200000000 HC SEMI PRIVATE

## 2021-11-23 PROCEDURE — 6360000002 HC RX W HCPCS: Performed by: PEDIATRICS

## 2021-11-23 PROCEDURE — 99232 SBSQ HOSP IP/OBS MODERATE 35: CPT | Performed by: PHYSICIAN ASSISTANT

## 2021-11-23 PROCEDURE — 97110 THERAPEUTIC EXERCISES: CPT

## 2021-11-23 PROCEDURE — 6370000000 HC RX 637 (ALT 250 FOR IP): Performed by: INTERNAL MEDICINE

## 2021-11-23 PROCEDURE — 96372 THER/PROPH/DIAG INJ SC/IM: CPT

## 2021-11-23 PROCEDURE — 6370000000 HC RX 637 (ALT 250 FOR IP): Performed by: PHYSICIAN ASSISTANT

## 2021-11-23 RX ADMIN — ENOXAPARIN SODIUM 40 MG: 100 INJECTION SUBCUTANEOUS at 11:01

## 2021-11-23 RX ADMIN — MICONAZOLE NITRATE: 20 POWDER TOPICAL at 20:57

## 2021-11-23 RX ADMIN — LEVOTHYROXINE SODIUM 125 MCG: 0.12 TABLET ORAL at 06:39

## 2021-11-23 RX ADMIN — AMLODIPINE BESYLATE 10 MG: 10 TABLET ORAL at 08:11

## 2021-11-23 RX ADMIN — MICONAZOLE NITRATE: 20 POWDER TOPICAL at 08:11

## 2021-11-23 RX ADMIN — Medication 1000 MCG: at 08:11

## 2021-11-23 ASSESSMENT — PAIN SCALES - GENERAL
PAINLEVEL_OUTOF10: 0

## 2021-11-23 NOTE — CARE COORDINATION
Discharge Planning Update:     Treating sepsis, UTI, Alzheimers. PT/OT following/ Appeal in progress for acceptance to [facility]. SW following.

## 2021-11-23 NOTE — PROGRESS NOTES
[address] [address]  INPATIENT PHYSICAL THERAPY  DAILY NOTE  [facility] [address] - 6E-67/067-A    Time In: 1027  Time Out: 1050  Timed Code Treatment Minutes: 23 Minutes  Minutes: 23          Date: 2021  Patient Name: Fercho Charles,  Gender:  male        MRN: [id number]  : 1926  (89 y.o.)     Referring Practitioner: Sharron Roque MD  Diagnosis: elevated troponin level  Additional Pertinent Hx: Per chart Mame Vazquez is a 80 y.o. male with PMHx of prostate cancer status post TURP with history of UTIs, hypertension, hyperlipidemia, hypothyroidism, nephrolithiasis, and osteoarthritis who was brought into the emergency room here at [facility] by EMS on 11/10/2021 with complaints of about a 2-day history of altered mentation increased confusion decreased interaction and inability to care for himself like he normally did. At the time of evaluation and interview in the ER patient disoriented to time and unable to provide much information about why he is in the emergency room and what happened at home. He states that he is just unable to get it altogether for some reason. Per emergency staff notes, patient was brought in because his son realized that patient was not acting himself with increased confusion, decreased interaction and decreased energy, staying in the bathtub for 3 hours with no water despite being instructed by the son to call after he was done showering. Son also is unsure about whether patient has been taking his medications at home lately and whether he has been taking them correctly. It appears that patient has been living alone but failing to care for herself and needs higher level of care at this time. Denies any nausea vomiting abdominal pain or diarrhea. No fevers or chills or sore throat or shortness of breath. No chest pain. \"     Prior Level of Function:  Lives With: Alone  Type of Home: House  Home Layout: Two level, Bed/Bath upstairs  Home Access: Stairs to enter with rails  Entrance Stairs - Number of Steps: 2   Bathroom Shower/Tub: Tub/Shower unit  Bathroom Toilet: Standard  Bathroom Equipment: Grab bars in shower  Bathroom Accessibility: Accessible    Receives Help From: Family  ADL Assistance: Independent  Homemaking Assistance: Independent  Ambulation Assistance: Independent  Transfer Assistance: Independent  Active : Yes  Additional Comments: pt is a poor historian, unabel to get accurate or much information from pt due to AMS at this time. Hx taken from chart. reports he uses no AD prior to admit. Restrictions/Precautions:  Restrictions/Precautions: General Precautions, Fall Risk  Position Activity Restriction  Other position/activity restrictions: increased confusion/AMS     SUBJECTIVE: RN approved session. Pt resting in bedside chair, agreeable to therapy. Pt pleasantly confused throughout session and demos very poor safety awareness. PAIN: 0/10: denies pain    Vitals: Vitals not assessed per clinical judgement, see nursing flowsheet    OBJECTIVE:  Bed Mobility:  Not Tested    Transfers:  Sit to Stand: [employer], cues for hand placement  Stand to [place] [address], cues for hand placement    Ambulation:  Contact Guard Assistance  Distance: 80 feet x1   Surface: Level Tile  Device:Rolling Walker  Gait Deviations:  Very poor safety awareness, pt kept picking up and tipping walker despite max cuing to just push AD to advance. Pt demos instability, especially when turning as pt picked up AD and turned quickly around each corner despite max education and encouragement to keep walker on the floor. Not safe to be up unassisted. Balance:  Dynamic Standing Balance: Contact Guard Assistance, Minimal Assistance    Exercise:  Patient was guided in 1 set(s) 15 reps of exercise to both lower extremities. Seated marches, Seated hamstring curls, Seated heel/toe raises, Long arc quads and Seated abduction/adduction. Exercises were completed for increased independence with functional mobility. Functional Outcome Measures: Completed  AM-PAC Inpatient Mobility Raw Score : 17  AM-PAC Inpatient T-Scale Score : 42.13    ASSESSMENT:  Assessment: Patient progressing toward established goals. and Pt tolerated fairly well. Limited by poor cognition, decreased safety awareness, and high fall risk. Pt is not safe to be up unassisted. Would benefit from ECF with PT at discharge. Activity Tolerance:  Patient tolerance of  treatment: good. Equipment Recommendations:Equipment Needed: No  Discharge Recommendations: ECF with PT  Plan: Times per week: 3-5x GM  Current Treatment Recommendations: Strengthening, Neuromuscular Re-education, Home Exercise Program, Safety Education & Training, Balance Training, Endurance Training, Patient/Caregiver Education & Training, Positioning    Patient Education  Patient Education: Plan of Care, Transfers, Gait    Goals:  Patient goals : unable to state  Short term goals  Time Frame for Short term goals: by discharge  Short term goal 1: Pt will demo SBA for bed mobility tasks with modifications to get in and out of bed  Short term goal 2: Pt to complete sit to stand and return with safe technique with SBA to get on and off various surfaces  Short term goal 3: Pt will ambulate with/without RW 50 feet with SBA and straight pathand no LOB to walk safely household distances  Short term goal 4: NA  Long term goals  Time Frame for Long term goals : NA due to short ELOS    Following session, patient left in safe position with all fall risk precautions in place.

## 2021-11-23 NOTE — CARE COORDINATION
11/23/21, 11:45 AM EST    DISCHARGE PLANNING EVALUATION    Spoke with Leobardo at [facility] and no updated from the insurance at this time.

## 2021-11-23 NOTE — PROGRESS NOTES
Hospitalist Progress Note    Patient:  Veronica Andrews    YOB: 1926  Unit/Bed:6E-67/067-A  MRN: [id number]    Acct: [de-identified]   PCP: OLGA Kemp CNP    Date of Admission: 11/10/2021      Assessment/Plan:      #Acute metabolic encephalopathy (resolved) in setting of Dementia (progressive) likely 2/2 dehydration and possibly transient hypoxia. Per prior notes, \"More information from the son Hernesto Gore appears to show that pt was found in bath tub, with no water left in it, a stand alone heater was blaring and had the temperature of the bathroom to a staggering degree\". -also component of uncontrolled hypothyroidism (poor compliance) and B12 deficiency   -see below for management of this  -delirium precautions  -consider for TRICIA screening  -CT head with mod-severe volume loss -> c/w diagnosis of dementia (possible vascular component with atherosclerosis)   -also consider hypothyroidism and b12 deficiency contributing.   -MOCA assessment, pt scored 11/30.   -resolved and seems to be closer to baseline  -patient not safe for home alone, PT/OT on board recommend SNF. Son is in agreement with plan.       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  - Avoid nephrotoxic agents and renally dose medications  - repeat bmp on 11/18 stable at 1.4  - f/up with Family Physician with repeat BMP     #Asymptomatic bacteruria in setting of Hx of Chronic UTI/Incontinence: Likely colonized bacteria. No fevers or leukocytosis. Multiple previous cultures show E faecalis, Strep agalactiae, Staph aureus, and Staph epi. Received ceftriaxone on admission.   - D/c Abx as it does not appear to be a UTI at this time   - No fevers or other symptoms at this time - will monitor.      #Hypothyroidism, uncontrolled: non-adherence to medication. .8, Free T4 0.56. Per pts daughter the patient has had iatrogenic destruction of the thyroid, but uncertain of exact details.  This was also unconfirmed via chart review. - Resume levothyroxine at 50mcg, increased to 100mcg on 11/18 as FT4 still 0.5  - FT4 0.68. Synthroid increased to 125mcg on 11/22. Repeat in 5-7 days. - recommend repeat TSH and t4 outpatient in 6-8 weeks    #B12 deficiency: in low 200s, starting on oral replacement, may be contributing to symptoms. #Mild Fluid Overload: suspect related to renal disease. No echo seen in chart. Hold off diuresis at this time as satting well on room air, and also given his prior VADIM. Not on any at home. Further workup can be pursued outpatient.      #HTN : Controlled with some lability.  - Continue amlodipine with holding parameters     #Hx of prostate cancer: s/p radiation in 2000.     #Hx of bladder stones: Noted. Follows with Dr. Garcia (urology) outpatient.     #Code status: Patient's daughter and son are POA. A long conversation with the son was had along with palliative and the decision has been made to change pt to Limited No x4.          Expected discharge date:  pending    Disposition: SNF, precert denied, appeal is in process. [] Home  [] TCU  [] Rehab  [] Psych  [x] SNF  [] [address]  [] Other-    ===================================================================      Chief Complaint: ams    80year old male who presented to [facility] with AMS and inability to care for self at home. Subjective (past 24 hours): I took over care 11/22. Patient awaiting SNF insurance appeal.  Patient is alert, oriented to self and place, somewhat to situation. No acute events overnight. Denies fever/chills, chest pain, shortness of breath, abdominal pain. Abdomen seems distended, but is soft and nontender. Patient's last bowel movement yesterday per nursing.   Patient tolerating general diet    Medications:  Reviewed    Infusion Medications    sodium chloride       Scheduled Medications    levothyroxine  125 mcg Oral Daily    vitamin B-12  1,000 mcg Oral Daily    enoxaparin 40 mg SubCUTAneous Daily    sterile water  1.2 mL IntraMUSCular Once    amLODIPine  10 mg Oral Daily    sodium chloride flush  10 mL IntraVENous 2 times per day    [Held by provider] aspirin  81 mg Oral Daily    miconazole   Topical BID     PRN Meds: potassium chloride **OR** potassium alternative oral replacement **OR** potassium chloride, melatonin, ziprasidone **AND** sterile water, sodium chloride, ondansetron **OR** ondansetron, polyethylene glycol, acetaminophen **OR** acetaminophen, aluminum & magnesium hydroxide-simethicone      ROS: reviewed from prior note, full ROS unchanged unless otherwise stated in hospital course/subjective portion. Intake/Output Summary (Last 24 hours) at 11/23/2021 1509  Last data filed at 11/23/2021 1323  Gross per 24 hour   Intake 840 ml   Output 1200 ml   Net -360 ml       Exam:  BP (!) 163/83   Pulse 67   Temp 97.9 °F (36.6 °C) (Oral)   Resp 18   Ht 6' (1.829 m)   Wt 184 lb 9.6 oz (83.7 kg)   SpO2 97%   BMI 25.04 kg/m²     General appearance: Pleasantly confused, no distress  Eyes:  PERRL. Conjunctivae/corneas clear. HENT: Head normal appearing. Nares normal. Oral mucosa moist.  Hearing intact. Neck: Supple, with full range of motion. Trachea midline. No gross JVD appreciated. Respiratory:  Normal effort. Clear to auscultation, without wheezes or rhonchi. Trace crackles in bases. Cardiovascular: Normal rate, regular rhythm with normal S1/S2 without murmurs. Trace lower extremity edema bilaterally. Abdomen: Soft, non-tender, non-distended with normal bowel sounds. Protuberant  Musculoskeletal: No joint swelling or tenderness. Normal tone. No abnormal movements. Skin: Warm and dry. No rashes or lesions. Neurologic:  No focal sensory/motor deficits in the upper or lower extremities. Cranial nerves:  grossly non-focal 2-12. Psychiatric: Alert and oriented to self only, impaired insight  Capillary Refill: Brisk,< 3 seconds.   Peripheral Pulses: +2 palpable, equal bilaterally. Labs:   No results for input(s): WBC, HGB, HCT, PLT in the last 72 hours. No results for input(s): NA, K, CL, CO2, BUN, CREATININE, CALCIUM, PHOS in the last 72 hours. Invalid input(s): MAGNES  No results for input(s): AST, ALT, BILIDIR, BILITOT, ALKPHOS in the last 72 hours. No results for input(s): INR in the last 72 hours. No results for input(s): Drea Breanne in the last 72 hours. No results for input(s): PROCAL in the last 72 hours. Lab Results   Component Value Date    NITRU NEGATIVE 11/10/2021    WBCUA OBSCURED 11/10/2021    BACTERIA FEW 11/10/2021    RBCUA OBSCURED 11/10/2021    BLOODU LARGE 11/10/2021    SPECGRAV >=1.030 01/13/2020    SPECGRAV 1.013 01/14/2017    GLUCOSEU NEGATIVE 11/10/2021       Radiology (48 hours):  No results found. DVT prophylaxis:    [x] Lovenox  [] SCDs  [] SQ Heparin  [] Encourage ambulation   [] Already on Anticoagulation       Diet: ADULT DIET; Regular;  Moderately Thick (Honey)  Code Status: Limited  PT/OT: yes  Tele: no  IVF: no    Electronically signed by Dhaval Simon PA-C on 11/23/2021 at 3:09 PM

## 2021-11-24 PROCEDURE — 96372 THER/PROPH/DIAG INJ SC/IM: CPT

## 2021-11-24 PROCEDURE — 6360000002 HC RX W HCPCS: Performed by: PEDIATRICS

## 2021-11-24 PROCEDURE — 6370000000 HC RX 637 (ALT 250 FOR IP): Performed by: PHYSICIAN ASSISTANT

## 2021-11-24 PROCEDURE — 6370000000 HC RX 637 (ALT 250 FOR IP): Performed by: STUDENT IN AN ORGANIZED HEALTH CARE EDUCATION/TRAINING PROGRAM

## 2021-11-24 PROCEDURE — 99231 SBSQ HOSP IP/OBS SF/LOW 25: CPT | Performed by: PHYSICIAN ASSISTANT

## 2021-11-24 PROCEDURE — 6370000000 HC RX 637 (ALT 250 FOR IP): Performed by: INTERNAL MEDICINE

## 2021-11-24 PROCEDURE — 1200000000 HC SEMI PRIVATE

## 2021-11-24 RX ADMIN — AMLODIPINE BESYLATE 10 MG: 10 TABLET ORAL at 07:44

## 2021-11-24 RX ADMIN — LEVOTHYROXINE SODIUM 125 MCG: 0.12 TABLET ORAL at 06:21

## 2021-11-24 RX ADMIN — ENOXAPARIN SODIUM 40 MG: 100 INJECTION SUBCUTANEOUS at 11:32

## 2021-11-24 RX ADMIN — MICONAZOLE NITRATE: 20 POWDER TOPICAL at 07:49

## 2021-11-24 RX ADMIN — Medication 1000 MCG: at 07:44

## 2021-11-24 RX ADMIN — MICONAZOLE NITRATE: 20 POWDER TOPICAL at 19:34

## 2021-11-24 ASSESSMENT — PAIN SCALES - GENERAL
PAINLEVEL_OUTOF10: 0

## 2021-11-24 NOTE — PROGRESS NOTES
was also unconfirmed via chart review. - Resume levothyroxine at 50mcg, increased to 100mcg on 11/18 as FT4 still 0.5  - FT4 0.68. Synthroid increased to 125mcg on 11/22. Repeat in 5-7 days. - recommend repeat TSH and t4 outpatient in 6-8 weeks    #B12 deficiency: in low 200s, starting on oral replacement, may be contributing to symptoms. #Mild Fluid Overload: suspect related to renal disease. No echo seen in chart. Hold off diuresis at this time as satting well on room air, and also given his prior VADIM. Not on any at home. Further workup can be pursued outpatient.      #HTN : Controlled with some lability.  - Continue amlodipine with holding parameters     #Hx of prostate cancer: s/p radiation in 2000.     #Hx of bladder stones: Noted. Follows with Dr. Garcia (urology) outpatient.     #Code status: Patient's daughter and son are POA. A long conversation with the son was had along with palliative and the decision has been made to change pt to Limited No x4.          Expected discharge date:  pending    Disposition: SNF, precert denied, appeal is in process. [] Home  [] TCU  [] Rehab  [] Psych  [x] SNF  [] [place]  [] Other-    ===================================================================      Chief Complaint: ams    80year old male who presented to [facility] with AMS and inability to care for self at home. Subjective (past 24 hours):   Patient awaiting SNF insurance appeal. Pt asleep in chair upon evaluation. Patient is alert, oriented to self and place, somewhat to situation. No acute events overnight.   \    Medications:  Reviewed    Infusion Medications    sodium chloride       Scheduled Medications    levothyroxine  125 mcg Oral Daily    vitamin B-12  1,000 mcg Oral Daily    enoxaparin  40 mg SubCUTAneous Daily    sterile water  1.2 mL IntraMUSCular Once    amLODIPine  10 mg Oral Daily    sodium chloride flush  10 mL IntraVENous 2 times per day    [Held by provider] aspirin  81 mg Oral Daily    miconazole   Topical BID     PRN Meds: potassium chloride **OR** potassium alternative oral replacement **OR** potassium chloride, melatonin, ziprasidone **AND** sterile water, sodium chloride, ondansetron **OR** ondansetron, polyethylene glycol, acetaminophen **OR** acetaminophen, aluminum & magnesium hydroxide-simethicone      ROS: reviewed from prior note, full ROS unchanged unless otherwise stated in hospital course/subjective portion. Intake/Output Summary (Last 24 hours) at 11/24/2021 1618  Last data filed at 11/24/2021 1517  Gross per 24 hour   Intake 1250 ml   Output 400 ml   Net 850 ml       Exam:  /80   Pulse 76   Temp 97.6 °F (36.4 °C) (Oral)   Resp 20   Ht 6' (1.829 m)   Wt 184 lb 9.6 oz (83.7 kg)   SpO2 98%   BMI 25.04 kg/m²     General appearance: Pleasantly confused, no distress  Eyes:  PERRL. Conjunctivae/corneas clear. HENT: Head normal appearing. Nares normal. Oral mucosa moist.  Hearing intact. Neck: Supple, with full range of motion. Trachea midline. No gross JVD appreciated. Respiratory:  Normal effort. Clear to auscultation, without wheezes or rhonchi. Trace crackles in bases. Cardiovascular: Normal rate, regular rhythm with normal S1/S2 without murmurs. Trace lower extremity edema bilaterally. Abdomen: Soft, non-tender, non-distended with normal bowel sounds. Protuberant  Musculoskeletal: No joint swelling or tenderness. Normal tone. No abnormal movements. Skin: Warm and dry. No rashes or lesions. Neurologic:  No focal sensory/motor deficits in the upper or lower extremities. Cranial nerves:  grossly non-focal 2-12. Psychiatric: Alert and oriented to self only, impaired insight  Capillary Refill: Brisk,< 3 seconds. Peripheral Pulses: +2 palpable, equal bilaterally. Labs:   No results for input(s): WBC, HGB, HCT, PLT in the last 72 hours.   No results for input(s): NA, K, CL, CO2, BUN, CREATININE, CALCIUM, PHOS in the last 72 hours. Invalid input(s): MAGNES  No results for input(s): AST, ALT, BILIDIR, BILITOT, ALKPHOS in the last 72 hours. No results for input(s): INR in the last 72 hours. No results for input(s): Bhumika Mould in the last 72 hours. No results for input(s): PROCAL in the last 72 hours. Lab Results   Component Value Date    NITRU NEGATIVE 11/10/2021    WBCUA OBSCURED 11/10/2021    BACTERIA FEW 11/10/2021    RBCUA OBSCURED 11/10/2021    BLOODU LARGE 11/10/2021    SPECGRAV >=1.030 01/13/2020    SPECGRAV 1.013 01/14/2017    GLUCOSEU NEGATIVE 11/10/2021       Radiology (48 hours):  No results found. DVT prophylaxis:    [x] Lovenox  [] SCDs  [] SQ Heparin  [] Encourage ambulation   [] Already on Anticoagulation       Diet: ADULT DIET; Regular;  Moderately Thick (Honey)  Code Status: Limited  PT/OT: yes  Tele: no  IVF: no    Electronically signed by RUSSELL Quevedo on 11/24/2021 at 4:18 PM

## 2021-11-24 NOTE — CARE COORDINATION
11/24/21, 1:35 PM EST    DISCHARGE PLANNING EVALUATION    Spoke with Leobardo at facility and no return to pre-cert appeal that this time.

## 2021-11-24 NOTE — CARE COORDINATION
Discharge Planning Update:     Treating sepsis, UTI, Alzheimers. PT/OT following/ Appeal in progress for acceptance to [facility].  SW following. Awaiting for pre-cert from appeal.

## 2021-11-25 LAB
ANION GAP SERPL CALCULATED.3IONS-SCNC: 10 MEQ/L (ref 8–16)
BUN BLDV-MCNC: 35 MG/DL (ref 7–22)
CALCIUM SERPL-MCNC: 8.9 MG/DL (ref 8.5–10.5)
CHLORIDE BLD-SCNC: 109 MEQ/L (ref 98–111)
CO2: 23 MEQ/L (ref 23–33)
CREAT SERPL-MCNC: 1.2 MG/DL (ref 0.4–1.2)
ERYTHROCYTE [DISTWIDTH] IN BLOOD BY AUTOMATED COUNT: 13.1 % (ref 11.5–14.5)
ERYTHROCYTE [DISTWIDTH] IN BLOOD BY AUTOMATED COUNT: 50.5 FL (ref 35–45)
GFR SERPL CREATININE-BSD FRML MDRD: 56 ML/MIN/1.73M2
GLUCOSE BLD-MCNC: 86 MG/DL (ref 70–108)
HCT VFR BLD CALC: 36.5 % (ref 42–52)
HEMOGLOBIN: 12.3 GM/DL (ref 14–18)
MCH RBC QN AUTO: 35.9 PG (ref 26–33)
MCHC RBC AUTO-ENTMCNC: 33.7 GM/DL (ref 32.2–35.5)
MCV RBC AUTO: 106.4 FL (ref 80–94)
PLATELET # BLD: 261 THOU/MM3 (ref 130–400)
PMV BLD AUTO: 9.4 FL (ref 9.4–12.4)
POTASSIUM REFLEX MAGNESIUM: 4.2 MEQ/L (ref 3.5–5.2)
RBC # BLD: 3.43 MILL/MM3 (ref 4.7–6.1)
SODIUM BLD-SCNC: 142 MEQ/L (ref 135–145)
WBC # BLD: 5.4 THOU/MM3 (ref 4.8–10.8)

## 2021-11-25 PROCEDURE — 85027 COMPLETE CBC AUTOMATED: CPT

## 2021-11-25 PROCEDURE — 1200000000 HC SEMI PRIVATE

## 2021-11-25 PROCEDURE — 96372 THER/PROPH/DIAG INJ SC/IM: CPT

## 2021-11-25 PROCEDURE — 36415 COLL VENOUS BLD VENIPUNCTURE: CPT

## 2021-11-25 PROCEDURE — 6360000002 HC RX W HCPCS: Performed by: PEDIATRICS

## 2021-11-25 PROCEDURE — 80048 BASIC METABOLIC PNL TOTAL CA: CPT

## 2021-11-25 PROCEDURE — 6370000000 HC RX 637 (ALT 250 FOR IP): Performed by: PHYSICIAN ASSISTANT

## 2021-11-25 PROCEDURE — 6370000000 HC RX 637 (ALT 250 FOR IP): Performed by: STUDENT IN AN ORGANIZED HEALTH CARE EDUCATION/TRAINING PROGRAM

## 2021-11-25 PROCEDURE — 99231 SBSQ HOSP IP/OBS SF/LOW 25: CPT | Performed by: PHYSICIAN ASSISTANT

## 2021-11-25 PROCEDURE — 6370000000 HC RX 637 (ALT 250 FOR IP): Performed by: INTERNAL MEDICINE

## 2021-11-25 RX ADMIN — LEVOTHYROXINE SODIUM 125 MCG: 0.12 TABLET ORAL at 05:12

## 2021-11-25 RX ADMIN — AMLODIPINE BESYLATE 10 MG: 10 TABLET ORAL at 08:24

## 2021-11-25 RX ADMIN — MICONAZOLE NITRATE: 20 POWDER TOPICAL at 21:07

## 2021-11-25 RX ADMIN — MICONAZOLE NITRATE: 20 POWDER TOPICAL at 08:25

## 2021-11-25 RX ADMIN — ENOXAPARIN SODIUM 40 MG: 100 INJECTION SUBCUTANEOUS at 11:46

## 2021-11-25 RX ADMIN — Medication 1000 MCG: at 08:24

## 2021-11-25 ASSESSMENT — PAIN SCALES - GENERAL
PAINLEVEL_OUTOF10: 0
PAINLEVEL_OUTOF10: 0

## 2021-11-25 NOTE — PROGRESS NOTES
Hospitalist Progress Note    Patient:  Veronica Andrews    YOB: 1926  Unit/Bed:6E-67/067-A  MRN: [id number]    Acct: [de-identified]   PCP: OLGA Kemp CNP    Date of Admission: 11/10/2021      Assessment/Plan:      #Acute metabolic encephalopathy (resolved) in setting of Dementia (progressive) likely 2/2 dehydration and possibly transient hypoxia. Per prior notes, \"More information from the son Hernesto Gore appears to show that pt was found in bath tub, with no water left in it, a stand alone heater was blaring and had the temperature of the bathroom to a staggering degree\". -also component of uncontrolled hypothyroidism (poor compliance) and B12 deficiency   -see below for management of this  -delirium precautions  -consider for TRICIA screening  -CT head with mod-severe volume loss -> c/w diagnosis of dementia (possible vascular component with atherosclerosis)   -also consider hypothyroidism and b12 deficiency contributing.   -MOCA assessment, pt scored 11/30.   -resolved and seems to be closer to baseline  -patient not safe for home alone, PT/OT on board recommend SNF. Son is in agreement with plan. Pre-cert denied; appeal in process.      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  - Avoid nephrotoxic agents and renally dose medications  - repeat bmp on 11/18 stable at 1.4  - Repeat BMP tomorrow AM    #Asymptomatic bacteruria in setting of Hx of Chronic UTI/Incontinence: Likely colonized bacteria. No fevers or leukocytosis. Multiple previous cultures show E faecalis, Strep agalactiae, Staph aureus, and Staph epi. Received ceftriaxone on admission.   - D/c Abx as it does not appear to be a UTI at this time   - No fevers or other symptoms at this time - will monitor.      #Hypothyroidism, uncontrolled: non-adherence to medication. .8, Free T4 0.56. Per pts daughter the patient has had iatrogenic destruction of the thyroid, but uncertain of exact details.  This was also unconfirmed via chart review. - Resume levothyroxine at 50mcg, increased to 100mcg on 11/18 as FT4 still 0.5  - FT4 0.68. Synthroid increased to 125mcg on 11/22. Repeat in 5-7 days. - recommend repeat TSH and t4 outpatient in 6-8 weeks    #B12 deficiency: in low 200s, starting on oral replacement, may be contributing to symptoms. #Mild Fluid Overload: suspect related to renal disease. No echo seen in chart. Hold off diuresis at this time as satting well on room air, and also given his prior VADIM. Not on any at home. Further workup can be pursued outpatient.      #HTN : Controlled with some lability.  - Continue amlodipine with holding parameters     #Hx of prostate cancer: s/p radiation in 2000.     #Hx of bladder stones: Noted. Follows with Dr. Garcia (urology) outpatient.     #Code status: Patient's daughter and son are POA. A long conversation with the son was had along with palliative and the decision has been made to change pt to Limited No x4.          Expected discharge date:  pending    Disposition: SNF, precert denied, appeal is in process. [] Home  [] TCU  [] Rehab  [] Psych  [x] SNF  [] [place]  [] Other-    ===================================================================      Chief Complaint: ams    80year old male who presented to [facility] with AMS and inability to care for self at home. Subjective (past 24 hours):   Patient awaiting SNF insurance appeal. Pt was alert and more conversational today. Pt still only A&Ox2. Pt is in good spirits.      Medications:  Reviewed    Infusion Medications    sodium chloride       Scheduled Medications    levothyroxine  125 mcg Oral Daily    vitamin B-12  1,000 mcg Oral Daily    enoxaparin  40 mg SubCUTAneous Daily    sterile water  1.2 mL IntraMUSCular Once    amLODIPine  10 mg Oral Daily    sodium chloride flush  10 mL IntraVENous 2 times per day    [Held by provider] aspirin  81 mg Oral Daily    miconazole BILIDIR, BILITOT, ALKPHOS in the last 72 hours. No results for input(s): INR in the last 72 hours. No results for input(s): Ardelle Chalk in the last 72 hours. No results for input(s): PROCAL in the last 72 hours. Lab Results   Component Value Date    NITRU NEGATIVE 11/10/2021    WBCUA OBSCURED 11/10/2021    BACTERIA FEW 11/10/2021    RBCUA OBSCURED 11/10/2021    BLOODU LARGE 11/10/2021    SPECGRAV >=1.030 01/13/2020    SPECGRAV 1.013 01/14/2017    GLUCOSEU NEGATIVE 11/10/2021       Radiology (48 hours):  No results found. DVT prophylaxis:    [x] Lovenox  [] SCDs  [] SQ Heparin  [] Encourage ambulation   [] Already on Anticoagulation       Diet: ADULT DIET; Regular;  Moderately Thick (Honey)  Code Status: Limited  PT/OT: yes  Tele: no  IVF: no    Electronically signed by RUSSELL Brown on 11/25/2021 at 10:02 AM

## 2021-11-26 PROCEDURE — 6370000000 HC RX 637 (ALT 250 FOR IP): Performed by: STUDENT IN AN ORGANIZED HEALTH CARE EDUCATION/TRAINING PROGRAM

## 2021-11-26 PROCEDURE — 6370000000 HC RX 637 (ALT 250 FOR IP): Performed by: INTERNAL MEDICINE

## 2021-11-26 PROCEDURE — 99231 SBSQ HOSP IP/OBS SF/LOW 25: CPT | Performed by: PHYSICIAN ASSISTANT

## 2021-11-26 PROCEDURE — 6370000000 HC RX 637 (ALT 250 FOR IP): Performed by: PHYSICIAN ASSISTANT

## 2021-11-26 PROCEDURE — 1200000000 HC SEMI PRIVATE

## 2021-11-26 PROCEDURE — 6360000002 HC RX W HCPCS: Performed by: PEDIATRICS

## 2021-11-26 PROCEDURE — 96372 THER/PROPH/DIAG INJ SC/IM: CPT

## 2021-11-26 RX ADMIN — Medication 1000 MCG: at 08:17

## 2021-11-26 RX ADMIN — AMLODIPINE BESYLATE 10 MG: 10 TABLET ORAL at 08:17

## 2021-11-26 RX ADMIN — MICONAZOLE NITRATE: 20 POWDER TOPICAL at 19:31

## 2021-11-26 RX ADMIN — ENOXAPARIN SODIUM 40 MG: 100 INJECTION SUBCUTANEOUS at 14:04

## 2021-11-26 RX ADMIN — LEVOTHYROXINE SODIUM 125 MCG: 0.12 TABLET ORAL at 06:20

## 2021-11-26 RX ADMIN — MICONAZOLE NITRATE: 20 POWDER TOPICAL at 08:17

## 2021-11-26 ASSESSMENT — PAIN SCALES - GENERAL: PAINLEVEL_OUTOF10: 0

## 2021-11-26 NOTE — PROGRESS NOTES
Hospitalist Progress Note    Patient:  Lori Baez    YOB: 1926  Unit/Bed:6E-67/067-A  MRN: [id number]    Acct: [de-identified]   PCP: OLGA Renee CNP    Date of Admission: 11/10/2021      Assessment/Plan:      #Acute metabolic encephalopathy (resolved) in setting of Dementia (progressive) likely 2/2 dehydration and possibly transient hypoxia. Per prior notes, \"More information from the son Meghna Serrano appears to show that pt was found in bath tub, with no water left in it, a stand alone heater was blaring and had the temperature of the bathroom to a staggering degree\". -also component of uncontrolled hypothyroidism (poor compliance) and B12 deficiency   -see below for management of this  -delirium precautions  -consider for TRICIA screening  -CT head with mod-severe volume loss -> c/w diagnosis of dementia (possible vascular component with atherosclerosis)   -also consider hypothyroidism and b12 deficiency contributing.   -MOCA assessment, pt scored 11/30.   -resolved and seems to be closer to baseline  -patient not safe for home alone, PT/OT on board recommend SNF. Son is in agreement with plan. Pre-cert denied; appeal in process.      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  - Avoid nephrotoxic agents and renally dose medications  - repeat bmp on 11/18 stable at 1.4  - Repeat BMP tomorrow AM    #Asymptomatic bacteruria in setting of Hx of Chronic UTI/Incontinence: Likely colonized bacteria. No fevers or leukocytosis. Multiple previous cultures show E faecalis, Strep agalactiae, Staph aureus, and Staph epi. Received ceftriaxone on admission.   - D/c Abx as it does not appear to be a UTI at this time   - No fevers or other symptoms at this time - will monitor.      #Hypothyroidism, uncontrolled: non-adherence to medication. .8, Free T4 0.56. Per pts daughter the patient has had iatrogenic destruction of the thyroid, but uncertain of exact details.  This was also unconfirmed via chart review. - Resume levothyroxine at 50mcg, increased to 100mcg on 11/18 as FT4 still 0.5  - FT4 0.68. Synthroid increased to 125mcg on 11/22. Repeat in 5-7 days. - recommend repeat TSH and t4 outpatient in 6-8 weeks    #B12 deficiency: in low 200s, starting on oral replacement, may be contributing to symptoms. #Mild Fluid Overload: suspect related to renal disease. No echo seen in chart. Hold off diuresis at this time as satting well on room air, and also given his prior VADIM. Not on any at home. Further workup can be pursued outpatient.      #HTN : Controlled with some lability.  - Continue amlodipine with holding parameters     #Hx of prostate cancer: s/p radiation in 2000.     #Hx of bladder stones: Noted. Follows with Dr. Garcia (urology) outpatient.     #Code status: Patient's daughter and son are POA. A long conversation with the son was had along with palliative and the decision has been made to change pt to Limited No x4.          Expected discharge date:  pending    Disposition: SNF, precert denied, appeal is in process. [] Home  [] TCU  [] Rehab  [] Psych  [x] SNF  [] [place]  [] Other-    ===================================================================      Chief Complaint: ams    80year old male who presented to [facility] with AMS and inability to care for self at home. Subjective (past 24 hours):   Patient awaiting SNF insurance appeal. Pt was sitting in the chair during evaluation. No acute issues or concerns.      Medications:  Reviewed    Infusion Medications    sodium chloride       Scheduled Medications    levothyroxine  125 mcg Oral Daily    vitamin B-12  1,000 mcg Oral Daily    enoxaparin  40 mg SubCUTAneous Daily    sterile water  1.2 mL IntraMUSCular Once    amLODIPine  10 mg Oral Daily    sodium chloride flush  10 mL IntraVENous 2 times per day    [Held by provider] aspirin  81 mg Oral Daily    miconazole   Topical BID PRN Meds: potassium chloride **OR** potassium alternative oral replacement **OR** potassium chloride, melatonin, ziprasidone **AND** sterile water, sodium chloride, ondansetron **OR** ondansetron, polyethylene glycol, acetaminophen **OR** acetaminophen, aluminum & magnesium hydroxide-simethicone      ROS: reviewed from prior note, full ROS unchanged unless otherwise stated in hospital course/subjective portion. Intake/Output Summary (Last 24 hours) at 11/26/2021 1036  Last data filed at 11/25/2021 1840  Gross per 24 hour   Intake 240 ml   Output --   Net 240 ml       Exam:  BP (!) 145/82   Pulse 73   Temp 97.6 °F (36.4 °C) (Oral)   Resp 18   Ht 6' (1.829 m)   Wt 184 lb 9.6 oz (83.7 kg)   SpO2 98%   BMI 25.04 kg/m²     General appearance: Pleasantly confused, no distress  Eyes:  PERRL. Conjunctivae/corneas clear. HENT: Head normal appearing. Nares normal. Oral mucosa moist.  Hearing intact. Neck: Supple, with full range of motion. Trachea midline. No gross JVD appreciated. Respiratory:  Normal effort. Clear to auscultation, without wheezes or rhonchi. Trace crackles in bases. Cardiovascular: Normal rate, regular rhythm with normal S1/S2 without murmurs. Abdomen: Soft, non-tender, non-distended with normal bowel sounds. Protuberant  Musculoskeletal: No joint swelling or tenderness. Normal tone. No abnormal movements. Skin: Warm and dry. No rashes or lesions. Neurologic:  No focal sensory/motor deficits in the upper or lower extremities. Cranial nerves:  grossly non-focal 2-12. Psychiatric: Alert and oriented to self only, impaired insight  Capillary Refill: Brisk,< 3 seconds. Peripheral Pulses: +2 palpable, equal bilaterally.        Labs:   Recent Labs     11/25/21  0619   WBC 5.4   HGB 12.3*   HCT 36.5*        Recent Labs     11/25/21  0619      K 4.2      CO2 23   BUN 35*   CREATININE 1.2   CALCIUM 8.9     No results for input(s): AST, ALT, BILIDIR, BILITOT, ALKPHOS in the last 72 hours. No results for input(s): INR in the last 72 hours. No results for input(s): Radha Height in the last 72 hours. No results for input(s): PROCAL in the last 72 hours. Lab Results   Component Value Date    NITRU NEGATIVE 11/10/2021    WBCUA OBSCURED 11/10/2021    BACTERIA FEW 11/10/2021    RBCUA OBSCURED 11/10/2021    BLOODU LARGE 11/10/2021    SPECGRAV >=1.030 01/13/2020    SPECGRAV 1.013 01/14/2017    GLUCOSEU NEGATIVE 11/10/2021       Radiology (48 hours):  No results found. DVT prophylaxis:    [x] Lovenox  [] SCDs  [] SQ Heparin  [] Encourage ambulation   [] Already on Anticoagulation       Diet: ADULT DIET; Regular;  Moderately Thick (Honey)  Code Status: Limited  PT/OT: yes  Tele: no  IVF: no    Electronically signed by RUSSELL Marcos on 11/26/2021 at 10:36 AM

## 2021-11-26 NOTE — CARE COORDINATION
Discharge Planning Update: Following for Alzheimers, AMS and inability to care for self. Peer to Peer and Appeal denied. [facility]. SW continues to follow. Nikolas WELLS and Sumeet Mercado RN pt primary RN today aware of the denial. Son is working to find care around the clock for pt.

## 2021-11-26 NOTE — CARE COORDINATION
11/26/21, 10:59 AM EST    DISCHARGE PLANNING EVALUATION      Waiting decision on [employer] appeal for [facility]. Appeal started on 11/19. Family is hoping for approval for ecf. Decision is likely delayed due to holiday. Spoke with [facility] admissions, no response from Roosevelt varghese yet.

## 2021-11-27 PROCEDURE — 1200000000 HC SEMI PRIVATE

## 2021-11-27 PROCEDURE — 6370000000 HC RX 637 (ALT 250 FOR IP): Performed by: INTERNAL MEDICINE

## 2021-11-27 PROCEDURE — 6360000002 HC RX W HCPCS: Performed by: PEDIATRICS

## 2021-11-27 PROCEDURE — 6370000000 HC RX 637 (ALT 250 FOR IP): Performed by: PHYSICIAN ASSISTANT

## 2021-11-27 PROCEDURE — 6370000000 HC RX 637 (ALT 250 FOR IP): Performed by: STUDENT IN AN ORGANIZED HEALTH CARE EDUCATION/TRAINING PROGRAM

## 2021-11-27 PROCEDURE — 99231 SBSQ HOSP IP/OBS SF/LOW 25: CPT | Performed by: PHYSICIAN ASSISTANT

## 2021-11-27 PROCEDURE — 96372 THER/PROPH/DIAG INJ SC/IM: CPT

## 2021-11-27 RX ADMIN — AMLODIPINE BESYLATE 10 MG: 10 TABLET ORAL at 08:42

## 2021-11-27 RX ADMIN — LEVOTHYROXINE SODIUM 125 MCG: 0.12 TABLET ORAL at 06:02

## 2021-11-27 RX ADMIN — Medication 1000 MCG: at 08:42

## 2021-11-27 RX ADMIN — MICONAZOLE NITRATE: 20 POWDER TOPICAL at 08:42

## 2021-11-27 RX ADMIN — ENOXAPARIN SODIUM 40 MG: 100 INJECTION SUBCUTANEOUS at 11:49

## 2021-11-27 RX ADMIN — MICONAZOLE NITRATE: 20 POWDER TOPICAL at 21:29

## 2021-11-27 ASSESSMENT — PAIN SCALES - GENERAL
PAINLEVEL_OUTOF10: 0
PAINLEVEL_OUTOF10: 0

## 2021-11-27 NOTE — PROGRESS NOTES
Hospitalist Progress Note    Patient:  Carmelo Mallory    YOB: 1926  Unit/Bed:6E-67/067-A  MRN: [id number]    Acct: [de-identified]   PCP: OLGA Acevedo CNP    Date of Admission: 11/10/2021      Assessment/Plan:      #Acute metabolic encephalopathy (resolved) in setting of Dementia (progressive) likely 2/2 dehydration and possibly transient hypoxia. Per prior notes, \"More information from the son Page Camarillo appears to show that pt was found in bath tub, with no water left in it, a stand alone heater was blaring and had the temperature of the bathroom to a staggering degree\". -also component of uncontrolled hypothyroidism (poor compliance) and B12 deficiency   -see below for management of this  -delirium precautions  -consider for TRICIA screening  -CT head with mod-severe volume loss -> c/w diagnosis of dementia (possible vascular component with atherosclerosis)   -also consider hypothyroidism and b12 deficiency contributing.   -MOCA assessment, pt scored 11/30.   -resolved and seems to be closer to baseline  -patient not safe for home alone, PT/OT on board recommend SNF. Son is in agreement with plan. Pre-cert denied; appeal denied.       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  - Avoid nephrotoxic agents and renally dose medications  - repeat bmp on 11/18 stable at 1.4    #Asymptomatic bacteruria in setting of Hx of Chronic UTI/Incontinence: Likely colonized bacteria. No fevers or leukocytosis. Multiple previous cultures show E faecalis, Strep agalactiae, Staph aureus, and Staph epi. Received ceftriaxone on admission.   - D/c Abx as it does not appear to be a UTI at this time   - No fevers or other symptoms at this time - will monitor.      #Hypothyroidism, uncontrolled: non-adherence to medication. .8, Free T4 0.56. Per pts daughter the patient has had iatrogenic destruction of the thyroid, but uncertain of exact details.  This was also unconfirmed via chart review. - Resume levothyroxine at 50mcg, increased to 100mcg on 11/18 as FT4 still 0.5  - FT4 0.68. Synthroid increased to 125mcg on 11/22. Repeat in 5-7 days. - recommend repeat TSH and t4 outpatient in 6-8 weeks    #B12 deficiency: in low 200s, starting on oral replacement, may be contributing to symptoms. #Mild Fluid Overload: suspect related to renal disease. No echo seen in chart. Hold off diuresis at this time as satting well on room air, and also given his prior VADIM. Not on any at home. Further workup can be pursued outpatient.      #HTN : Controlled with some lability.  - Continue amlodipine with holding parameters     #Hx of prostate cancer: s/p radiation in 2000.     #Hx of bladder stones: Noted. Follows with Dr. Garcia (urology) outpatient.     #Code status: Patient's daughter and son are POA. A long conversation with the son was had along with palliative and the decision has been made to change pt to Limited No x4.          Expected discharge date:  pending    Disposition: SNF, precert denied, appeal is in process. [] Home  [] TCU  [] Rehab  [] Psych  [x] SNF  [] [place]  [] Other-    ===================================================================      Chief Complaint: ams    80year old male who presented to [facility] with AMS and inability to care for self at home. Subjective (past 24 hours):   SNF appeal was denied. Family is aware. Family is trying to coordinate care for patient. Plan for DC tomorrow. Pt has no issues or concerns.      Medications:  Reviewed    Infusion Medications    sodium chloride       Scheduled Medications    levothyroxine  125 mcg Oral Daily    vitamin B-12  1,000 mcg Oral Daily    enoxaparin  40 mg SubCUTAneous Daily    sterile water  1.2 mL IntraMUSCular Once    amLODIPine  10 mg Oral Daily    sodium chloride flush  10 mL IntraVENous 2 times per day    [Held by provider] aspirin  81 mg Oral Daily    miconazole   Topical BID     PRN Meds: potassium chloride **OR** potassium alternative oral replacement **OR** potassium chloride, melatonin, ziprasidone **AND** sterile water, sodium chloride, ondansetron **OR** ondansetron, polyethylene glycol, acetaminophen **OR** acetaminophen, aluminum & magnesium hydroxide-simethicone      ROS: reviewed from prior note, full ROS unchanged unless otherwise stated in hospital course/subjective portion. Intake/Output Summary (Last 24 hours) at 11/27/2021 1329  Last data filed at 11/27/2021 0904  Gross per 24 hour   Intake 240 ml   Output 1000 ml   Net -760 ml       Exam:  /78   Pulse 87   Temp 97.8 °F (36.6 °C) (Oral)   Resp 18   Ht 6' (1.829 m)   Wt 192 lb (87.1 kg)   SpO2 97%   BMI 26.04 kg/m²     General appearance: Pleasantly confused, no distress  Eyes:  PERRL. Conjunctivae/corneas clear. HENT: Head normal appearing. Nares normal. Oral mucosa moist.  Hearing intact. Neck: Supple, with full range of motion. Trachea midline. No gross JVD appreciated. Respiratory:  Normal effort. Clear to auscultation, without wheezes or rhonchi. Trace crackles in bases. Cardiovascular: Normal rate, regular rhythm with normal S1/S2 without murmurs. Abdomen: Soft, non-tender, non-distended with normal bowel sounds. Protuberant  Musculoskeletal: No joint swelling or tenderness. Normal tone. No abnormal movements. Skin: Warm and dry. No rashes or lesions. Neurologic:  No focal sensory/motor deficits in the upper or lower extremities. Cranial nerves:  grossly non-focal 2-12. Psychiatric: Alert and oriented to self only, impaired insight  Capillary Refill: Brisk,< 3 seconds. Peripheral Pulses: +2 palpable, equal bilaterally.        Labs:   Recent Labs     11/25/21  0619   WBC 5.4   HGB 12.3*   HCT 36.5*        Recent Labs     11/25/21 0619      K 4.2      CO2 23   BUN 35*   CREATININE 1.2   CALCIUM 8.9     No results for input(s): AST, ALT, BILIDIR, BILITOT, ALKPHOS in the last 72 hours. No results for input(s): INR in the last 72 hours. No results for input(s): Jaimie Gazella in the last 72 hours. No results for input(s): PROCAL in the last 72 hours. Lab Results   Component Value Date    NITRU NEGATIVE 11/10/2021    WBCUA OBSCURED 11/10/2021    BACTERIA FEW 11/10/2021    RBCUA OBSCURED 11/10/2021    BLOODU LARGE 11/10/2021    SPECGRAV >=1.030 01/13/2020    SPECGRAV 1.013 01/14/2017    GLUCOSEU NEGATIVE 11/10/2021       Radiology (48 hours):  No results found. DVT prophylaxis:    [x] Lovenox  [] SCDs  [] SQ Heparin  [] Encourage ambulation   [] Already on Anticoagulation       Diet: ADULT DIET; Regular;  Moderately Thick (Honey)  Code Status: Limited  PT/OT: yes  Tele: no  IVF: no    Electronically signed by RUSSELL Shah on 11/27/2021 at 1:29 PM

## 2021-11-28 VITALS
SYSTOLIC BLOOD PRESSURE: 116 MMHG | HEIGHT: 72 IN | OXYGEN SATURATION: 98 % | DIASTOLIC BLOOD PRESSURE: 62 MMHG | BODY MASS INDEX: 26.01 KG/M2 | RESPIRATION RATE: 16 BRPM | HEART RATE: 74 BPM | WEIGHT: 192 LBS | TEMPERATURE: 98.1 F

## 2021-11-28 PROCEDURE — 6370000000 HC RX 637 (ALT 250 FOR IP): Performed by: INTERNAL MEDICINE

## 2021-11-28 PROCEDURE — 99239 HOSP IP/OBS DSCHRG MGMT >30: CPT | Performed by: PHYSICIAN ASSISTANT

## 2021-11-28 PROCEDURE — 96372 THER/PROPH/DIAG INJ SC/IM: CPT

## 2021-11-28 PROCEDURE — 6370000000 HC RX 637 (ALT 250 FOR IP): Performed by: PHYSICIAN ASSISTANT

## 2021-11-28 PROCEDURE — 6370000000 HC RX 637 (ALT 250 FOR IP): Performed by: STUDENT IN AN ORGANIZED HEALTH CARE EDUCATION/TRAINING PROGRAM

## 2021-11-28 RX ADMIN — AMLODIPINE BESYLATE 10 MG: 10 TABLET ORAL at 08:35

## 2021-11-28 RX ADMIN — LEVOTHYROXINE SODIUM 125 MCG: 0.12 TABLET ORAL at 06:34

## 2021-11-28 RX ADMIN — Medication 1000 MCG: at 08:35

## 2021-11-28 RX ADMIN — MICONAZOLE NITRATE: 20 POWDER TOPICAL at 10:15

## 2021-11-28 ASSESSMENT — PAIN SCALES - GENERAL: PAINLEVEL_OUTOF10: 0

## 2021-11-28 NOTE — DISCHARGE SUMMARY
Hospitalist Discharge Summary        Patient: Fercho Charles  YOB: 1926  MRN: [id number]   Acct: [de-identified]    Primary Care Physician: OLGA Jernigan - CNP    Admit date  11/10/2021    Discharge date:  11/28/2021 1:23 PM    Chief Complaint on presentation :-  Confusion     Discharge Assessment and Plan:-   #Acute metabolic encephalopathy (resolved) in setting of Dementia (progressive) likely 2/2 dehydration and possibly transient hypoxia.  Per prior notes, \"More information from the son Chey Wolf appears to show that pt was found in bath tub, with no water left in it, a stand alone heater was blaring and had the temperature of the bathroom to a staggering degree\". -also component of uncontrolled hypothyroidism (poor compliance) and B12 deficiency  -delirium precautions  -CT head with mod-severe volume loss -> c/w diagnosis of dementia (possible vascular component with atherosclerosis)  -MOCA assessment, pt scored 11/30.   -patient not safe for home alone, PT/OT on board recommend SNF. Son is in agreement with plan. Pre-cert denied; appeal denied. Pt going home with 24 hour care.       #Stage 2 VADIM on CKD IIIb (resolved): Cr 2.1 on baseline of 1.4-1.6.  - Avoid nephrotoxic agents and renally dose medications  - repeat bmp on 11/18 stable at 1.4 & repeat BMP on 11/25 creatinine 1.2.      #Asymptomatic bacteruria in setting of Hx of Chronic UTI/Incontinence: Likely colonized bacteria. No fevers or leukocytosis. Multiple previous cultures show E faecalis, Strep agalactiae, Staph aureus, and Staph epi. Received ceftriaxone on admission.   - D/c Abx as it does not appear to be a UTI at this time      #Hypothyroidism, uncontrolled: non-adherence to medication. .8, Free T4 0.56. Per pts daughter the patient has had iatrogenic destruction of the thyroid, but uncertain of exact details. This was also unconfirmed via chart review.   - Resume levothyroxine at 50mcg, increased to 100mcg on 11/18 as FT4 still 0.5  - FT4 0.68. Synthroid increased to 125mcg on 11/22. Repeat in 5-7 days. - recommend repeat TSH and t4 outpatient in 6-8 weeks     #B12 deficiency: in low 200s, starting on oral replacement, may be contributing to symptoms.       #HTN : Controlled with some lability.  - Continue amlodipine with holding parameters     #Hx of prostate cancer: s/p radiation in 2000.     #Hx of bladder stones: Noted. Follows with Dr. Garcia (urology) outpatient.     #Code status: Patient's daughter and son are POA. A long conversation with the son was had along with palliative and the decision has been made to change pt to Limited No x4. Initial H and P and Hospital course:-  Initial H&P \" Stefanie Jimenez is a 80 y.o. male with PMHx of prostate cancer status post TURP with history of UTIs, hypertension, hyperlipidemia, hypothyroidism, nephrolithiasis, and osteoarthritis who was brought into the emergency room here at [facility] by EMS on 11/10/2021 with complaints of about a 2-day history of altered mentation increased confusion decreased interaction and inability to care for himself like he normally did. At the time of evaluation and interview in the ER patient disoriented to time and unable to provide much information about why he is in the emergency room and what happened at home. He states that he is just unable to get it altogether for some reason. Per emergency staff notes, patient was brought in because his son realized that patient was not acting himself with increased confusion, decreased interaction and decreased energy, staying in the bathtub for 3 hours with no water despite being instructed by the son to call after he was done showering. Son also is unsure about whether patient has been taking his medications at home lately and whether he has been taking them correctly.   It appears that patient has been living alone but failing to care for herself and needs higher level of care at this time. Denies any nausea vomiting abdominal pain or diarrhea. No fevers or chills or sore throat or shortness of breath. No chest pain.     In the emergency room patient was afebrile 98.7, tachycardic 99, respirate of 16-18, blood pressure 150/98 with 90% oxygen saturation on room air. Laboratory vesication's were positive for an elevated serum creatinine of 2.1 from a baseline of 1.6, lactic acid of 1.9, pro-Red of 0.1, troponin of 0.057, and a significantly increased TSH of 126.8 with a low free T4 of 0.56. CBC was normal.  Urinalysis was strongly positive for UTI plus hyaline casts. Chest x-ray showed a left upper and lingular lobe pneumonia or infiltrate. Twelve-lead EKG with sinus rhythm and first-degree AV block but no acute ST segment elevation or depression. Patient received ceftriaxone 1 g IV x1 and 2.5 L IV fluid boluses. Patient was then admitted to our hospitalist service for further evaluation and treatment.     At the time of evaluation in the ER patient was laying in bed comfortably in no acute distress. Able to follow commands and answer simple questions and name his date of birth but disoriented and unable to provide any details about current or recent events. \"     11/16: Pt was medically ready to be discharged. Pt was moved to Med/Surg bed to await safe DC plan. 82/15-02/83: Pre-cert was denied and peer to peer was denied. Written appeal was denied. Family was told that the patient had to go home with 24 hour care on 11/26 evening. Pt's family had to make arrangements, they were ready for him on 11/28.      Physical Exam:-  Vitals:   Patient Vitals for the past 24 hrs:   BP Temp Temp src Pulse Resp SpO2   11/28/21 0826 116/62 98.1 °F (36.7 °C) Oral 74 16 98 %   11/28/21 0330 134/62 98.4 °F (36.9 °C) Oral 76 18 94 %   11/27/21 1930 (!) 141/58 98.4 °F (36.9 °C) Oral 80 16 92 %   11/27/21 1510 135/70 97.8 °F (36.6 °C) Oral 85 18 98 %     Weight:   Weight: 192 lb (87.1 kg)   24 hour intake/output:     Intake/Output Summary (Last 24 hours) at 11/28/2021 1323  Last data filed at 11/27/2021 1759  Gross per 24 hour   Intake 600 ml   Output --   Net 600 ml     General appearance: Pleasantly confused, no distress  Eyes:  PERRL. Conjunctivae/corneas clear. HENT: Head normal appearing. Nares normal. Oral mucosa moist.  Hearing intact. Neck: Supple, with full range of motion. Trachea midline. No gross JVD appreciated. Respiratory:  Normal effort. Clear to auscultation, without wheezes or rhonchi. Trace crackles in bases. Cardiovascular: Normal rate, regular rhythm with normal S1/S2 without murmurs. Abdomen: Soft, non-tender, non-distended with normal bowel sounds. Protuberant  Musculoskeletal: No joint swelling or tenderness. Normal tone. No abnormal movements. Skin: Warm and dry. No rashes or lesions. Neurologic:  No focal sensory/motor deficits in the upper or lower extremities. Cranial nerves:  grossly non-focal 2-12. Psychiatric: Alert and oriented to self only, impaired insight  Capillary Refill: Brisk,< 3 seconds. Peripheral Pulses: +2 palpable, equal bilaterally. Discharge Medications:-      Medication List      CONTINUE taking these medications    amLODIPine 10 MG tablet  Commonly known as: NORVASC  Take 1 tablet by mouth daily     levothyroxine 50 MCG tablet  Commonly known as: SYNTHROID  Take 1 tablet by mouth daily        STOP taking these medications    ciprofloxacin 500 MG tablet  Commonly known as: CIPRO             Labs :-  No results found for this or any previous visit (from the past 72 hour(s)).      Microbiology:    Blood culture #1:   Lab Results   Component Value Date    BC No growth-preliminary No growth  11/10/2021       Blood culture #2:No results found for: Yeimi Patel    Organism:  No results found for: LABGRAM    MRSA culture only:No results found for: [facility]    Urine culture:   Lab Results   Component Value Date    LABURIN  11/13/2021 No growth-preliminary Growth of Contaminants. The mixture of organisms present are not a common cause of urinary tract infections and probably represent skin gladys or distal urethral gladys. Lab Results   Component Value Date    ORG Growth of Contaminants 11/13/2021        Respiratory culture: No results found for: CULTRESP    Aerobic and Anaerobic :  No results found for: LABAERO  No results found for: LABANAE    Urinalysis:      Lab Results   Component Value Date    NITRU NEGATIVE 11/10/2021    WBCUA OBSCURED 11/10/2021    BACTERIA FEW 11/10/2021    RBCUA OBSCURED 11/10/2021    BLOODU LARGE 11/10/2021    SPECGRAV >=1.030 01/13/2020    SPECGRAV 1.013 01/14/2017    GLUCOSEU NEGATIVE 11/10/2021       Radiology:-  CT HEAD WO CONTRAST    Result Date: 11/11/2021  PROCEDURE: CT HEAD WO CONTRAST CLINICAL INFORMATION: altered mental status . TECHNIQUE: 2-D multiplanar noncontrast CT brain All CT scans at this facility use dose modulation, iterative reconstruction, and/or weight-based dosing when appropriate to reduce radiation dose to as low as reasonably achievable. COMPARISON: No prior study. FINDINGS: Generalized moderate to severe volume loss. Chronic small vessel ischemic disease changes. No hemorrhage. No acute infarction. Ventricles are normal. Marked calcific atherosclerosis of the vertebral arteries. Calcific atherosclerosis of the cavernous portions of the internal carotid arteries. Calvarium is intact. Visualized paranasal sinuses and mastoid air cells are clear. Deviation of the nasal septum. IMPRESSION:  No acute intracranial pathology **This report has been created using voice recognition software. It may contain minor errors which are inherent in voice recognition technology. ** Final report electronically signed by Dr. Arleth Kevin on 11/11/2021 12:32 AM    XR CHEST PORTABLE    Result Date: 11/10/2021  PROCEDURE: XR CHEST PORTABLE CLINICAL INFORMATION: altered mental status .  TECHNIQUE: Portable upright COMPARISON: 10/8/2020 FINDINGS: Heart size normal mediastinum is not widened. Infiltrates in the left upper lobe and left midlung. No effusions. Pulmonary vessels are not congested. EKG leads overlie the chest.     Left lung infiltrates. **This report has been created using voice recognition software. It may contain minor errors which are inherent in voice recognition technology. ** Final report electronically signed by Dr. Jojo Quintero on 11/10/2021 11:51 PM       Follow-up scheduled after discharge :-    in the next few days with Vielka Yu, APRN - CNP    Consultations during this hospital stay:-  [] NONE [] Cardiology  [] Nephrology  [] Hemo onco   [] GI   [] ID  [] Endocrine  [] Pulm    [] Neuro    [] Psych   [] Urology  [] ENT   [] G SURGERY   []Ortho    []CV surg    [] Palliative  [] Hospice [] Pain management   []    []TCU   [x] PT/OT  OTHERS:-    Disposition: home with HH   Condition at Discharge: Stable    Time Spent:- 45 minutes    Electronically signed by Elinor Fothergill, PA on 11/28/21 at 1:23 PM EST   Discharging Hospitalist

## 2021-11-28 NOTE — PROGRESS NOTES
Discharge teaching given to son, Pricilla Cockayne the son and POA declined [address] at this time.   Pt discharged with son, pt staying with a family member at discharge

## 2021-11-29 ENCOUNTER — TELEPHONE (OUTPATIENT)
Dept: FAMILY MEDICINE CLINIC | Age: 86
End: 2021-11-29

## 2021-11-29 NOTE — CARE COORDINATION
11/29/21, 10:29 AM EST    DISCHARGE PLANNING EVALUATION      Discharged home with family on 11/28, nursing notes that family and patient declined [facility], although HH was offered. 11/29/21, 10:30 AM EST    Patient goals/plan/ treatment preferences discussed by  and . Patient goals/plan/ treatment preferences reviewed with patient/ family. Patient/ family verbalize understanding of discharge plan and are in agreement with goal/plan/treatment preferences. Understanding was demonstrated using the teach back method. AVS provided by RN at time of discharge, which includes all necessary medical information pertaining to the patients current course of illness, treatment, post-discharge goals of care, and treatment preferences. Services After Discharge  Services At/After Discharge: None   IMM Letter  IMM Letter given to Patient/Family/Significant other/Guardian/POA/by[de-identified] Enid VALENCIA Case Manager.   IMM Letter date given[de-identified] 11/26/21  IMM Letter time given[de-identified] [id number]

## 2021-12-08 ENCOUNTER — NURSE ONLY (OUTPATIENT)
Dept: LAB | Age: 86
End: 2021-12-08

## 2021-12-09 LAB — PROSTATE SPECIFIC ANTIGEN: 1.13 NG/ML (ref 0–1)

## 2021-12-23 ENCOUNTER — TELEPHONE (OUTPATIENT)
Dept: FAMILY MEDICINE CLINIC | Age: 86
End: 2021-12-23

## 2022-01-01 ENCOUNTER — NURSE ONLY (OUTPATIENT)
Dept: LAB | Age: 87
End: 2022-01-01

## 2022-01-01 DIAGNOSIS — E03.9 HYPOTHYROIDISM, UNSPECIFIED TYPE: ICD-10-CM

## 2022-01-01 RX ORDER — LEVOTHYROXINE SODIUM 88 UG/1
88 TABLET ORAL DAILY
Qty: 30 TABLET | Refills: 2 | Status: SHIPPED | OUTPATIENT
Start: 2022-01-01

## 2022-01-11 ENCOUNTER — OFFICE VISIT (OUTPATIENT)
Dept: FAMILY MEDICINE CLINIC | Age: 87
End: 2022-01-11
Payer: MEDICARE

## 2022-01-11 VITALS
WEIGHT: 191 LBS | OXYGEN SATURATION: 95 % | HEART RATE: 88 BPM | RESPIRATION RATE: 16 BRPM | SYSTOLIC BLOOD PRESSURE: 130 MMHG | DIASTOLIC BLOOD PRESSURE: 70 MMHG | BODY MASS INDEX: 25.87 KG/M2 | HEIGHT: 72 IN | TEMPERATURE: 98.1 F

## 2022-01-11 DIAGNOSIS — G30.1 LATE ONSET ALZHEIMER'S DEMENTIA WITH BEHAVIORAL DISTURBANCE (HCC): ICD-10-CM

## 2022-01-11 DIAGNOSIS — N32.0 BLADDER NECK CONTRACTURE: ICD-10-CM

## 2022-01-11 DIAGNOSIS — F02.818 LATE ONSET ALZHEIMER'S DEMENTIA WITH BEHAVIORAL DISTURBANCE (HCC): ICD-10-CM

## 2022-01-11 DIAGNOSIS — E03.9 HYPOTHYROIDISM, UNSPECIFIED TYPE: ICD-10-CM

## 2022-01-11 DIAGNOSIS — F03.B0 MODERATE DEMENTIA WITHOUT BEHAVIORAL DISTURBANCE: ICD-10-CM

## 2022-01-11 DIAGNOSIS — N21.0 BLADDER STONE: ICD-10-CM

## 2022-01-11 DIAGNOSIS — Z01.818 PREOPERATIVE CLEARANCE: Primary | ICD-10-CM

## 2022-01-11 PROCEDURE — G8427 DOCREV CUR MEDS BY ELIG CLIN: HCPCS | Performed by: NURSE PRACTITIONER

## 2022-01-11 PROCEDURE — 1123F ACP DISCUSS/DSCN MKR DOCD: CPT | Performed by: NURSE PRACTITIONER

## 2022-01-11 PROCEDURE — 4040F PNEUMOC VAC/ADMIN/RCVD: CPT | Performed by: NURSE PRACTITIONER

## 2022-01-11 PROCEDURE — 99213 OFFICE O/P EST LOW 20 MIN: CPT | Performed by: NURSE PRACTITIONER

## 2022-01-11 PROCEDURE — 1036F TOBACCO NON-USER: CPT | Performed by: NURSE PRACTITIONER

## 2022-01-11 PROCEDURE — G8417 CALC BMI ABV UP PARAM F/U: HCPCS | Performed by: NURSE PRACTITIONER

## 2022-01-11 PROCEDURE — G8484 FLU IMMUNIZE NO ADMIN: HCPCS | Performed by: NURSE PRACTITIONER

## 2022-01-11 RX ORDER — CIPROFLOXACIN 250 MG/1
TABLET, FILM COATED ORAL
COMMUNITY
Start: 2021-12-08

## 2022-01-11 RX ORDER — RIVASTIGMINE 4.6 MG/24H
1 PATCH, EXTENDED RELEASE TRANSDERMAL DAILY
Qty: 30 PATCH | Refills: 3 | Status: SHIPPED | OUTPATIENT
Start: 2022-01-11 | End: 2022-05-09

## 2022-01-11 NOTE — PROGRESS NOTES
hCase Anne is a 80 y.o. male that presents for Pre-op Exam (no concerns)      Son accompanies pt today    Son is concerned with pt's memory and wants him tested  For dementia. Patient currently living with someone else, states he is forgetful , would like him on dementia medication, would saad the exelon patch. MMSE administered per ELLEN questionairre, results in chart. At the request of Dr. Monroe Soto presents for pre-operative evaluation for his surgery. Planned Surgery: cystolitholapaxy, balloon dilation and left sten placement. Patient Active Problem List    Diagnosis Date Noted    Prostate cancer ([facility]  recurrent 01/14/2017    Late onset Alzheimer's dementia with behavioral disturbance ([facility] [address] [facility] 17/87/6014    Metabolic encephalopathy 78/79/4902    Altered mental status     Asymptomatic bacteriuria     Chronic UTI 11/11/2021    Hypothyroidism 07/19/2019    S/P TURP 05/11/2019    Bladder neck contracture     History of prostate cancer     Normocytic anemia     Acute cystitis with hematuria     Essential hypertension        PREOPERATIVE FAMILY HISTORY  - He reports that he does not have a history of bad reaction to anesthesia. -he does not have a family history of bleeding problems such as hemophilia, or [place] disease, or von Willebrand disease. PREOPERATIVE ALLERGIES QUESTION:  Allergies   Allergen Reactions    Penicillins      Unsure-hasn't taken in over 40 yrs. he does not have a history of rash or swelling from exposure to rubber or latex.     PREOPERATIVE MEDICATION QUESTION:  Current Outpatient Medications   Medication Sig Dispense Refill    ciprofloxacin (CIPRO) 250 MG tablet take 1 tablet by mouth once daily      rivastigmine (EXELON) 4.6 MG/24HR Place 1 patch onto the skin daily 30 patch 3    levothyroxine (SYNTHROID) 50 MCG tablet Take 1 tablet by mouth daily 90 tablet 1    amLODIPine (NORVASC) 10 MG tablet Take 1 tablet by mouth daily 90 tablet 1     No current facility-administered medications for this visit. he has not been taking systemic glucocorticoid this past year    CARDIAC RISK FACTORS  he does not have a history of UA or MI within that past 30 days  he does not have decompensated CHF or significant valvular disease  he does not have a history of significant cardiac arrhythmia  he denies chest pain, exertional dyspnea, orthopnea, palpitations or LE edema. he can walk up 1 flight of stairs or 8 steps without stopping (4 METS)    he does not have a history of CAD  he does not have a history of CHF  he does not have a history of CVA or TIA  he does not have a history of DM requiring insulin  he does not have a history of Renal insufficiency (Cr > 2.0)    EKG reveals sinus rhythm with a rate of 92. There are not Q waves and are not ST segment changes. Hypothyroidism    HPI:  Currently treated for Hypothyroidism? Yes  Fatigue? No  Recent change in weight? No  Cold/Heat intolerance? No  Diarrhea/Constipation? No  Diaphoresis? No  Anxiety? No  Palpitations? No   Hair Loss? No    Lab Results   Component Value Date    .800 (H) 11/10/2021    T4FREE 0.68 (L) 11/22/2021             PREOPERATIVE REVIEW OF SYSTEMS:  he does not have a history of MRSA or VRE. he does not have a history of seizures. he does not have a history of phlebitis or blood clots in arms or legs. he does not have a history of sleep apnea. he  does not have a history of snoring loudly enough to be heard through a closed door. General/Constitutional:  Negative for fever, chills, fatigue, recent weight gain or loss. HEENT:  Negative for changes in vision, ear pain, nose pain, sore throat, dysphagia or odynophagia. Cardiovascular:  Negative for palpitations, chest pain, dyspnea on exertion, or orthopnea   Respiratory:  Negative for  cough, hemoptysis, dyspnea or wheezing.     Gastrointestinal:  Negative for abdominal pain, nausea, vomiting, diarrhea, constipation or changes in bowel habits. Genitourinary: Negative for dysuria or hematuria. No frequency, urgency, or hesitancy. Musculoskeletal: Negative for arthralgias, myalgias, or back pain. Neurological: Negative for dizziness, syncope, focal weakness, paresthesias or headaches. Psychiatric: Negative for depression, anxiety, SI/HI   Skin: Negative for acute skin lesions. ELLEN questionairre with a score of 8-10 consistent with severe dementia. PHYSICAL EXAM:  Blood pressure 130/70, pulse 88, temperature 98.1 °F (36.7 °C), temperature source Oral, resp. rate 16, height 6' (1.829 m), weight 191 lb (86.6 kg), SpO2 95 %. GEN: No acute distress  HEENT:  NCAT, PERRL, EOMI, Nares clear, turbinates pink, mucosa is moist.  Oropharynx  is clear. Hearing grossly intact. Dentition is normal for age. Neck: No lymphadenopathy or masses. Thyroid not palpable; no nodules or masses. Heart: RRR. S1 and S2 normal, no murmurs, clicks, gallops or rubs. No carotid bruits noted. Lungs:  CTAB,  No wheezing, ronchi, or rales. Normal symmetric air entry throughout both lung fields. Abdomen:  Soft, non tender, non distended. No rebound or guarding. No organomegaly. Extremities:  No gross deformity, erythema or edema of the lower extremities. Skin: No pathologic lesions or significant rash. Psych:  Affect appropriate. Thought process is normal without evidence of depression or psychosis. Good insight and appropriae interaction. Cognition and memory appear to be intact. Gabriel Freitas was seen today for pre-op exam.    Diagnoses and all orders for this visit:    Preoperative clearance  Pt medical accepatble risk for surgery  Labs form November reviewed.    EKG reviewed  clearnace given  Bladder stone    Bladder neck contracture    Hypothyroidism, unspecified type  -     TSH With Reflex Ft4; Future  Uncontrolled as pt had been non compliant with his medications  Will update labs  Son states he has been taking his pill everyday now. Late onset Alzheimer's dementia with behavioral disturbance (HCC)  -     rivastigmine (EXELON) 4.6 MG/24HR; Place 1 patch onto the skin daily    Moderate dementia without behavioral disturbance (HCC)  -     rivastigmine (EXELON) 4.6 MG/24HR; Place 1 patch onto the skin daily        Return if symptoms worsen or fail to improve. Http://[email or web address] org/article. aspx?hqqdnjgnl=585947  http://[email or web address] com/CardiacRisk_[email or web address]

## 2022-01-24 ENCOUNTER — NURSE ONLY (OUTPATIENT)
Dept: LAB | Age: 87
End: 2022-01-24

## 2022-01-24 DIAGNOSIS — E03.9 HYPOTHYROIDISM, UNSPECIFIED TYPE: ICD-10-CM

## 2022-01-24 LAB
T4 FREE: 0.77 NG/DL (ref 0.93–1.76)
TSH SERPL DL<=0.05 MIU/L-ACNC: 44.08 UIU/ML (ref 0.4–4.2)

## 2022-01-24 RX ORDER — LEVOTHYROXINE SODIUM 0.07 MG/1
75 TABLET ORAL DAILY
Qty: 90 TABLET | Refills: 0 | Status: SHIPPED | OUTPATIENT
Start: 2022-01-24 | End: 2022-04-14 | Stop reason: DRUGHIGH

## 2022-01-25 ENCOUNTER — TELEPHONE (OUTPATIENT)
Dept: FAMILY MEDICINE CLINIC | Age: 87
End: 2022-01-25

## 2022-01-25 NOTE — TELEPHONE ENCOUNTER
----- Message from OLGA Rivera CNP sent at 1/24/2022  4:15 PM EST -----  Let son know TSH has improved but still high now at 44 , down from 126, 4/2 is normal. I am increasing his synthroid to 75 mcg daily and then recheck labs in 8 weeks, orders placed

## 2022-02-17 NOTE — TELEPHONE ENCOUNTER
Discharge Summary       PATIENT ID: Rita Chris  MRN: [id number]   YOB: 1971    DATE OF ADMISSION: 2/14/2022  6:24 PM    DATE OF DISCHARGE: 02/17/22     PRIMARY CARE PROVIDER: Aura Grey MD     ATTENDING PHYSICIAN: Og Hoffman MD    DISCHARGING PROVIDER: Og Hoffman MD    To contact this individual call [phone] and ask the  to page. If unavailable ask to be transferred the Adult Hospitalist Department. CONSULTATIONS: IP CONSULT TO PULMONOLOGY    PROCEDURES/SURGERIES: * No surgery found *    DISCHARGE DIAGNOSES:   Suspected COPD exacerbation  AHRF,resolved  Smoker,urged to quit,she is motivated. [address] [facility],[address] COURSE:   Admission Summary:   From the H&P   Rita Chris is a 48 y.o. female with a pmhx RCC s/p left kidney resection, HTN, and hx nicotine dependence who presents with productive cough, and dyspnea. She states that her sx have been worsening since Christmas 2021. She was a 1.5 PPD smoker for 20 years prior to this. In the ED, she was hypoxic to 84%, with improvement to 99% on 4Lnc. Other VSS. Labs were significant for BUN 21, creatinine 1.04 (b/l 0.8), Rapid covid was negative, and probnp slightly elevated to 355. #Likely COPD with exacerbation. Acute hypoxic and hypercarbic respiratory failure. CT with diffuse groundglass opacity,diffuse bronchial wall thickeningg, and subsegmental endobronchial mucous plugging. Negative for pulmonary embolism. -ABG showed compensated respiratory acidosis, hypoxia. Hypoxia resolved, patient started on room air for more than 24 hours  -Patient was evaluated pulmonary recommended taper of steroid and inhaled steroid/LABA. Patient is discharged on albuterol inhaler, Advair and prednisone taper off and she will be following up with pulmonary associates as outpatient for PFT and further management.         #HTN  -continue home meds     #dyslipidemia  -continue home statin    DISCHARGE Pt son  States  That he has been trying to set up pill box , sons  Thea Kaplan he is unable to get over there everyday . Son is  Also frustrated. Pt wants to know about home health coming out to help with medications  And  Bathing pt . Son states that he had just had open heart and is unable to help his father everyday .    Son states pt has not taken meds  Correctly for  Over lindsay last4 to 6 weeks at least.     Pharmacy  [facility] DIAGNOSES / PLAN:          BMI: Body mass index is 32.12 kg/m². . This patient: Meets criteria for obesity given BMI >/= 30 and < 40 due to excess calories/nutritional. Weight loss and lifestyle modifications should be encouraged as an outpatient. PENDING TEST RESULTS:   At the time of discharge the following test results are still pending: none       NOTIFY YOUR PHYSICIAN FOR ANY OF THE FOLLOWING:   Fever over 101 degrees for 24 hours. Chest pain, shortness of breath, fever, chills, nausea, vomiting, diarrhea, change in mentation, falling, weakness, bleeding. Severe pain or pain not relieved by medications, as well as any other signs or symptoms that you may have questions about. FOLLOW UP APPOINTMENTS:    Follow-up Information     Follow up With Specialties Details Why Contact Info    [facility]   Call office to schedule appointment. You need pulmonary fuction test and follow up for further [address] [address]  [phone]             DIET: Regular Diet and Cardiac Diet    ACTIVITY: Activity as tolerated        DISCHARGE MEDICATIONS:  Discharge Medication List as of 2/17/2022 12:16 PM      START taking these medications    Details   guaiFENesin (ROBITUSSIN) 100 mg/5 mL liquid Take 5 mL by mouth every four (4) hours as needed for Cough., Normal, Disp-1 Each, R-0      predniSONE (DELTASONE) 10 mg tablet Take 40 mg by mouth daily (with breakfast) for 3 days, THEN 30 mg daily (with breakfast) for 3 days, THEN 20 mg daily (with breakfast) for 3 days, THEN 10 mg daily (with breakfast) for 3 days. , Normal, Disp-30 Tablet, R-0      fluticasone propion-salmeteroL (Advair Diskus) 500-50 mcg/dose diskus inhaler Take 1 Puff by inhalation every twelve (12) hours. , Normal, Disp-60 Each, R-0         CONTINUE these medications which have NOT CHANGED    Details   albuterol (PROVENTIL HFA, VENTOLIN HFA, PROAIR HFA) 90 mcg/actuation inhaler Take 2 Puffs by inhalation every four (4) hours as needed for Wheezing for up to 360 days. , Normal, Disp-3 Each, R-5      nicotine (NICODERM CQ) 7 mg/24 hr 1 Patch by TransDERmal route every twenty-four (24) hours. , Normal, Disp-28 Patch, R-5      cholecalciferol (VITAMIN D3) (5000 Units /125 mcg) capsule TAKE 1 CAPSULE EVERY DAY, Normal, Disp-90 Capsule, R-1      metoprolol tartrate (LOPRESSOR) 50 mg tablet TAKE 1 TABLET BY MOUTH  DAILY, Normal, Disp-90 Tablet, R-1      hydroCHLOROthiazide (HYDRODIURIL) 25 mg tablet TAKE 1 TABLET BY MOUTH EVERY DAY, Normal, Disp-90 Tablet, R-1      rosuvastatin (CRESTOR) 20 mg tablet Take 1 Tablet by mouth nightly., Normal, Disp-90 Tablet, R-3      ferrous sulfate 325 mg (65 mg iron) tablet Take 2 Tablets by mouth daily. , Normal, Disp-180 Tablet, R-3DX Code Needed  . nystatin (MYCOSTATIN) powder Apply  to affected area four (4) times daily. , Normal, Disp-60 g, R-0      aspirin delayed-release 81 mg tablet Take  by mouth daily. , Historical Med      azelastine (OPTIVAR) 0.05 % ophthalmic solution Administer 1 Drop to both eyes two (2) times a day. Use in affected eye(s), Normal, Disp-6 mL, R-0      hydrOXYzine HCL (ATARAX) 50 mg tablet TAKE 1 TABLET BY MOUTH EVERY DAY AS NEEDED, Normal, Disp-90 Tablet, R-1DX Code Needed  . senna-docusate (PERICOLACE) 8.6-50 mg per tablet Take 1 Tab by mouth daily. , Normal, Disp-90 Tab, R-1         STOP taking these medications       methylPREDNISolone (MEDROL DOSEPACK) 4 mg tablet Comments:   Reason for Stopping:               DISPOSITION:   x with Home With:   OT  PT  HH  RN       Long term SNF/Inpatient Rehab    Independent/assisted living    Hospice    Other:       PATIENT CONDITION AT DISCHARGE:     Functional status    Poor     Deconditioned    x tox Independent      Cognition   x  Lucid     Forgetful     Dementia      Catheters/lines (plus indication)    [place]     PICC     PEG    x None      Code status   x  Full code     DNR      PHYSICAL EXAMINATION AT DISCHARGE:  General:          Alert, cooperative, no distress, appears stated age. HEENT:           Atraumatic, anicteric sclerae, pink conjunctivae                          No oral ulcers, mucosa moist, throat clear, dentition fair  Neck:               Supple, symmetrical  Lungs:              She has mild end expiratory wheezing rhonchi, not tachypneic or no evidence of respiratory distress. Chest wall:      No tenderness  No Accessory muscle use. Heart:              Regular  rhythm,  No  murmur   No edema  Abdomen:        Soft, non-tender. Not distended. Bowel sounds normal  Extremities:     No cyanosis. No clubbing,                            Skin turgor normal, Capillary refill normal  Skin:                Not pale. Not Jaundiced  No rashes   Psych:             Not anxious or agitated.   Neurologic:      Alert, moves all extremities, answers questions appropriately and responds to commands       CHRONIC MEDICAL DIAGNOSES:  Problem List as of 2/17/2022 Date Reviewed: 11/17/2021          Codes Class Noted - Resolved    Acute respiratory failure ([facility] ICD-10-CM: J96.00  ICD-9-CM: 518.81  2/16/2022 - Present        COPD exacerbation ([facility] ICD-10-CM: J44.1  ICD-9-CM: 491.21  2/16/2022 - Present        Hypoxia ICD-10-CM: R09.02  ICD-9-CM: 799.02  2/14/2022 - Present        Hypertension (Chronic) ICD-10-CM: I10  ICD-9-CM: 401.9  2/20/2019 - Present        TIA due to embolism [facility]) ICD-10-CM: G45.9, I74.9  ICD-9-CM: 435.9, 444.9  6/1/2013 - Present    Overview Signed 4/2/2019  8:42 AM by Luke Banda MD     patient reported             Cholelithiasis ICD-10-CM: K80.20  ICD-9-CM: 574.20  12/6/2012 - Present        Renal carcinoma, left ([facility] ICD-10-CM: C64.2  ICD-9-CM: 189.0  1/1/2011 - Present    Overview Signed 4/2/2019  8:40 AM by Luke Banda MD     partial left kidney resection                   Greater than 40 minutes were spent with the patient on counseling and coordination of care    Signed: Candy Hernandez MD  2/17/2022  12:10 PM

## 2022-02-28 NOTE — TELEPHONE ENCOUNTER
Grisel Mcguire is a 24 year old, nulligravid who is here today for her annual exam. She reports her periods are  occuring regularly, lasting 3-7 days  She is on ocs  for contraception. . She declines  STD screening. She has no plans on starting her family for several years, is interested in iud. She denies any vaginal discharge or irritation.  she is sexually active  Has had full gardasil vaccine series     Past obstetrical history: nulligravid,      Past gynecologic history, menarche at age 13, no previous STD's, no previous abnormal pap smears.     Past medical, surgical, social, and family history is reviewed and updated, denies fam hx of  ovarian or colon ca.  has a paternal aunt with postmenopausal breast ca.   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      Medications and allergies are reviewed.      Review of Systems:   Eye Problem(s):glasses or contacts        Physical Exam,   Visit Vitals  /82   Ht 5' 5\" (1.651 m)   Wt 101.4 kg (223 lb 8.7 oz)   LMP 02/15/2022   BMI 37.20 kg/m²     Pleasant overweight female   NECK: no masses or thyromegaly   LUNGS: clear to auscultation  CARDIAC: regular rate and rhythm, no murmurs  BREASTS: no masses, skin retractions, or nipple discharge noted bilaterally, no supraclavicular or axillary lymph nodes noted bilaterally.  ABDOMEN: soft, non-tender, no masses  EXTERNAL GENITALIA: no lesions or abnormalities noted  Vagina no significant dc  Cervix appears nulliparous without lesion  Uterus is av, nt, not enlarged  No adnexal masses or tenderness  URETHRA/CLITORIS: no abnormalities noted  Internal exam deferred.   EXTREMITIES: without cyanosis, edema or varicosity      Assessment: Normal gyn exam, declines std screen, doing well on oral contraceptive, interested in iud     Plan:   Thin prep pap smear done  ocs for contraception for now  We discussed usual  Pt notified. And states he has been taking the 100mcg and only didn't take it for 1 day but took it again yesterday. States he has been taking it with his other meds and been doing this for years. I encouraged pt to take it alone then wait a hr before he takes his other meds and then eats. Pt states he will try. Pt states his son picked up a new prescription of his synthroid yesterday so he isnt out.  Lab orders already mailed out by [facility] yesterday 1/15/2020 side effects of IUD to include irregular bleeding and pelvic cramping for the first few weeks after insertion. We discussed more rare but more significant complications such as device expulsion/migration/perforation. Discussed perforation may require laparoscopy for removal. Discussed risks of bleeding, infection, and pain. We discussed the insertion procedure. All questions answered.   kareem and carole brochures given, did discuss paraguard, but she is most interested in the progesterone based iuds.    rtc for insertion if desired.

## 2022-03-08 ENCOUNTER — CARE COORDINATION (OUTPATIENT)
Dept: CARE COORDINATION | Age: 87
End: 2022-03-08

## 2022-03-08 NOTE — CARE COORDINATION
Tray Cornell received a call from Jacinda Zeng who is the son of Meg Gamboa. I have worked with Meg Gamboa in the past.  Jacinda Karri is now at the point that he would like to pursue Medicaid for Meg Gamboa. Jacinda Zeng was agreeable to [facility] referral for support. Could you please reach out to Jacinda Zeng, the son to provide this support. He can be reached on his cell phone at [phone]. He states if he doesn't answer, he doesn't have voicemail set up but he will call you back. Thank you!

## 2022-03-08 NOTE — CARE COORDINATION
Ambulatory Care Coordination Note  3/8/2022  CM Risk Score: 0  Charlson 10 Year Mortality Risk Score: 100%     ACC: Kelby Brown RN    Summary Note: Received a call from son, Aiden De Los Santos regarding Elena Holly overall health and well being. I have worked with Aiden De Los Santos in the past and he kept my number. Reaching out to [facility] for support with Medicaid process. Aiden De Los Santos states Stehpanie Camargo has moved in with a caregiver/family friend in [place] who is helping take care of him. She is asking for additional [place] support. I informed Doreen Rain could locate a [place] agency in [place] to place referral.  This will be private pay option due to no skilled service. Aiden De Los Sanots states he would like to put [place] on hold until Medicaid process started. I informed Doreen Rain could place a referral with [facility] Florence to assist with this process and he was in agreement. Plan  -provide education to help manage chronic conditions  -place referral with [facility] Florence for additional support with Medicaid process  -goal is to receive Passport support once approved for Medicaid, this will allow for the [place] support needed by caregiver  -reinforce fall education and prevention to reduce risk for falls  General Assessment    Do you have any symptoms that are causing concern?: Yes  Progression since Onset: Gradually Worsening  Reported Symptoms: Other (Comment: confusion, dementia)           Ambulatory Care Coordination Assessment    Care Coordination Protocol  Program Enrollment: Complex Care  Referral from Primary Care Provider: No  Week 1 - Initial Assessment     Do you have all of your prescriptions and are they filled?: Yes  Barriers to medication adherence: Forgets to take  Are you able to afford your medications?: Yes  How often do you have trouble taking your medications the way you have been told to take them?: Sometimes I take them as prescribed.      Do you have Home O2 Therapy?: No      Ability to seek help/take action for Emergent Urgent situations i.e. fire, crime, inclement weather or health crisis. : Dependent  Ability to ambulate to restroom: Dependent  Ability handle personal hygeine needs (bathing/dressing/grooming): Dependent  Ability to manage Medications: Dependent  Ability to prepare Food Preparation: Dependent  Ability to maintain home (clean home, laundry): Dependent  Ability to drive and/or has transportation: Dependent  Ability to do shopping: Dependent  Ability to manage finances: Dependent  Is patient able to live independently?: Yes     Current Housing: Private Residence        Per the Fall Risk Screening, did the patient have 2 or more falls or 1 fall with injury in the past year?: No  How often do you think you are about to fall and you do NOT fall? For example, you grab something to stabilize yourself or hold onto a wall/furniture?: Occasionally  Use of a Mobility Aid: Yes  Issues with feet or shoes like numbness, edema, shoes not fitting: No  Changes in vision, poor vision or poor lighting in environment: No  Dizziness: No     Frequent urination at night?: Yes  Do you use rails/bars?: No  Do you have a non-slip tub mat?: No     Are you experiencing loss of meaning?: No  Are you experiencing loss of hope and peace?: No     Thinking about your patient's physical health needs, are there any symptoms or problems (risk indicators) you are unsure about that require further investigation?: Moderate to severe symptoms or problems that impact on daily life   Are the patients physical health problems impacting on their mental well-being?: Moderate to severe impact upon mental well-being and preventing enjoyment of usual activities   Are there any problems with your patients lifestyle behaviors (alcohol, drugs, diet, exercise) that are impacting on physical or mental well-being?: Moderate to severe impact on well-being, preventing enjoyment of usual activities   Do you have any other concerns about your patients mental well-being?  How would you rate their severity and impact on the patient?: Moderate to severe problems that interfere with function   How would you rate their home environment in terms of safety and stability (including domestic violence, insecure housing, neighbor harassment)?: Consistently safe, supportive, stable, no identified problems   How do daily activities impact on the patient's well-being? (include current or anticipated unemployment, work, caregiving, access to transportation or other): Some general dissatisfaction but no concern   How would you rate their social network (family, work, friends)?: Adequate participation with social networks   How would you rate their financial resources (including ability to afford all required medical care)?: [address] insecure, some resource challenges   How wells does the patient now understand their health and well-being (symptoms, signs or risk factors) and what they need to do to manage their health?: Poor understanding with significant impact on ability to manage health   How well do you think your patient can engage in healthcare discussions? (Barriers include language, deafness, aphasia, alcohol or drug problems, learning difficulties, concentration): Some difficulties in communication with or without moderate barriers   Do other services need to be involved to help this patient?: Other care/services not in place and required   Are current services involved with this patient well-coordinated? (Include coordination with other services you are now recommendation): Required care/services missing and/or fragmented   Suggested Interventions and Community Resources  Fall Risk Prevention:  In Process Home Health Services: Declined   Other Services or Interventions: Medicaid application process starting   Medication Assistance Program: Not Started   Medi Set or Pill Pack: Completed   Registered Dietician: Not Started   Senior Services: [address] [place] [address] Work: In Process                  Prior to Admission medications    Medication Sig Start Date End Date Taking? Authorizing Provider   levothyroxine (SYNTHROID) 75 MCG tablet Take 1 tablet by mouth daily 1/24/22   OLGA Juarez CNP   ciprofloxacin (CIPRO) 250 MG tablet take 1 tablet by mouth once daily 12/8/21   Historical Provider, MD   rivastigmine (EXELON) 4.6 MG/24HR Place 1 patch onto the skin daily 1/11/22   OLGA Juarez CNP   amLODIPine (NORVASC) 10 MG tablet Take 1 tablet by mouth daily 9/14/20   OLGA Juarez CNP       No future appointments.

## 2022-03-08 NOTE — CARE COORDINATION
Brandon, I have re enrolled Opal Wright back into Care Coordination to provide support and education to son and family members. Goal is to assist with Medicaid process to allow for additional support. Opal Wright has moved in with family friend who is serving as his caregiver in [place]. I am involving [facility] Haro to assist with Medicaid process. Please approve Plan of Care using smart phrase . Ccplanofcare.   Thank you

## 2022-03-10 ENCOUNTER — CARE COORDINATION (OUTPATIENT)
Dept: CARE COORDINATION | Age: 87
End: 2022-03-10

## 2022-03-10 NOTE — CARE COORDINATION
Called pt son to discuss needs. He was unavailable at the time of my call and vm is not set up. Will try again later.

## 2022-03-11 ENCOUNTER — CARE COORDINATION (OUTPATIENT)
Dept: CARE COORDINATION | Age: 87
End: 2022-03-11

## 2022-03-11 NOTE — CARE COORDINATION
I received a call back from pt son Demetria Blake, he stated (Demetria Blake)  is \"currently in the hospital with vertigo,\"   and thought I could go see him in. Unaware I work remotely.

## 2022-03-16 ENCOUNTER — CARE COORDINATION (OUTPATIENT)
Dept: CARE COORDINATION | Age: 87
End: 2022-03-16

## 2022-03-16 NOTE — CARE COORDINATION
Ambulatory Care Coordination Note  3/16/2022  CM Risk Score: 0  Charlson 10 Year Mortality Risk Score: 100%     ACC: Erik Rivers RN    Summary Note: Spoke with Michael Richmond, son. States Eric Monsalve is stable for this review. Has follow up appt with urology in April. Continues to live with care giver in [place]. Kory Crsepo has been in contact with assist with Medicaid process  Bill's plan is to have his sisters help with this process as he has his own health issues that require his attention. [facility] states they have number to call to get Medicaid process going    Plan  -collaborate with Kory Crespo as needed  -assist with setting up additional support in the home as needed  -assist with Medicaid process as needed  -reinforce use of PCP office for issues that can be managed in office to prevent unnecessary utilization  General Assessment    Do you have any symptoms that are causing concern?: No               Care Coordination Interventions    Program Enrollment: Complex Care  Referral from Primary Care Provider: No  Suggested Interventions and Community Resources  Fall Risk Prevention: In Process  Home Health Services: Declined  Medication Assistance Program: Not Started  Medi Set or Pill Pack: Completed  Registered Dietician: Not Started  Senior Services: [facility]  Social Work: In Process  Other Services or Interventions: Medicaid application process starting         Goals Addressed                    This Visit's Progress      Conditions and Symptoms (pt-stated)   On track      I will schedule office visits, as directed by my provider. I will keep my appointment or reschedule if I have to cancel. I will notify my provider of any barriers to my plan of care. I will notify my provider of any symptoms that indicate a worsening of my condition.     Barriers: impairment:  cognitive  Plan for overcoming my barriers: family, ACM, caregiver and LISW support  Confidence: 5/10  Anticipated Goal Completion Date: 6/8/22              Prior to Admission medications    Medication Sig Start Date End Date Taking? Authorizing Provider   levothyroxine (SYNTHROID) 75 MCG tablet Take 1 tablet by mouth daily 1/24/22   OLGA Jamison CNP   ciprofloxacin (CIPRO) 250 MG tablet take 1 tablet by mouth once daily 12/8/21   Historical Provider, MD   rivastigmine (EXELON) 4.6 MG/24HR Place 1 patch onto the skin daily 1/11/22   OLGA Jamison CNP   amLODIPine (NORVASC) 10 MG tablet Take 1 tablet by mouth daily 9/14/20   OLGA Jamison CNP       No future appointments.

## 2022-03-30 ENCOUNTER — CARE COORDINATION (OUTPATIENT)
Dept: CARE COORDINATION | Age: 87
End: 2022-03-30

## 2022-03-30 NOTE — CARE COORDINATION
Called to discuss needed support with pt son Sarmad Chappell.  He was unavailable at the time of my call so I will wait for a return call

## 2022-04-08 ENCOUNTER — CARE COORDINATION (OUTPATIENT)
Dept: CARE COORDINATION | Age: 87
End: 2022-04-08

## 2022-04-08 NOTE — CARE COORDINATION
Ambulatory Care Coordination Note  4/8/2022  CM Risk Score: 0  Charlson 10 Year Mortality Risk Score: 100%     ACC: Nahomy Aranda, ANNIE    Summary Note: Spoke with son Gary Mark for continued Care Coordination. Was not able to talk long as he was in a meeting. States some health issues and his sisters being out of town threw off some of the planning with Medicaid. He continues to work with EDGAR Caldwell to assist as needed    Plan  -continue to work with family to ensure no new barriers  -assist with Medicaid process as needed  -collaborate with Marilynn Hurtado as needed  -reinforce importance of early symptom recognition and reporting to prevent exacerbation and unnecessary hospitalizations  General Assessment                   Care Coordination Interventions    Program Enrollment: Complex Care  Referral from Primary Care Provider: No  Suggested Interventions and Community Resources  Fall Risk Prevention: In Process  Home Health Services: Declined  Medication Assistance Program: Not Started  Medi Set or Pill Pack: Completed  Registered Dietician: Not Started  Senior Services: [address]  Social Work: In Process  Other Services or Interventions: Medicaid application process starting         Goals Addressed    None         Prior to Admission medications    Medication Sig Start Date End Date Taking? Authorizing Provider   levothyroxine (SYNTHROID) 75 MCG tablet Take 1 tablet by mouth daily 1/24/22   OLGA Li CNP   ciprofloxacin (CIPRO) 250 MG tablet take 1 tablet by mouth once daily 12/8/21   Historical Provider, MD   rivastigmine (EXELON) 4.6 MG/24HR Place 1 patch onto the skin daily 1/11/22   OLGA Li CNP   amLODIPine (NORVASC) 10 MG tablet Take 1 tablet by mouth daily 9/14/20   OLGA Li CNP       No future appointments.

## 2022-04-13 ENCOUNTER — HOSPITAL ENCOUNTER (OUTPATIENT)
Dept: GENERAL RADIOLOGY | Age: 87
Discharge: HOME | End: 2022-04-13
Payer: MEDICARE

## 2022-04-13 ENCOUNTER — HOSPITAL ENCOUNTER (OUTPATIENT)
Age: 87
Discharge: HOME | End: 2022-04-13
Payer: MEDICARE

## 2022-04-13 ENCOUNTER — NURSE ONLY (OUTPATIENT)
Dept: LAB | Age: 87
End: 2022-04-13

## 2022-04-13 DIAGNOSIS — Z00.00 ROUTINE GENERAL MEDICAL EXAMINATION AT A HEALTH CARE FACILITY: ICD-10-CM

## 2022-04-13 DIAGNOSIS — N21.0 BLADDER STONE: ICD-10-CM

## 2022-04-13 DIAGNOSIS — E03.9 HYPOTHYROIDISM, UNSPECIFIED TYPE: ICD-10-CM

## 2022-04-13 DIAGNOSIS — Z13.220 SCREENING CHOLESTEROL LEVEL: ICD-10-CM

## 2022-04-13 LAB
ANION GAP SERPL CALCULATED.3IONS-SCNC: 14 MEQ/L (ref 8–16)
BUN BLDV-MCNC: 21 MG/DL (ref 7–22)
CALCIUM SERPL-MCNC: 9 MG/DL (ref 8.5–10.5)
CHLORIDE BLD-SCNC: 105 MEQ/L (ref 98–111)
CHOLESTEROL, TOTAL: 255 MG/DL (ref 100–199)
CO2: 25 MEQ/L (ref 23–33)
CREAT SERPL-MCNC: 1.4 MG/DL (ref 0.4–1.2)
GFR SERPL CREATININE-BSD FRML MDRD: 47 ML/MIN/1.73M2
GLUCOSE BLD-MCNC: 95 MG/DL (ref 70–108)
HDLC SERPL-MCNC: 56 MG/DL
LDL CHOLESTEROL CALCULATED: 169 MG/DL
POTASSIUM SERPL-SCNC: 4.1 MEQ/L (ref 3.5–5.2)
SODIUM BLD-SCNC: 144 MEQ/L (ref 135–145)
T4 FREE: 0.97 NG/DL (ref 0.93–1.76)
TRIGL SERPL-MCNC: 150 MG/DL (ref 0–199)
TSH SERPL DL<=0.05 MIU/L-ACNC: 27.99 UIU/ML (ref 0.4–4.2)

## 2022-04-13 PROCEDURE — 74018 RADEX ABDOMEN 1 VIEW: CPT

## 2022-04-14 ENCOUNTER — CARE COORDINATION (OUTPATIENT)
Dept: CARE COORDINATION | Age: 87
End: 2022-04-14

## 2022-04-14 ENCOUNTER — TELEPHONE (OUTPATIENT)
Dept: FAMILY MEDICINE CLINIC | Age: 87
End: 2022-04-14

## 2022-04-14 DIAGNOSIS — E03.9 HYPOTHYROIDISM, UNSPECIFIED TYPE: Primary | ICD-10-CM

## 2022-04-14 RX ORDER — LEVOTHYROXINE SODIUM 88 UG/1
88 TABLET ORAL DAILY
Qty: 30 TABLET | Refills: 2 | Status: SHIPPED | OUTPATIENT
Start: 2022-04-14 | End: 2022-11-02 | Stop reason: SDUPTHER

## 2022-04-14 NOTE — CARE COORDINATION
Attempted to reach pt son to discuss concerns. Unavailable at the time of my call and vm is not set up. Hopefully he will return my call.

## 2022-04-14 NOTE — TELEPHONE ENCOUNTER
----- Message from Felipe Addison, DO sent at 4/14/2022  6:42 AM EDT -----  Please let pt/family know that thyroid is improving but remains underactive. Recommend we inc synthroid to 88mcg from 75mcg  Repeat labs in 6 wks, non-fasting is fine. BMP overall stable, make sure staying well hydrated. Cholesterol is appropriate for age. Let me know if questions, thanks!

## 2022-04-14 NOTE — TELEPHONE ENCOUNTER
Pt's son called and was advised of results and lab orders. He will  the Rx. He voiced understanding with no questions.

## 2022-04-15 ENCOUNTER — CARE COORDINATION (OUTPATIENT)
Dept: CARE COORDINATION | Age: 87
End: 2022-04-15

## 2022-04-29 ENCOUNTER — CARE COORDINATION (OUTPATIENT)
Dept: CARE COORDINATION | Age: 87
End: 2022-04-29

## 2022-04-29 NOTE — CARE COORDINATION
Brandon, I have been working with Tye Riley and his family on Care Coordination program to provide support and education to help manage chronic conditions. He has met those goals and all education has been completed with no new barriers noted. I would like to graduate him from Care Coordination at this time pending PCP approval.  Do you approve of this discharge? Please advise. Thank you.

## 2022-04-29 NOTE — CARE COORDINATION
Ambulatory Care Coordination Note  4/29/2022  CM Risk Score: 0  Charlson 10 Year Mortality Risk Score: 100%     ACC: Wilfredo Arora, RN    Summary Note: Spoke with son, Darrius Rivas for continued Care Coordination follow up call  Josse Hector is doing well  Continues to have support for him for adl care  Has not yet started the Medicaid process at this time  Alejo Pelletier has been following up as well  All education completed  No new barriers present  Darrius Rivas has contact information for myself and Antoine Soares for any assistance needed  Will graduate at this time        Care Coordination Interventions    Program Enrollment: Complex Care  Referral from Primary Care Provider: No  Suggested Interventions and Community Resources  Fall Risk Prevention: In Process  Home Health Services: Declined  Medication Assistance Program: Not Started  Medi Set or Pill Pack: Completed  Registered Dietician: Not Started  Senior Services: [facility]  Social Work: In Process  Other Services or Interventions: Medicaid application process starting         Goals Addressed                    This Visit's Progress      COMPLETED: Conditions and Symptoms (pt-stated)         I will schedule office visits, as directed by my provider. I will keep my appointment or reschedule if I have to cancel. I will notify my provider of any barriers to my plan of care. I will notify my provider of any symptoms that indicate a worsening of my condition. Barriers: impairment:  cognitive  Plan for overcoming my barriers: family, ACM, caregiver and LISW support  Confidence: 5/10  Anticipated Goal Completion Date: 6/8/22              Prior to Admission medications    Medication Sig Start Date End Date Taking?  Authorizing Provider   levothyroxine (SYNTHROID) 88 MCG tablet Take 1 tablet by mouth daily 4/14/22   Claude Pilot, DO   ciprofloxacin (CIPRO) 250 MG tablet take 1 tablet by mouth once daily 12/8/21   Historical Provider, MD   rivastigmine (EXELON) 4.6 MG/24HR Place 1 patch onto the skin daily 1/11/22   OLGA Tinajero CNP   amLODIPine (NORVASC) 10 MG tablet Take 1 tablet by mouth daily 9/14/20   OLGA Tinajero CNP       No future appointments.

## 2022-05-06 DIAGNOSIS — G30.1 LATE ONSET ALZHEIMER'S DEMENTIA WITH BEHAVIORAL DISTURBANCE (HCC): ICD-10-CM

## 2022-05-06 DIAGNOSIS — F02.818 LATE ONSET ALZHEIMER'S DEMENTIA WITH BEHAVIORAL DISTURBANCE (HCC): ICD-10-CM

## 2022-05-06 DIAGNOSIS — F03.B0 MODERATE DEMENTIA WITHOUT BEHAVIORAL DISTURBANCE: ICD-10-CM

## 2022-05-09 RX ORDER — RIVASTIGMINE 4.6 MG/24H
PATCH, EXTENDED RELEASE TRANSDERMAL
Qty: 30 PATCH | Refills: 3 | Status: SHIPPED | OUTPATIENT
Start: 2022-05-09 | End: 2022-09-07

## 2022-06-06 ENCOUNTER — TELEPHONE (OUTPATIENT)
Dept: FAMILY MEDICINE CLINIC | Age: 87
End: 2022-06-06

## 2022-06-06 NOTE — TELEPHONE ENCOUNTER
----- Message from Aston Hauser sent at 6/6/2022  2:43 PM EDT -----  Subject: Message to Provider    QUESTIONS  Information for Provider? Traci from [facility], needs a call back   to know when pateint was last seen. Traci not on hippa. Can be reached #   [phone]  ---------------------------------------------------------------------------  --------------  Félix Muse INFO  What is the best way for the office to contact you? OK to leave message on   voicemail  Preferred Call Back Phone Number?  [phone]  ---------------------------------------------------------------------------  --------------  SCRIPT ANSWERS  undefined

## 2022-08-24 ENCOUNTER — NURSE ONLY (OUTPATIENT)
Dept: LAB | Age: 87
End: 2022-08-24

## 2022-08-24 ENCOUNTER — HOSPITAL ENCOUNTER (OUTPATIENT)
Age: 87
Discharge: HOME | End: 2022-08-24
Payer: MEDICARE

## 2022-08-24 ENCOUNTER — HOSPITAL ENCOUNTER (OUTPATIENT)
Dept: GENERAL RADIOLOGY | Age: 87
Discharge: HOME | End: 2022-08-24
Payer: MEDICARE

## 2022-08-24 DIAGNOSIS — N21.0 BLADDER STONE: ICD-10-CM

## 2022-08-24 PROCEDURE — 74018 RADEX ABDOMEN 1 VIEW: CPT

## 2022-08-25 LAB — PROSTATE SPECIFIC ANTIGEN: 2.65 NG/ML (ref 0–1)

## 2022-09-07 DIAGNOSIS — G30.1 LATE ONSET ALZHEIMER'S DEMENTIA WITH BEHAVIORAL DISTURBANCE (HCC): ICD-10-CM

## 2022-09-07 DIAGNOSIS — F02.818 LATE ONSET ALZHEIMER'S DEMENTIA WITH BEHAVIORAL DISTURBANCE (HCC): ICD-10-CM

## 2022-09-07 DIAGNOSIS — F03.B0 MODERATE DEMENTIA WITHOUT BEHAVIORAL DISTURBANCE: ICD-10-CM

## 2022-09-07 RX ORDER — RIVASTIGMINE 4.6 MG/24H
PATCH, EXTENDED RELEASE TRANSDERMAL
Qty: 30 PATCH | Refills: 3 | Status: SHIPPED | OUTPATIENT
Start: 2022-09-07

## 2022-10-04 ENCOUNTER — PATIENT MESSAGE (OUTPATIENT)
Dept: FAMILY MEDICINE CLINIC | Age: 87
End: 2022-10-04

## 2022-10-04 NOTE — TELEPHONE ENCOUNTER
Future Appointments   Date Time Provider Bernadette Duffy   10/12/2022  4:00 PM Shanna Chahal APRN - CNP [facility] [address]     Patient has been informed and voiced understanding

## 2022-10-04 NOTE — TELEPHONE ENCOUNTER
Would advise f/u apt with Jan to re-evaluate  Last seen January 2022  Please help get setup  Let me know if questions, thanks!

## 2022-11-07 ENCOUNTER — TELEPHONE (OUTPATIENT)
Dept: FAMILY MEDICINE CLINIC | Age: 87
End: 2022-11-07

## 2022-11-07 NOTE — TELEPHONE ENCOUNTER
Keyana @ [facility]  home informed she will email death certificate for signature

## 2022-11-07 NOTE — TELEPHONE ENCOUNTER
----- Message from [facility] sent at 11/7/2022 10:40 AM EST -----  Subject: Message to Provider    QUESTIONS  Information for Provider? Keyana from [facility] called to   see if PCP would be signing death certificate of pt. Please return call. [phone]  ---------------------------------------------------------------------------  --------------  Sohail Back INFO  [phone]; OK to leave message on voicemail  ---------------------------------------------------------------------------  --------------  SCRIPT ANSWERS  Relationship to Patient? Third Party  Third Party Type? Other  Other Third Party Type? [facility]  Representative Name?  Vivien Gomes

## 2022-11-08 ENCOUNTER — TELEPHONE (OUTPATIENT)
Dept: FAMILY MEDICINE CLINIC | Age: 87
End: 2022-11-08

## 2022-11-08 NOTE — TELEPHONE ENCOUNTER
Patients son Александр Reich calling to inform provider that his father has passed away 11.06.2022 and wanted Brandon to call him so that he could thank her personally

## 2023-03-27 NOTE — PROCEDURES
Bladder scan was completed by ROSA Tejeda at 1540. The residual amount of 0 ml was resulted to [employer].
no dysuria

## 2024-06-14 NOTE — TELEPHONE ENCOUNTER
[facility]  Nurses Note -- 4 Eyes      6/13/2024       Skin assessed on: Q Shift      [x] No Pressure Injuries Present    []Prevention Measures Documented      3 lap sites noted to the abdomen    [] Yes LDA  for Pressure Injury Previously documented     [] Yes New Pressure Injury Discovered   [] LDA for New Pressure Injury Added      Attending RN:  Miranda James RN     Second RN:   Raul Davis RN         See my chart message

## 2025-05-07 NOTE — ED NOTES
97ml found on bladder scanner     Lisa Solares, RN  10/09/20 [address] Pregnancy And Lactation Text: This medication is considered safe during pregnancy and breastfeeding.

## (undated) DEVICE — CATHETER URETH 24FR BLLN 30CC STD LTX 3 W TWO OPP DRNGE EYE

## (undated) DEVICE — PROBE LITHO RENAL BLDR DISP

## (undated) DEVICE — GLOVE SURG SZ 65 THK91MIL LTX FREE SYN POLYISOPRENE

## (undated) DEVICE — Device: Brand: OLYMPUS

## (undated) DEVICE — NEEDLE ENDOSCP 1ML SYR CA HA SIDEKCK RIG INJ COAPTITE

## (undated) DEVICE — HF-RESECTION ELECTRODE PLASMALOOP LOOP, MEDIUM, 24 FR., 12°-30°, ESG TURIS: Brand: OLYMPUS

## (undated) DEVICE — Z INACTIVE USE 2660664 SOLUTION IRRIG 3000ML 0.9% SOD CHL USP UROMATIC PLAS CONT

## (undated) DEVICE — DRAINBAG,ANTI-REFLUX TOWER,L/F,2000ML,LL: Brand: MEDLINE

## (undated) DEVICE — TUBING, SUCTION, 1/4" X 20', STRAIGHT: Brand: MEDLINE INDUSTRIES, INC.

## (undated) DEVICE — GLOVE ORANGE PI 7 1/2   MSG9075

## (undated) DEVICE — SYRINGE CATH TIP 50ML

## (undated) DEVICE — Device

## (undated) DEVICE — YANKAUER,BULB TIP,W/O VENT,RIGID,STERILE: Brand: MEDLINE

## (undated) DEVICE — SOLUTION IV IRRIG POUR BRL 0.9% SODIUM CHL 2F7124

## (undated) DEVICE — SPONGE GZ W4XL4IN COT 12 PLY TYP VII WVN C FLD DSGN

## (undated) DEVICE — GUIDEWIRE ENDOSCP L150CM DIA0.035IN TIP 3CM PTFE NIT

## (undated) DEVICE — CATHETER,FOLEY,SILI-ELAST,LTX,16FR,10ML: Brand: MEDLINE

## (undated) DEVICE — SOLUTION IV 1000ML 0.9% SOD CHL PH 5 INJ USP VIAFLX PLAS

## (undated) DEVICE — 3M™ RANGER™ FLUID WARMING IRRIGATION SET, 24750, 10/CASE: Brand: 3M™ RANGER™

## (undated) DEVICE — 4-PORT MANIFOLD: Brand: NEPTUNE 2